# Patient Record
Sex: FEMALE | Race: OTHER | Employment: FULL TIME
[De-identification: names, ages, dates, MRNs, and addresses within clinical notes are randomized per-mention and may not be internally consistent; named-entity substitution may affect disease eponyms.]

---

## 2017-02-14 ENCOUNTER — TELEPHONE (OUTPATIENT)
Facility: CLINIC | Age: 27
End: 2017-02-14

## 2017-02-14 RX ORDER — BENZONATATE 100 MG/1
100 CAPSULE ORAL 3 TIMES DAILY PRN
Qty: 21 CAPSULE | Refills: 0 | Status: SHIPPED | OUTPATIENT
Start: 2017-02-14 | End: 2017-02-21

## 2017-02-14 RX ORDER — ONDANSETRON 4 MG/1
4 TABLET, ORALLY DISINTEGRATING ORAL EVERY 8 HOURS PRN
Qty: 30 TABLET | Refills: 0 | Status: SHIPPED | OUTPATIENT
Start: 2017-02-14 | End: 2018-06-20

## 2017-02-14 RX ORDER — LOPERAMIDE HYDROCHLORIDE 2 MG/1
2 CAPSULE ORAL 4 TIMES DAILY PRN
Qty: 40 CAPSULE | Refills: 0 | Status: SHIPPED | OUTPATIENT
Start: 2017-02-14 | End: 2017-02-24

## 2018-04-15 ENCOUNTER — HOSPITAL ENCOUNTER (EMERGENCY)
Age: 28
Discharge: HOME | End: 2018-04-15
Attending: EMERGENCY MEDICINE

## 2018-04-15 ENCOUNTER — APPOINTMENT (OUTPATIENT)
Dept: CT IMAGING | Age: 28
End: 2018-04-15

## 2018-04-15 VITALS
SYSTOLIC BLOOD PRESSURE: 124 MMHG | DIASTOLIC BLOOD PRESSURE: 43 MMHG | OXYGEN SATURATION: 98 % | WEIGHT: 154 LBS | HEART RATE: 124 BPM | RESPIRATION RATE: 16 BRPM | BODY MASS INDEX: 30.23 KG/M2 | TEMPERATURE: 97.9 F | HEIGHT: 60 IN

## 2018-04-15 DIAGNOSIS — R11.2 NON-INTRACTABLE VOMITING WITH NAUSEA, UNSPECIFIED VOMITING TYPE: Primary | ICD-10-CM

## 2018-04-15 DIAGNOSIS — R19.7 DIARRHEA, UNSPECIFIED TYPE: ICD-10-CM

## 2018-04-15 DIAGNOSIS — R10.10 PAIN OF UPPER ABDOMEN: ICD-10-CM

## 2018-04-15 LAB
-: ABNORMAL
ABSOLUTE BANDS #: 0.73 K/UL (ref 0–1)
ABSOLUTE EOS #: 0 K/UL (ref 0–0.4)
ABSOLUTE IMMATURE GRANULOCYTE: ABNORMAL K/UL (ref 0–0.3)
ABSOLUTE LYMPH #: 0.49 K/UL (ref 1–4.8)
ABSOLUTE MONO #: 1.7 K/UL (ref 0.1–1.3)
ALBUMIN SERPL-MCNC: 4.8 G/DL (ref 3.5–5.2)
ALBUMIN/GLOBULIN RATIO: ABNORMAL (ref 1–2.5)
ALP BLD-CCNC: 78 U/L (ref 35–104)
ALT SERPL-CCNC: 11 U/L (ref 5–33)
AMORPHOUS: ABNORMAL
ANION GAP SERPL CALCULATED.3IONS-SCNC: 16 MMOL/L (ref 9–17)
AST SERPL-CCNC: 16 U/L
BACTERIA: ABNORMAL
BANDS: 3 % (ref 0–10)
BASOPHILS # BLD: 0 % (ref 0–2)
BASOPHILS ABSOLUTE: 0 K/UL (ref 0–0.2)
BILIRUB SERPL-MCNC: 1.2 MG/DL (ref 0.3–1.2)
BILIRUBIN URINE: NEGATIVE
BUN BLDV-MCNC: 12 MG/DL (ref 6–20)
BUN/CREAT BLD: ABNORMAL (ref 9–20)
CALCIUM SERPL-MCNC: 9.1 MG/DL (ref 8.6–10.4)
CASTS UA: ABNORMAL /LPF
CHLORIDE BLD-SCNC: 104 MMOL/L (ref 98–107)
CO2: 22 MMOL/L (ref 20–31)
COLOR: YELLOW
COMMENT UA: ABNORMAL
CREAT SERPL-MCNC: 0.62 MG/DL (ref 0.5–0.9)
CRYSTALS, UA: ABNORMAL /HPF
DIFFERENTIAL TYPE: ABNORMAL
EOSINOPHILS RELATIVE PERCENT: 0 % (ref 0–4)
EPITHELIAL CELLS UA: ABNORMAL /HPF
GFR AFRICAN AMERICAN: >60 ML/MIN
GFR NON-AFRICAN AMERICAN: >60 ML/MIN
GFR SERPL CREATININE-BSD FRML MDRD: ABNORMAL ML/MIN/{1.73_M2}
GFR SERPL CREATININE-BSD FRML MDRD: ABNORMAL ML/MIN/{1.73_M2}
GLUCOSE BLD-MCNC: 110 MG/DL (ref 70–99)
GLUCOSE URINE: NEGATIVE
HCG(URINE) PREGNANCY TEST: NEGATIVE
HCT VFR BLD CALC: 43.2 % (ref 36–46)
HEMOGLOBIN: 14.5 G/DL (ref 12–16)
IMMATURE GRANULOCYTES: ABNORMAL %
KETONES, URINE: ABNORMAL
LEUKOCYTE ESTERASE, URINE: NEGATIVE
LIPASE: 16 U/L (ref 13–60)
LYMPHOCYTES # BLD: 2 % (ref 24–44)
MAGNESIUM: 1.6 MG/DL (ref 1.6–2.6)
MCH RBC QN AUTO: 30.1 PG (ref 26–34)
MCHC RBC AUTO-ENTMCNC: 33.6 G/DL (ref 31–37)
MCV RBC AUTO: 89.6 FL (ref 80–100)
MONOCYTES # BLD: 7 % (ref 1–7)
MORPHOLOGY: ABNORMAL
MUCUS: ABNORMAL
NITRITE, URINE: NEGATIVE
NRBC AUTOMATED: ABNORMAL PER 100 WBC
OTHER OBSERVATIONS UA: ABNORMAL
PDW BLD-RTO: 13.5 % (ref 11.5–14.9)
PH UA: 5.5 (ref 5–8)
PLATELET # BLD: 371 K/UL (ref 150–450)
PLATELET ESTIMATE: ABNORMAL
PMV BLD AUTO: 7.7 FL (ref 6–12)
POTASSIUM SERPL-SCNC: 4.3 MMOL/L (ref 3.7–5.3)
PROTEIN UA: NEGATIVE
RBC # BLD: 4.82 M/UL (ref 4–5.2)
RBC # BLD: ABNORMAL 10*6/UL
RBC UA: ABNORMAL /HPF
RENAL EPITHELIAL, UA: ABNORMAL /HPF
SEG NEUTROPHILS: 88 % (ref 36–66)
SEGMENTED NEUTROPHILS ABSOLUTE COUNT: 21.38 K/UL (ref 1.3–9.1)
SODIUM BLD-SCNC: 142 MMOL/L (ref 135–144)
SPECIFIC GRAVITY UA: 1.03 (ref 1–1.03)
TOTAL PROTEIN: 7.9 G/DL (ref 6.4–8.3)
TRICHOMONAS: ABNORMAL
TURBIDITY: CLEAR
URINE HGB: ABNORMAL
UROBILINOGEN, URINE: NORMAL
WBC # BLD: 24.3 K/UL (ref 3.5–11)
WBC # BLD: ABNORMAL 10*3/UL
WBC UA: ABNORMAL /HPF
YEAST: ABNORMAL

## 2018-04-15 PROCEDURE — 36415 COLL VENOUS BLD VENIPUNCTURE: CPT

## 2018-04-15 PROCEDURE — 2580000003 HC RX 258: Performed by: EMERGENCY MEDICINE

## 2018-04-15 PROCEDURE — 81001 URINALYSIS AUTO W/SCOPE: CPT

## 2018-04-15 PROCEDURE — 74177 CT ABD & PELVIS W/CONTRAST: CPT

## 2018-04-15 PROCEDURE — 80053 COMPREHEN METABOLIC PANEL: CPT

## 2018-04-15 PROCEDURE — 6360000002 HC RX W HCPCS: Performed by: EMERGENCY MEDICINE

## 2018-04-15 PROCEDURE — 87086 URINE CULTURE/COLONY COUNT: CPT

## 2018-04-15 PROCEDURE — 6360000004 HC RX CONTRAST MEDICATION: Performed by: EMERGENCY MEDICINE

## 2018-04-15 PROCEDURE — 99284 EMERGENCY DEPT VISIT MOD MDM: CPT

## 2018-04-15 PROCEDURE — 2500000003 HC RX 250 WO HCPCS: Performed by: EMERGENCY MEDICINE

## 2018-04-15 PROCEDURE — 83735 ASSAY OF MAGNESIUM: CPT

## 2018-04-15 PROCEDURE — S0028 INJECTION, FAMOTIDINE, 20 MG: HCPCS | Performed by: EMERGENCY MEDICINE

## 2018-04-15 PROCEDURE — 84703 CHORIONIC GONADOTROPIN ASSAY: CPT

## 2018-04-15 PROCEDURE — 96375 TX/PRO/DX INJ NEW DRUG ADDON: CPT

## 2018-04-15 PROCEDURE — 83690 ASSAY OF LIPASE: CPT

## 2018-04-15 PROCEDURE — 96374 THER/PROPH/DIAG INJ IV PUSH: CPT

## 2018-04-15 PROCEDURE — 85025 COMPLETE CBC W/AUTO DIFF WBC: CPT

## 2018-04-15 RX ORDER — 0.9 % SODIUM CHLORIDE 0.9 %
100 INTRAVENOUS SOLUTION INTRAVENOUS ONCE
Status: COMPLETED | OUTPATIENT
Start: 2018-04-15 | End: 2018-04-15

## 2018-04-15 RX ORDER — DICYCLOMINE HYDROCHLORIDE 10 MG/1
10 CAPSULE ORAL
Qty: 20 CAPSULE | Refills: 0 | Status: SHIPPED | OUTPATIENT
Start: 2018-04-15 | End: 2018-06-20

## 2018-04-15 RX ORDER — DICYCLOMINE HYDROCHLORIDE 10 MG/ML
20 INJECTION INTRAMUSCULAR ONCE
Status: COMPLETED | OUTPATIENT
Start: 2018-04-15 | End: 2018-04-15

## 2018-04-15 RX ORDER — FAMOTIDINE 20 MG/1
20 TABLET, FILM COATED ORAL 2 TIMES DAILY
Qty: 20 TABLET | Refills: 0 | Status: SHIPPED | OUTPATIENT
Start: 2018-04-15 | End: 2018-06-20

## 2018-04-15 RX ORDER — SODIUM CHLORIDE 0.9 % (FLUSH) 0.9 %
10 SYRINGE (ML) INJECTION PRN
Status: DISCONTINUED | OUTPATIENT
Start: 2018-04-15 | End: 2018-04-15 | Stop reason: HOSPADM

## 2018-04-15 RX ORDER — ONDANSETRON 2 MG/ML
8 INJECTION INTRAMUSCULAR; INTRAVENOUS ONCE
Status: COMPLETED | OUTPATIENT
Start: 2018-04-15 | End: 2018-04-15

## 2018-04-15 RX ORDER — 0.9 % SODIUM CHLORIDE 0.9 %
1000 INTRAVENOUS SOLUTION INTRAVENOUS ONCE
Status: COMPLETED | OUTPATIENT
Start: 2018-04-15 | End: 2018-04-15

## 2018-04-15 RX ORDER — ONDANSETRON 4 MG/1
4 TABLET, FILM COATED ORAL EVERY 8 HOURS PRN
Qty: 20 TABLET | Refills: 0 | Status: SHIPPED | OUTPATIENT
Start: 2018-04-15 | End: 2018-06-20

## 2018-04-15 RX ADMIN — SODIUM CHLORIDE 1000 ML: 9 INJECTION, SOLUTION INTRAVENOUS at 15:40

## 2018-04-15 RX ADMIN — ONDANSETRON 8 MG: 2 INJECTION INTRAMUSCULAR; INTRAVENOUS at 15:43

## 2018-04-15 RX ADMIN — IOPAMIDOL 75 ML: 755 INJECTION, SOLUTION INTRAVENOUS at 16:19

## 2018-04-15 RX ADMIN — DICYCLOMINE HYDROCHLORIDE 20 MG: 20 INJECTION, SOLUTION INTRAMUSCULAR at 15:34

## 2018-04-15 RX ADMIN — SODIUM CHLORIDE 100 ML: 9 INJECTION, SOLUTION INTRAVENOUS at 16:19

## 2018-04-15 RX ADMIN — FAMOTIDINE 20 MG: 10 INJECTION INTRAVENOUS at 15:46

## 2018-04-15 RX ADMIN — PROCHLORPERAZINE EDISYLATE 10 MG: 5 INJECTION INTRAMUSCULAR; INTRAVENOUS at 16:53

## 2018-04-15 RX ADMIN — Medication 10 ML: at 16:20

## 2018-04-15 ASSESSMENT — ENCOUNTER SYMPTOMS
ABDOMINAL PAIN: 1
NAUSEA: 1
VOMITING: 1
DIARRHEA: 1

## 2018-04-15 ASSESSMENT — PAIN SCALES - GENERAL: PAINLEVEL_OUTOF10: 10

## 2018-04-16 LAB
CULTURE: NORMAL
CULTURE: NORMAL
Lab: NORMAL
SPECIMEN DESCRIPTION: NORMAL
SPECIMEN DESCRIPTION: NORMAL
STATUS: NORMAL

## 2018-06-20 ENCOUNTER — OFFICE VISIT (OUTPATIENT)
Dept: OBGYN CLINIC | Age: 28
End: 2018-06-20
Payer: COMMERCIAL

## 2018-06-20 ENCOUNTER — HOSPITAL ENCOUNTER (OUTPATIENT)
Age: 28
Setting detail: SPECIMEN
Discharge: HOME | End: 2018-06-20
Payer: COMMERCIAL

## 2018-06-20 VITALS
WEIGHT: 129 LBS | BODY MASS INDEX: 22.86 KG/M2 | SYSTOLIC BLOOD PRESSURE: 110 MMHG | DIASTOLIC BLOOD PRESSURE: 72 MMHG | HEART RATE: 80 BPM | HEIGHT: 63 IN

## 2018-06-20 DIAGNOSIS — Z11.3 SCREENING EXAMINATION FOR STD (SEXUALLY TRANSMITTED DISEASE): ICD-10-CM

## 2018-06-20 DIAGNOSIS — Z97.5 NEXPLANON IN PLACE: ICD-10-CM

## 2018-06-20 DIAGNOSIS — Z01.419 WELL WOMAN EXAM: ICD-10-CM

## 2018-06-20 DIAGNOSIS — Z01.419 WELL WOMAN EXAM: Primary | ICD-10-CM

## 2018-06-20 LAB
DIRECT EXAM: ABNORMAL
Lab: ABNORMAL
SPECIMEN DESCRIPTION: ABNORMAL
STATUS: ABNORMAL

## 2018-06-20 PROCEDURE — 87480 CANDIDA DNA DIR PROBE: CPT

## 2018-06-20 PROCEDURE — 87510 GARDNER VAG DNA DIR PROBE: CPT

## 2018-06-20 PROCEDURE — 87591 N.GONORRHOEAE DNA AMP PROB: CPT

## 2018-06-20 PROCEDURE — 87660 TRICHOMONAS VAGIN DIR PROBE: CPT

## 2018-06-20 PROCEDURE — 87491 CHLMYD TRACH DNA AMP PROBE: CPT

## 2018-06-20 PROCEDURE — 99214 OFFICE O/P EST MOD 30 MIN: CPT | Performed by: NURSE PRACTITIONER

## 2018-06-20 PROCEDURE — G0145 SCR C/V CYTO,THINLAYER,RESCR: HCPCS

## 2018-06-20 ASSESSMENT — PATIENT HEALTH QUESTIONNAIRE - PHQ9
SUM OF ALL RESPONSES TO PHQ9 QUESTIONS 1 & 2: 0
SUM OF ALL RESPONSES TO PHQ QUESTIONS 1-9: 0
1. LITTLE INTEREST OR PLEASURE IN DOING THINGS: 0
2. FEELING DOWN, DEPRESSED OR HOPELESS: 0

## 2018-06-21 ENCOUNTER — TELEPHONE (OUTPATIENT)
Dept: OBGYN CLINIC | Age: 28
End: 2018-06-21

## 2018-06-21 LAB
C TRACH DNA GENITAL QL NAA+PROBE: NEGATIVE
N. GONORRHOEAE DNA: NEGATIVE

## 2018-06-21 RX ORDER — METRONIDAZOLE 500 MG/1
500 TABLET ORAL 2 TIMES DAILY
Qty: 14 TABLET | Refills: 0 | Status: SHIPPED | OUTPATIENT
Start: 2018-06-21 | End: 2018-06-28

## 2018-07-09 LAB — CYTOLOGY REPORT: NORMAL

## 2018-07-26 ENCOUNTER — OFFICE VISIT (OUTPATIENT)
Dept: OBGYN CLINIC | Age: 28
End: 2018-07-26
Payer: COMMERCIAL

## 2018-07-26 ENCOUNTER — HOSPITAL ENCOUNTER (OUTPATIENT)
Age: 28
Setting detail: SPECIMEN
Discharge: HOME | End: 2018-07-26
Payer: COMMERCIAL

## 2018-07-26 VITALS
DIASTOLIC BLOOD PRESSURE: 76 MMHG | HEIGHT: 62 IN | HEART RATE: 76 BPM | WEIGHT: 132 LBS | BODY MASS INDEX: 24.29 KG/M2 | SYSTOLIC BLOOD PRESSURE: 104 MMHG

## 2018-07-26 DIAGNOSIS — Z97.5 NEXPLANON IN PLACE: Primary | ICD-10-CM

## 2018-07-26 DIAGNOSIS — Z30.09 FAMILY PLANNING: ICD-10-CM

## 2018-07-26 PROCEDURE — 11976 REMOVE CONTRACEPTIVE CAPSULE: CPT | Performed by: OBSTETRICS & GYNECOLOGY

## 2018-07-26 PROCEDURE — 88300 SURGICAL PATH GROSS: CPT

## 2018-07-26 RX ORDER — PNV NO.95/FERROUS FUM/FOLIC AC 28MG-0.8MG
1 TABLET ORAL DAILY
Qty: 30 TABLET | Refills: 12 | Status: SHIPPED | OUTPATIENT
Start: 2018-07-26 | End: 2018-10-10

## 2018-07-30 LAB — SURGICAL PATHOLOGY REPORT: NORMAL

## 2018-10-10 ENCOUNTER — HOSPITAL ENCOUNTER (OUTPATIENT)
Age: 28
Discharge: HOME | End: 2018-10-10
Payer: COMMERCIAL

## 2018-10-10 ENCOUNTER — OFFICE VISIT (OUTPATIENT)
Dept: OBGYN CLINIC | Age: 28
End: 2018-10-10
Payer: COMMERCIAL

## 2018-10-10 VITALS
RESPIRATION RATE: 18 BRPM | SYSTOLIC BLOOD PRESSURE: 114 MMHG | DIASTOLIC BLOOD PRESSURE: 70 MMHG | BODY MASS INDEX: 25.91 KG/M2 | HEIGHT: 60 IN | HEART RATE: 74 BPM | WEIGHT: 132 LBS

## 2018-10-10 DIAGNOSIS — N63.21 BREAST LUMP ON LEFT SIDE AT 2 O'CLOCK POSITION: ICD-10-CM

## 2018-10-10 DIAGNOSIS — N64.4 BREAST PAIN: ICD-10-CM

## 2018-10-10 DIAGNOSIS — N63.11 BREAST LUMP ON RIGHT SIDE AT 11 O'CLOCK POSITION: Primary | ICD-10-CM

## 2018-10-10 DIAGNOSIS — N63.25 BREAST LUMP ON LEFT SIDE AT 12 O'CLOCK POSITION: ICD-10-CM

## 2018-10-10 LAB — HCG QUANTITATIVE: <1 IU/L

## 2018-10-10 PROCEDURE — 36415 COLL VENOUS BLD VENIPUNCTURE: CPT

## 2018-10-10 PROCEDURE — G8419 CALC BMI OUT NRM PARAM NOF/U: HCPCS | Performed by: NURSE PRACTITIONER

## 2018-10-10 PROCEDURE — G8427 DOCREV CUR MEDS BY ELIG CLIN: HCPCS | Performed by: NURSE PRACTITIONER

## 2018-10-10 PROCEDURE — G8484 FLU IMMUNIZE NO ADMIN: HCPCS | Performed by: NURSE PRACTITIONER

## 2018-10-10 PROCEDURE — 84702 CHORIONIC GONADOTROPIN TEST: CPT

## 2018-10-10 PROCEDURE — 1036F TOBACCO NON-USER: CPT | Performed by: NURSE PRACTITIONER

## 2018-10-10 PROCEDURE — 99213 OFFICE O/P EST LOW 20 MIN: CPT | Performed by: NURSE PRACTITIONER

## 2018-10-10 NOTE — PROGRESS NOTES
Aurelio Lou  10/10/2018    YOB: 1990          The patient was seen today. She is here regarding bilateral breast pain and breast lumps. Denies family hx of breast cancer. Patient reports pain, tenderness and lumps have been happening for past 2 weeks. Currently trying to get pregnant. LMP 2018. Had nexplanon removed 2018. Did not have menses while nexplanon in place. Negative home urine pregnancy test.  Her bowels are regular and she is voiding without difficulty. HPI:  Aurelio Lou is a 24 y.o. female      Breast pain and breast lumps      Obstetric History       T1      L1     SAB0   TAB0   Ectopic0   Molar0   Multiple0   Live Births0       # Outcome Date GA Lbr Kevin/2nd Weight Sex Delivery Anes PTL Lv   1 Term  38w1d  6 lb 14.8 oz (3.14 kg)  Vag-Spont         Apgar1:  8                Apgar5: 9          Past Medical History:   Diagnosis Date    Depression        History reviewed. No pertinent surgical history. Family History   Problem Relation Age of Onset    Hypertension Other         G.Parents    Depression Mother     Hypertension Mother     Alcohol Abuse Mother     Diabetes Other         G.Parents       Social History     Social History    Marital status: Single     Spouse name: N/A    Number of children: N/A    Years of education: N/A     Occupational History    Not on file. Social History Main Topics    Smoking status: Never Smoker    Smokeless tobacco: Never Used    Alcohol use 0.0 oz/week      Comment: occ    Drug use: Yes     Types: Marijuana      Comment: occ    Sexual activity: Yes     Partners: Male     Other Topics Concern    Not on file     Social History Narrative    No narrative on file         MEDICATIONS:  No current outpatient prescriptions on file. No current facility-administered medications for this visit.               ALLERGIES:  Allergies as of 10/10/2018    (No Known Allergies)         REVIEW OF

## 2018-12-11 DIAGNOSIS — N92.6 MISSED MENSES: Primary | ICD-10-CM

## 2018-12-12 ENCOUNTER — HOSPITAL ENCOUNTER (OUTPATIENT)
Age: 28
Discharge: HOME | End: 2018-12-12
Payer: COMMERCIAL

## 2018-12-12 DIAGNOSIS — N92.6 MISSED MENSES: ICD-10-CM

## 2018-12-12 LAB — HCG QUANTITATIVE: 5082 IU/L

## 2018-12-12 PROCEDURE — 36415 COLL VENOUS BLD VENIPUNCTURE: CPT

## 2018-12-12 PROCEDURE — 84702 CHORIONIC GONADOTROPIN TEST: CPT

## 2018-12-13 ENCOUNTER — TELEPHONE (OUTPATIENT)
Dept: OBGYN CLINIC | Age: 28
End: 2018-12-13

## 2018-12-13 RX ORDER — PNV NO.95/FERROUS FUM/FOLIC AC 28MG-0.8MG
1 TABLET ORAL DAILY
Qty: 30 TABLET | Refills: 11 | Status: SHIPPED | OUTPATIENT
Start: 2018-12-13 | End: 2019-11-18

## 2018-12-20 ENCOUNTER — TELEPHONE (OUTPATIENT)
Dept: OBGYN CLINIC | Age: 28
End: 2018-12-20

## 2018-12-20 RX ORDER — PYRIDOXINE HCL (VITAMIN B6) 50 MG
50 TABLET ORAL 2 TIMES DAILY
Qty: 60 TABLET | Refills: 0 | Status: SHIPPED | OUTPATIENT
Start: 2018-12-20 | End: 2019-11-18

## 2018-12-20 NOTE — TELEPHONE ENCOUNTER
Patient called ( is about 6 weeks pregnant/ has initial OB intake 1/15/19) She states she is very nauseous and having difficulty being able to keep food and drink down without vomiting. *She is requesting rx for something to be sent to [address] [place] [address] on  [employer].

## 2018-12-20 NOTE — TELEPHONE ENCOUNTER
Per Benigno Elias rx sent to pharmacy.   Left message for patient to call if any further questions or concerns

## 2018-12-27 ENCOUNTER — TELEPHONE (OUTPATIENT)
Dept: OBGYN CLINIC | Age: 28
End: 2018-12-27

## 2018-12-27 NOTE — TELEPHONE ENCOUNTER
Pt called stating that she is about 6 weeks pregnant and she has been vomiting for 2 days and has not been able to keep any liquid or food down. Pt stated that she has been vomiting blood in a decent amount. Per Anastasia Members, pt to go to the ER to get hydrated and evaluated.

## 2019-01-15 ENCOUNTER — HOSPITAL ENCOUNTER (OUTPATIENT)
Age: 29
Setting detail: SPECIMEN
Discharge: HOME | End: 2019-01-15
Payer: COMMERCIAL

## 2019-01-15 ENCOUNTER — INITIAL PRENATAL (OUTPATIENT)
Dept: OBGYN CLINIC | Age: 29
End: 2019-01-15
Payer: COMMERCIAL

## 2019-01-15 VITALS
WEIGHT: 128 LBS | BODY MASS INDEX: 25 KG/M2 | DIASTOLIC BLOOD PRESSURE: 70 MMHG | HEART RATE: 76 BPM | SYSTOLIC BLOOD PRESSURE: 116 MMHG

## 2019-01-15 DIAGNOSIS — Z3A.09 9 WEEKS GESTATION OF PREGNANCY: ICD-10-CM

## 2019-01-15 DIAGNOSIS — Z34.90 SECOND PREGNANCY: Primary | ICD-10-CM

## 2019-01-15 DIAGNOSIS — Z34.90 SECOND PREGNANCY: ICD-10-CM

## 2019-01-15 PROCEDURE — 99213 OFFICE O/P EST LOW 20 MIN: CPT | Performed by: ADVANCED PRACTICE MIDWIFE

## 2019-01-15 PROCEDURE — 87070 CULTURE OTHR SPECIMN AEROBIC: CPT

## 2019-01-15 PROCEDURE — H1000 PRENATAL CARE ATRISK ASSESSM: HCPCS | Performed by: ADVANCED PRACTICE MIDWIFE

## 2019-01-15 PROCEDURE — 87491 CHLMYD TRACH DNA AMP PROBE: CPT

## 2019-01-15 PROCEDURE — 87591 N.GONORRHOEAE DNA AMP PROB: CPT

## 2019-01-16 ENCOUNTER — HOSPITAL ENCOUNTER (OUTPATIENT)
Age: 29
Discharge: HOME | End: 2019-01-16
Payer: COMMERCIAL

## 2019-01-16 DIAGNOSIS — Z3A.09 9 WEEKS GESTATION OF PREGNANCY: ICD-10-CM

## 2019-01-16 DIAGNOSIS — Z34.90 SECOND PREGNANCY: ICD-10-CM

## 2019-01-16 PROBLEM — E03.8 TSH DEFICIENCY: Status: ACTIVE | Noted: 2019-01-16

## 2019-01-16 LAB
-: ABNORMAL
ABO/RH: NORMAL
ABSOLUTE EOS #: 0 K/UL (ref 0–0.4)
ABSOLUTE IMMATURE GRANULOCYTE: ABNORMAL K/UL (ref 0–0.3)
ABSOLUTE LYMPH #: 1.8 K/UL (ref 1–4.8)
ABSOLUTE MONO #: 0.8 K/UL (ref 0.1–1.3)
AMORPHOUS: ABNORMAL
ANTIBODY SCREEN: NEGATIVE
BACTERIA: ABNORMAL
BASOPHILS # BLD: 0 % (ref 0–2)
BASOPHILS ABSOLUTE: 0 K/UL (ref 0–0.2)
BILIRUBIN URINE: NEGATIVE
C TRACH DNA GENITAL QL NAA+PROBE: NEGATIVE
CASTS UA: ABNORMAL /LPF
COLOR: YELLOW
COMMENT UA: ABNORMAL
CRYSTALS, UA: ABNORMAL /HPF
DIFFERENTIAL TYPE: ABNORMAL
EOSINOPHILS RELATIVE PERCENT: 0 % (ref 0–4)
EPITHELIAL CELLS UA: ABNORMAL /HPF
GLUCOSE BLD-MCNC: 75 MG/DL (ref 70–99)
GLUCOSE URINE: ABNORMAL
HCG QUANTITATIVE: ABNORMAL IU/L
HCT VFR BLD CALC: 39.8 % (ref 36–46)
HEMOGLOBIN: 13.5 G/DL (ref 12–16)
HEPATITIS B SURFACE ANTIGEN: NONREACTIVE
HIV AG/AB: NONREACTIVE
IMMATURE GRANULOCYTES: ABNORMAL %
KETONES, URINE: ABNORMAL
LEUKOCYTE ESTERASE, URINE: NEGATIVE
LYMPHOCYTES # BLD: 12 % (ref 24–44)
MCH RBC QN AUTO: 30.8 PG (ref 26–34)
MCHC RBC AUTO-ENTMCNC: 33.9 G/DL (ref 31–37)
MCV RBC AUTO: 90.9 FL (ref 80–100)
MONOCYTES # BLD: 6 % (ref 1–7)
MUCUS: ABNORMAL
N. GONORRHOEAE DNA: NEGATIVE
NITRITE, URINE: NEGATIVE
NRBC AUTOMATED: ABNORMAL PER 100 WBC
OTHER OBSERVATIONS UA: ABNORMAL
PDW BLD-RTO: 13.5 % (ref 11.5–14.9)
PH UA: 5 (ref 5–8)
PLATELET # BLD: 418 K/UL (ref 150–450)
PLATELET ESTIMATE: ABNORMAL
PMV BLD AUTO: 7.4 FL (ref 6–12)
PROTEIN UA: ABNORMAL
RBC # BLD: 4.37 M/UL (ref 4–5.2)
RBC # BLD: ABNORMAL 10*6/UL
RBC UA: ABNORMAL /HPF
RENAL EPITHELIAL, UA: ABNORMAL /HPF
RUBV IGG SER QL: 60.2 IU/ML
SEG NEUTROPHILS: 82 % (ref 36–66)
SEGMENTED NEUTROPHILS ABSOLUTE COUNT: 11.7 K/UL (ref 1.3–9.1)
SPECIFIC GRAVITY UA: 1.03 (ref 1–1.03)
T. PALLIDUM, IGG: NONREACTIVE
THYROXINE, FREE: 1.29 NG/DL (ref 0.93–1.7)
TRICHOMONAS: ABNORMAL
TSH SERPL DL<=0.05 MIU/L-ACNC: 0.21 MIU/L (ref 0.3–5)
TURBIDITY: ABNORMAL
URINE HGB: ABNORMAL
UROBILINOGEN, URINE: NORMAL
WBC # BLD: 14.3 K/UL (ref 3.5–11)
WBC # BLD: ABNORMAL 10*3/UL
WBC UA: ABNORMAL /HPF
YEAST: ABNORMAL

## 2019-01-16 PROCEDURE — 87389 HIV-1 AG W/HIV-1&-2 AB AG IA: CPT

## 2019-01-16 PROCEDURE — 86850 RBC ANTIBODY SCREEN: CPT

## 2019-01-16 PROCEDURE — 36415 COLL VENOUS BLD VENIPUNCTURE: CPT

## 2019-01-16 PROCEDURE — 81001 URINALYSIS AUTO W/SCOPE: CPT

## 2019-01-16 PROCEDURE — 86901 BLOOD TYPING SEROLOGIC RH(D): CPT

## 2019-01-16 PROCEDURE — 85025 COMPLETE CBC W/AUTO DIFF WBC: CPT

## 2019-01-16 PROCEDURE — 84702 CHORIONIC GONADOTROPIN TEST: CPT

## 2019-01-16 PROCEDURE — 86762 RUBELLA ANTIBODY: CPT

## 2019-01-16 PROCEDURE — 87340 HEPATITIS B SURFACE AG IA: CPT

## 2019-01-16 PROCEDURE — 86780 TREPONEMA PALLIDUM: CPT

## 2019-01-16 PROCEDURE — 84443 ASSAY THYROID STIM HORMONE: CPT

## 2019-01-16 PROCEDURE — 84439 ASSAY OF FREE THYROXINE: CPT

## 2019-01-16 PROCEDURE — 82947 ASSAY GLUCOSE BLOOD QUANT: CPT

## 2019-01-16 PROCEDURE — 86900 BLOOD TYPING SEROLOGIC ABO: CPT

## 2019-01-18 LAB
CULTURE: ABNORMAL
Lab: ABNORMAL
SPECIMEN DESCRIPTION: ABNORMAL
STATUS: ABNORMAL

## 2019-02-05 ENCOUNTER — TELEPHONE (OUTPATIENT)
Dept: OBGYN CLINIC | Age: 29
End: 2019-02-05

## 2019-02-05 RX ORDER — FLUCONAZOLE 150 MG/1
150 TABLET ORAL ONCE
Qty: 1 TABLET | Refills: 1 | Status: CANCELLED | OUTPATIENT
Start: 2019-02-05 | End: 2019-02-05

## 2019-02-13 ENCOUNTER — HOSPITAL ENCOUNTER (OUTPATIENT)
Age: 29
Setting detail: SPECIMEN
Discharge: HOME | End: 2019-02-13
Payer: COMMERCIAL

## 2019-02-13 ENCOUNTER — HOSPITAL ENCOUNTER (OUTPATIENT)
Age: 29
Setting detail: SPECIMEN
End: 2019-02-13
Payer: COMMERCIAL

## 2019-02-13 ENCOUNTER — ROUTINE PRENATAL (OUTPATIENT)
Dept: OBGYN CLINIC | Age: 29
End: 2019-02-13
Payer: COMMERCIAL

## 2019-02-13 VITALS
SYSTOLIC BLOOD PRESSURE: 94 MMHG | DIASTOLIC BLOOD PRESSURE: 62 MMHG | WEIGHT: 128 LBS | HEART RATE: 78 BPM | BODY MASS INDEX: 25 KG/M2

## 2019-02-13 DIAGNOSIS — R82.71 BACTERIA IN URINE: ICD-10-CM

## 2019-02-13 DIAGNOSIS — O09.91 SUPERVISION OF HIGH RISK PREGNANCY IN FIRST TRIMESTER: Primary | ICD-10-CM

## 2019-02-13 DIAGNOSIS — Z34.90 SECOND PREGNANCY: ICD-10-CM

## 2019-02-13 DIAGNOSIS — E03.8 TSH DEFICIENCY: ICD-10-CM

## 2019-02-13 DIAGNOSIS — R82.71 BACTERIA IN URINE: Primary | ICD-10-CM

## 2019-02-13 LAB
-: ABNORMAL
AMORPHOUS: ABNORMAL
BACTERIA: ABNORMAL
BILIRUBIN URINE: NEGATIVE
CASTS UA: ABNORMAL /LPF
COLOR: YELLOW
COMMENT UA: ABNORMAL
CRYSTALS, UA: ABNORMAL /HPF
CRYSTALS, UA: ABNORMAL /HPF
EPITHELIAL CELLS UA: ABNORMAL /HPF
GLUCOSE URINE: NEGATIVE
KETONES, URINE: NEGATIVE
LEUKOCYTE ESTERASE, URINE: NEGATIVE
MUCUS: ABNORMAL
NITRITE, URINE: NEGATIVE
OTHER OBSERVATIONS UA: ABNORMAL
PH UA: 5.5 (ref 5–8)
PROTEIN UA: NEGATIVE
RBC UA: ABNORMAL /HPF
RENAL EPITHELIAL, UA: ABNORMAL /HPF
SPECIFIC GRAVITY UA: 1.03 (ref 1–1.03)
TRICHOMONAS: ABNORMAL
TURBIDITY: ABNORMAL
URINE HGB: NEGATIVE
UROBILINOGEN, URINE: NORMAL
WBC UA: ABNORMAL /HPF
YEAST: ABNORMAL

## 2019-02-13 PROCEDURE — 87086 URINE CULTURE/COLONY COUNT: CPT

## 2019-02-13 PROCEDURE — 1036F TOBACCO NON-USER: CPT | Performed by: NURSE PRACTITIONER

## 2019-02-13 PROCEDURE — G8427 DOCREV CUR MEDS BY ELIG CLIN: HCPCS | Performed by: NURSE PRACTITIONER

## 2019-02-13 PROCEDURE — G8484 FLU IMMUNIZE NO ADMIN: HCPCS | Performed by: NURSE PRACTITIONER

## 2019-02-13 PROCEDURE — 99213 OFFICE O/P EST LOW 20 MIN: CPT | Performed by: NURSE PRACTITIONER

## 2019-02-13 PROCEDURE — 81001 URINALYSIS AUTO W/SCOPE: CPT

## 2019-02-13 PROCEDURE — G8419 CALC BMI OUT NRM PARAM NOF/U: HCPCS | Performed by: NURSE PRACTITIONER

## 2019-02-13 RX ORDER — LANOLIN ALCOHOL/MO/W.PET/CERES
50 CREAM (GRAM) TOPICAL 2 TIMES DAILY
Qty: 60 TABLET | Refills: 3 | Status: SHIPPED | OUTPATIENT
Start: 2019-02-13 | End: 2019-03-12

## 2019-02-14 DIAGNOSIS — O09.92 SUPERVISION OF HIGH-RISK PREGNANCY, SECOND TRIMESTER: Primary | ICD-10-CM

## 2019-02-14 LAB
CULTURE: NO GROWTH
Lab: NORMAL
SPECIMEN DESCRIPTION: NORMAL

## 2019-02-18 ENCOUNTER — OFFICE VISIT (OUTPATIENT)
Dept: OBGYN CLINIC | Age: 29
End: 2019-02-18

## 2019-02-18 DIAGNOSIS — O09.92 SUPERVISION OF HIGH-RISK PREGNANCY, SECOND TRIMESTER: Primary | ICD-10-CM

## 2019-02-18 DIAGNOSIS — O09.92 SUPERVISION OF HIGH-RISK PREGNANCY, SECOND TRIMESTER: ICD-10-CM

## 2019-02-18 LAB
ABDOMINAL CIRCUMFERENCE: NORMAL CM
BIPARIETAL DIAMETER: NORMAL CM
ESTIMATED FETAL WEIGHT: NORMAL GRAMS
FEMORAL DIAMETER: NORMAL CM
HC/AC: NORMAL
HEAD CIRCUMFERENCE: NORMAL CM

## 2019-02-18 PROCEDURE — 76816 OB US FOLLOW-UP PER FETUS: CPT | Performed by: OBSTETRICS & GYNECOLOGY

## 2019-02-21 ENCOUNTER — TELEPHONE (OUTPATIENT)
Dept: OBGYN CLINIC | Age: 29
End: 2019-02-21

## 2019-03-12 ENCOUNTER — ROUTINE PRENATAL (OUTPATIENT)
Dept: OBGYN CLINIC | Age: 29
End: 2019-03-12
Payer: COMMERCIAL

## 2019-03-12 VITALS
WEIGHT: 137 LBS | HEART RATE: 72 BPM | DIASTOLIC BLOOD PRESSURE: 68 MMHG | SYSTOLIC BLOOD PRESSURE: 114 MMHG | BODY MASS INDEX: 26.76 KG/M2

## 2019-03-12 DIAGNOSIS — E03.8 TSH DEFICIENCY: ICD-10-CM

## 2019-03-12 DIAGNOSIS — Z34.90 SECOND PREGNANCY: ICD-10-CM

## 2019-03-12 DIAGNOSIS — Z3A.18 18 WEEKS GESTATION OF PREGNANCY: Primary | ICD-10-CM

## 2019-03-12 PROCEDURE — G8427 DOCREV CUR MEDS BY ELIG CLIN: HCPCS | Performed by: ADVANCED PRACTICE MIDWIFE

## 2019-03-12 PROCEDURE — 99213 OFFICE O/P EST LOW 20 MIN: CPT | Performed by: ADVANCED PRACTICE MIDWIFE

## 2019-03-12 PROCEDURE — G8419 CALC BMI OUT NRM PARAM NOF/U: HCPCS | Performed by: ADVANCED PRACTICE MIDWIFE

## 2019-03-12 PROCEDURE — 1036F TOBACCO NON-USER: CPT | Performed by: ADVANCED PRACTICE MIDWIFE

## 2019-03-12 PROCEDURE — G8484 FLU IMMUNIZE NO ADMIN: HCPCS | Performed by: ADVANCED PRACTICE MIDWIFE

## 2019-04-04 ENCOUNTER — ROUTINE PRENATAL (OUTPATIENT)
Dept: PERINATAL CARE | Age: 29
End: 2019-04-04
Payer: COMMERCIAL

## 2019-04-04 VITALS
RESPIRATION RATE: 16 BRPM | WEIGHT: 147 LBS | SYSTOLIC BLOOD PRESSURE: 101 MMHG | BODY MASS INDEX: 28.86 KG/M2 | DIASTOLIC BLOOD PRESSURE: 61 MMHG | HEART RATE: 75 BPM | TEMPERATURE: 97.8 F | HEIGHT: 60 IN

## 2019-04-04 DIAGNOSIS — Z36.86 ENCOUNTER FOR SCREENING FOR RISK OF PRE-TERM LABOR: ICD-10-CM

## 2019-04-04 DIAGNOSIS — Z13.89 ENCOUNTER FOR ROUTINE SCREENING FOR MALFORMATION USING ULTRASONICS: Primary | ICD-10-CM

## 2019-04-04 DIAGNOSIS — Z3A.21 21 WEEKS GESTATION OF PREGNANCY: ICD-10-CM

## 2019-04-04 PROCEDURE — 76805 OB US >/= 14 WKS SNGL FETUS: CPT | Performed by: OBSTETRICS & GYNECOLOGY

## 2019-04-04 PROCEDURE — 76817 TRANSVAGINAL US OBSTETRIC: CPT | Performed by: OBSTETRICS & GYNECOLOGY

## 2019-04-08 ENCOUNTER — HOSPITAL ENCOUNTER (OUTPATIENT)
Age: 29
Setting detail: SPECIMEN
Discharge: HOME | End: 2019-04-08
Payer: COMMERCIAL

## 2019-04-08 DIAGNOSIS — Z34.90 SECOND PREGNANCY: ICD-10-CM

## 2019-04-08 DIAGNOSIS — Z3A.18 18 WEEKS GESTATION OF PREGNANCY: ICD-10-CM

## 2019-04-08 PROCEDURE — 36415 COLL VENOUS BLD VENIPUNCTURE: CPT

## 2019-04-08 PROCEDURE — 81511 FTL CGEN ABNOR FOUR ANAL: CPT

## 2019-04-11 ENCOUNTER — ROUTINE PRENATAL (OUTPATIENT)
Dept: OBGYN CLINIC | Age: 29
End: 2019-04-11
Payer: COMMERCIAL

## 2019-04-11 VITALS
DIASTOLIC BLOOD PRESSURE: 68 MMHG | SYSTOLIC BLOOD PRESSURE: 108 MMHG | BODY MASS INDEX: 28.9 KG/M2 | WEIGHT: 148 LBS | HEART RATE: 74 BPM

## 2019-04-11 DIAGNOSIS — E03.8 TSH DEFICIENCY: ICD-10-CM

## 2019-04-11 DIAGNOSIS — Z3A.22 22 WEEKS GESTATION OF PREGNANCY: Primary | ICD-10-CM

## 2019-04-11 DIAGNOSIS — Z34.90 SECOND PREGNANCY: ICD-10-CM

## 2019-04-11 LAB
AFP INTERPRETATION: NORMAL
AFP MOM: 0.86
AFP QUAD INTERPRETATION: NORMAL
AFP SPECIMEN: NORMAL
AFP: 73 NG/ML
DATE OF BIRTH: NORMAL
DATING METHOD: NORMAL
DETERMINED BY: NORMAL
DIABETIC: NO
DIMERIC INHIBIN A: 387 PG/ML
DUE DATE: NORMAL
ESTIMATED DUE DATE: NORMAL
FAMILY HISTORY NTD: NO
GESTATIONAL AGE: NORMAL
HISTORY OF ANEUPLOIDY?: NO
IN VITRO FERTILIZATION: NO
INHIBIN A MOM: 1.64
INSULIN REQ DIABETES: NO
LAST MENSTRUAL PERIOD: NORMAL
MATERNAL AGE AT EDD: 26.7 YR
MATERNAL WEIGHT: 147
MOM FOR HCG, 2ND TRIMESTER: 0.92
MONOCHORIONIC TWINS: NO
NUMBER OF FETUSES: NORMAL
PATIENT WEIGHT UNITS: NORMAL
PATIENT WEIGHT: NORMAL
PATIENT'S HCG, TRI 2: NORMAL IU/L
PREV TRISOMY PREG: NO
RACE (MATERNAL): NORMAL
RACE: NORMAL
REPEAT SPECIMEN?: NO
SMOKING: NO
SMOKING: NO
UE3 MOM: 0.73
UE3 VALUE: 2.22 NG/ML
VALPROIC/CARBAMAZEP: NO
ZZ NTE CLEAN UP: HISTORY: NO

## 2019-04-11 PROCEDURE — G8427 DOCREV CUR MEDS BY ELIG CLIN: HCPCS | Performed by: NURSE PRACTITIONER

## 2019-04-11 PROCEDURE — G8419 CALC BMI OUT NRM PARAM NOF/U: HCPCS | Performed by: NURSE PRACTITIONER

## 2019-04-11 PROCEDURE — 1036F TOBACCO NON-USER: CPT | Performed by: NURSE PRACTITIONER

## 2019-04-11 PROCEDURE — 99213 OFFICE O/P EST LOW 20 MIN: CPT | Performed by: NURSE PRACTITIONER

## 2019-04-11 NOTE — PROGRESS NOTES
Lisa Devries is a 34 y.o. female 23w7d        OB History    Para Term  AB Living   2 1 1 0 0 1   SAB TAB Ectopic Molar Multiple Live Births           0 1      # Outcome Date GA Lbr Kevin/2nd Weight Sex Delivery Anes PTL Lv   2 Current            1 Term 2014 38w1d  6 lb 14 oz (3.118 kg) F Vag-Spont   JOAN       Vitals  BP: 108/68  Weight: 148 lb (67.1 kg)  Pulse: 74  Patient Position: Sitting  Albumin: Negative  Glucose: Negative    The patient was seen and evaluated. There was positive fetal movements. No contractions or leakage of fluid. Signs and symptoms of  labor as well as labor were reviewed. The Nuchal Translucency testing was reviewed and found to be not completed. A maternal QUAD screen was reviewed and found to be -results pending. The patients anatomy ultrasound has been completed and reviewed with patient. TOP ST OH Reviewed. A 28 week lab panel was ordered. This includes a (HH, 1 hr GTT, U/A C&S). The patient is to complete this in the next two to four weeks. The S/S of Pre-Eclampsia were reviewed with the patient in detail. She is to report any of these if they occur. She currently denies any of these. The patient is RH positive Rhogam Ordered no    The patient was instructed on fetal kick counts and was given a kick sheet to complete every 8 hours. This is to begin at 28 weeks gestation. She was instructed that the baby should move at a minimum of ten times within one hour after a meal. The patient was instructed to lay down on her left side twenty minutes after eating and count movements for up to one hour with a target value of ten movements. She was instructed to notify the office if she did not make that target after two attempts or if after any attempt there was less than four movements. 2-19 pt DECLINED flu vaccine      Assessment:  1. Lisa Devries is a 34 y.o. female  2.    3. 22w6d    Patient Active Problem List    Diagnosis Date Noted    TSH deficiency 2019     Priority: High     2019 repeat with second trimester labwork      Second pregnancy 01/15/2019     Priority: High    Mood disorder ([address] 2016     Priority: High     14 F Apg 8/ Wt 6#15 2014     Priority: High    Spontaneous onset of labor 08/10/2014     Priority: High    Nexplanon in place 2018     Priority: Low    Cannabis abuse 2016    Anemia due to blood loss, acute 2014     Pp Hgb 8.7  Hemodynamically stable, asymptomatic  Started on Niferex PPD #1      HRP (high risk pregnancy) 08/10/2014     Rh+/RI/GBSneg      Chlamydia infection, current pregnancy 08/10/2014     Treated 14  Needs AYDIN          Diagnosis Orders   1. 22 weeks gestation of pregnancy     2. TSH deficiency  TSH with Reflex   3. Second pregnancy           Plan:  The patient will return to the office for her next visit in 4 weeks. See antepartum flow sheet. Lab slip given to repeat TSH level. Maternal Quad screen pending.

## 2019-05-08 ENCOUNTER — HOSPITAL ENCOUNTER (OUTPATIENT)
Age: 29
Discharge: HOME | End: 2019-05-08
Attending: OBSTETRICS & GYNECOLOGY | Admitting: OBSTETRICS & GYNECOLOGY
Payer: COMMERCIAL

## 2019-05-08 VITALS — TEMPERATURE: 97.7 F | SYSTOLIC BLOOD PRESSURE: 113 MMHG | HEART RATE: 77 BPM | DIASTOLIC BLOOD PRESSURE: 60 MMHG

## 2019-05-08 PROBLEM — O09.92 HRP (HIGH RISK PREGNANCY), SECOND TRIMESTER: Status: ACTIVE | Noted: 2019-05-08

## 2019-05-08 LAB
-: ABNORMAL
AMORPHOUS: ABNORMAL
BACTERIA: ABNORMAL
BILIRUBIN URINE: NEGATIVE
CASTS UA: ABNORMAL /LPF
COLOR: YELLOW
COMMENT UA: ABNORMAL
CRYSTALS, UA: ABNORMAL /HPF
DIRECT EXAM: ABNORMAL
EPITHELIAL CELLS UA: ABNORMAL /HPF
GLUCOSE URINE: NEGATIVE
KETONES, URINE: NEGATIVE
LEUKOCYTE ESTERASE, URINE: NEGATIVE
Lab: ABNORMAL
MUCUS: ABNORMAL
NITRITE, URINE: NEGATIVE
OTHER OBSERVATIONS UA: ABNORMAL
PH UA: 7 (ref 5–8)
PROTEIN UA: NEGATIVE
RBC UA: ABNORMAL /HPF
RENAL EPITHELIAL, UA: ABNORMAL /HPF
SPECIFIC GRAVITY UA: 1.01 (ref 1–1.03)
SPECIMEN DESCRIPTION: ABNORMAL
TRICHOMONAS: ABNORMAL
TURBIDITY: ABNORMAL
URINE HGB: ABNORMAL
UROBILINOGEN, URINE: NORMAL
WBC UA: ABNORMAL /HPF
YEAST: ABNORMAL

## 2019-05-08 PROCEDURE — 87510 GARDNER VAG DNA DIR PROBE: CPT

## 2019-05-08 PROCEDURE — 6370000000 HC RX 637 (ALT 250 FOR IP): Performed by: STUDENT IN AN ORGANIZED HEALTH CARE EDUCATION/TRAINING PROGRAM

## 2019-05-08 PROCEDURE — 87480 CANDIDA DNA DIR PROBE: CPT

## 2019-05-08 PROCEDURE — 81001 URINALYSIS AUTO W/SCOPE: CPT

## 2019-05-08 PROCEDURE — 87660 TRICHOMONAS VAGIN DIR PROBE: CPT

## 2019-05-08 PROCEDURE — 87591 N.GONORRHOEAE DNA AMP PROB: CPT

## 2019-05-08 PROCEDURE — 76815 OB US LIMITED FETUS(S): CPT

## 2019-05-08 PROCEDURE — 87491 CHLMYD TRACH DNA AMP PROBE: CPT

## 2019-05-08 RX ORDER — CEPHALEXIN 500 MG/1
500 CAPSULE ORAL 4 TIMES DAILY
Qty: 28 CAPSULE | Refills: 0 | Status: SHIPPED | OUTPATIENT
Start: 2019-05-08 | End: 2019-05-15

## 2019-05-08 RX ORDER — METRONIDAZOLE 500 MG/1
500 TABLET ORAL 2 TIMES DAILY
Qty: 14 TABLET | Refills: 0 | Status: SHIPPED | OUTPATIENT
Start: 2019-05-08 | End: 2019-05-15

## 2019-05-08 RX ORDER — ACETAMINOPHEN 500 MG
1000 TABLET ORAL EVERY 6 HOURS PRN
Status: DISCONTINUED | OUTPATIENT
Start: 2019-05-08 | End: 2019-05-08 | Stop reason: HOSPADM

## 2019-05-08 RX ADMIN — ACETAMINOPHEN 1000 MG: 500 TABLET, FILM COATED ORAL at 03:50

## 2019-05-08 ASSESSMENT — PAIN SCALES - GENERAL
PAINLEVEL_OUTOF10: 5
PAINLEVEL_OUTOF10: 3

## 2019-05-08 NOTE — FLOWSHEET NOTE
Patient admitted to room 168 from ER per wheelchair. Here with c/o vaginal bleeding. Denies ROM. Denies N/V/D. Denies fever/chills. Relates of + fetal movement. Assisted into bed, Siderails up x2. Call light in reach. Bed in low position. Oriented to room, surroundings, call system and plan of care. Patient verbalizes understanding. EFM applied and monitor test completed/passed.

## 2019-05-08 NOTE — H&P
PHYSICAL EXAM:  Fetal Heart Monitor:  Baseline Heart Rate 150, moderate variability, present accelerations, absent decelerations  [place]: contractions, none    General appearance:  no apparent distress, alert and cooperative  Neurologic:  alert, oriented, normal speech, no focal findings or movement disorder noted  Lungs:  No increased work of breathing, good air exchange, clear to auscultation bilaterally, no crackles or wheezing  Heart:  regular rate and rhythm and no murmur    Abdomen:  soft, gravid, non-tender, no right upper quadrant tenderness, no CVA tenderness, uterus non-tender, no signs of abruption and no signs of chorioamnionitis  Extremities:  no calf tenderness, non edematous, DTR's: normal    Pelvic Exam:   Speculum: Normal appearing external genitalia, vulva and vagina without edema, erythema or masses, no gross blood, cervix visually closed with thickness, normal appearing discharge       LIMITED BEDSIDE US:  Position: Cephalic  Placental Location: fundal  Fetal Heart Tones: Present  Fetal Movement: Present  Amniotic Fluid Index/Volume: >2 x2 cm pocket   No retroplacental clots or hematomas     PRENATAL LAB RESULTS:   Blood Type/Rh: O pos  Antibody Screen: negative  Hemoglobin, Hematocrit, Platelets: 44.0  / 36.7 / 418  Rubella: immune  T. Pallidum, IgG: non-reactive   Hepatitis B Surface Antigen: non-reactive   HIV: negative   Sickle Cell Screen: not available  Gonorrhea: negative  Chlamydia: negative  Urine culture: negative, date: 2019    1 hour Glucose Tolerance Test: not done yet     Group B Strep: negative  Cystic Fibrosis Screen: not available  First Trimester Screen: not available  MSAFP/Multiple Markers: normal  Non-Invasive Prenatal Testing: not available  Anatomy US: fundal, 3vc, normal anatomy     ASSESSMENT & PLAN:  Teetee Kathleen is a 34 y.o. female  at 26w5d IUP   - GBS negative / Rh positive / R immune   - No indication for GBS prophylaxis    Bleeding   - VSS.

## 2019-05-09 LAB
C TRACH DNA GENITAL QL NAA+PROBE: NEGATIVE
N. GONORRHOEAE DNA: NEGATIVE
SPECIMEN DESCRIPTION: NORMAL

## 2019-05-10 ENCOUNTER — ROUTINE PRENATAL (OUTPATIENT)
Dept: OBGYN CLINIC | Age: 29
End: 2019-05-10
Payer: COMMERCIAL

## 2019-05-10 VITALS
HEART RATE: 72 BPM | WEIGHT: 156 LBS | SYSTOLIC BLOOD PRESSURE: 96 MMHG | DIASTOLIC BLOOD PRESSURE: 60 MMHG | BODY MASS INDEX: 30.47 KG/M2

## 2019-05-10 DIAGNOSIS — O09.92 HIGH-RISK PREGNANCY IN SECOND TRIMESTER: Primary | ICD-10-CM

## 2019-05-10 DIAGNOSIS — Z3A.27 27 WEEKS GESTATION OF PREGNANCY: ICD-10-CM

## 2019-05-10 DIAGNOSIS — Z34.90 SECOND PREGNANCY: ICD-10-CM

## 2019-05-10 DIAGNOSIS — E03.8 TSH DEFICIENCY: ICD-10-CM

## 2019-05-10 PROCEDURE — 99213 OFFICE O/P EST LOW 20 MIN: CPT | Performed by: NURSE PRACTITIONER

## 2019-05-10 PROCEDURE — G8417 CALC BMI ABV UP PARAM F/U: HCPCS | Performed by: NURSE PRACTITIONER

## 2019-05-10 PROCEDURE — 1036F TOBACCO NON-USER: CPT | Performed by: NURSE PRACTITIONER

## 2019-05-10 PROCEDURE — G8427 DOCREV CUR MEDS BY ELIG CLIN: HCPCS | Performed by: NURSE PRACTITIONER

## 2019-05-13 ENCOUNTER — TELEPHONE (OUTPATIENT)
Dept: OBGYN CLINIC | Age: 29
End: 2019-05-13

## 2019-05-13 ENCOUNTER — HOSPITAL ENCOUNTER (OUTPATIENT)
Age: 29
Discharge: HOME | End: 2019-05-13
Payer: COMMERCIAL

## 2019-05-13 DIAGNOSIS — O99.810 ABNORMAL GLUCOSE TOLERANCE TEST (GTT) DURING PREGNANCY, ANTEPARTUM: Primary | ICD-10-CM

## 2019-05-13 DIAGNOSIS — Z3A.27 27 WEEKS GESTATION OF PREGNANCY: ICD-10-CM

## 2019-05-13 DIAGNOSIS — E03.8 TSH DEFICIENCY: ICD-10-CM

## 2019-05-13 LAB
-: ABNORMAL
ABSOLUTE EOS #: 0.1 K/UL (ref 0–0.4)
ABSOLUTE IMMATURE GRANULOCYTE: ABNORMAL K/UL (ref 0–0.3)
ABSOLUTE LYMPH #: 2.3 K/UL (ref 1–4.8)
ABSOLUTE MONO #: 0.8 K/UL (ref 0.1–1.3)
AMORPHOUS: ABNORMAL
BACTERIA: ABNORMAL
BASOPHILS # BLD: 0 % (ref 0–2)
BASOPHILS ABSOLUTE: 0.1 K/UL (ref 0–0.2)
BILIRUBIN URINE: NEGATIVE
CASTS UA: ABNORMAL /LPF
COLOR: YELLOW
COMMENT UA: ABNORMAL
CRYSTALS, UA: ABNORMAL /HPF
DIFFERENTIAL TYPE: ABNORMAL
EOSINOPHILS RELATIVE PERCENT: 1 % (ref 0–4)
EPITHELIAL CELLS UA: ABNORMAL /HPF
GLUCOSE ADMINISTRATION: ABNORMAL
GLUCOSE TOLERANCE SCREEN 50G: 148 MG/DL (ref 70–135)
GLUCOSE URINE: NEGATIVE
HCT VFR BLD CALC: 35.5 % (ref 36–46)
HEMOGLOBIN: 11.9 G/DL (ref 12–16)
IMMATURE GRANULOCYTES: ABNORMAL %
KETONES, URINE: NEGATIVE
LEUKOCYTE ESTERASE, URINE: ABNORMAL
LYMPHOCYTES # BLD: 16 % (ref 24–44)
MCH RBC QN AUTO: 31.2 PG (ref 26–34)
MCHC RBC AUTO-ENTMCNC: 33.4 G/DL (ref 31–37)
MCV RBC AUTO: 93.4 FL (ref 80–100)
MONOCYTES # BLD: 6 % (ref 1–7)
MUCUS: ABNORMAL
NITRITE, URINE: NEGATIVE
NRBC AUTOMATED: ABNORMAL PER 100 WBC
OTHER OBSERVATIONS UA: ABNORMAL
PDW BLD-RTO: 13.2 % (ref 11.5–14.9)
PH UA: 8.5 (ref 5–8)
PLATELET # BLD: 431 K/UL (ref 150–450)
PLATELET ESTIMATE: ABNORMAL
PMV BLD AUTO: 6.5 FL (ref 6–12)
PROTEIN UA: NEGATIVE
RBC # BLD: 3.8 M/UL (ref 4–5.2)
RBC # BLD: ABNORMAL 10*6/UL
RBC UA: ABNORMAL /HPF
RENAL EPITHELIAL, UA: ABNORMAL /HPF
SEG NEUTROPHILS: 77 % (ref 36–66)
SEGMENTED NEUTROPHILS ABSOLUTE COUNT: 11.3 K/UL (ref 1.3–9.1)
SPECIFIC GRAVITY UA: 1.02 (ref 1–1.03)
TRICHOMONAS: ABNORMAL
TSH SERPL DL<=0.05 MIU/L-ACNC: 1.73 MIU/L (ref 0.3–5)
TURBIDITY: ABNORMAL
URINE HGB: NEGATIVE
UROBILINOGEN, URINE: NORMAL
WBC # BLD: 14.6 K/UL (ref 3.5–11)
WBC # BLD: ABNORMAL 10*3/UL
WBC UA: ABNORMAL /HPF
YEAST: ABNORMAL

## 2019-05-13 PROCEDURE — 81001 URINALYSIS AUTO W/SCOPE: CPT

## 2019-05-13 PROCEDURE — 82950 GLUCOSE TEST: CPT

## 2019-05-13 PROCEDURE — 85025 COMPLETE CBC W/AUTO DIFF WBC: CPT

## 2019-05-13 PROCEDURE — 87086 URINE CULTURE/COLONY COUNT: CPT

## 2019-05-13 PROCEDURE — 84443 ASSAY THYROID STIM HORMONE: CPT

## 2019-05-13 PROCEDURE — 36415 COLL VENOUS BLD VENIPUNCTURE: CPT

## 2019-05-13 NOTE — TELEPHONE ENCOUNTER
Attempted to call patient related to 1 hour glucose levels and the need for follow up testing per Sepideh Shea CNP. Left message for patient to return call.

## 2019-05-13 NOTE — TELEPHONE ENCOUNTER
----- Message from HALEY Ramsay CNP sent at 5/13/2019  1:10 PM EDT -----  Abnormal 1 hour GTT  Please order 3 hour GTT and Hgb A1c. Hold UA for C&S results.

## 2019-05-14 LAB
CULTURE: NORMAL
Lab: NORMAL
SPECIMEN DESCRIPTION: NORMAL

## 2019-05-15 ENCOUNTER — HOSPITAL ENCOUNTER (OUTPATIENT)
Age: 29
Discharge: HOME | End: 2019-05-15
Payer: COMMERCIAL

## 2019-05-15 DIAGNOSIS — O99.810 ABNORMAL GLUCOSE TOLERANCE TEST (GTT) DURING PREGNANCY, ANTEPARTUM: ICD-10-CM

## 2019-05-15 LAB
3 HR GLUCOSE: 78 MG/DL (ref 65–139)
AMOUNT GLUCOSE GIVEN: 100 G
ESTIMATED AVERAGE GLUCOSE: 97 MG/DL
GLUCOSE FASTING: 87 MG/DL (ref 65–94)
GLUCOSE TOLERANCE TEST 1 HOUR: 147 MG/DL (ref 65–179)
GLUCOSE TOLERANCE TEST 2 HOUR: 109 MG/DL (ref 65–154)
HBA1C MFR BLD: 5 % (ref 4–6)

## 2019-05-15 PROCEDURE — 82951 GLUCOSE TOLERANCE TEST (GTT): CPT

## 2019-05-15 PROCEDURE — 83036 HEMOGLOBIN GLYCOSYLATED A1C: CPT

## 2019-05-15 PROCEDURE — 82952 GTT-ADDED SAMPLES: CPT

## 2019-05-15 PROCEDURE — 36415 COLL VENOUS BLD VENIPUNCTURE: CPT

## 2019-05-22 ENCOUNTER — ROUTINE PRENATAL (OUTPATIENT)
Dept: OBGYN CLINIC | Age: 29
End: 2019-05-22
Payer: COMMERCIAL

## 2019-05-22 VITALS
SYSTOLIC BLOOD PRESSURE: 112 MMHG | DIASTOLIC BLOOD PRESSURE: 68 MMHG | WEIGHT: 155 LBS | HEART RATE: 67 BPM | BODY MASS INDEX: 30.27 KG/M2

## 2019-05-22 DIAGNOSIS — O99.810 ABNORMAL GLUCOSE TOLERANCE TEST (GTT) DURING PREGNANCY, ANTEPARTUM: ICD-10-CM

## 2019-05-22 DIAGNOSIS — Z34.90 SECOND PREGNANCY: ICD-10-CM

## 2019-05-22 PROCEDURE — 1036F TOBACCO NON-USER: CPT | Performed by: OBSTETRICS & GYNECOLOGY

## 2019-05-22 PROCEDURE — G8427 DOCREV CUR MEDS BY ELIG CLIN: HCPCS | Performed by: OBSTETRICS & GYNECOLOGY

## 2019-05-22 PROCEDURE — G8417 CALC BMI ABV UP PARAM F/U: HCPCS | Performed by: OBSTETRICS & GYNECOLOGY

## 2019-05-22 PROCEDURE — 99213 OFFICE O/P EST LOW 20 MIN: CPT | Performed by: OBSTETRICS & GYNECOLOGY

## 2019-05-22 RX ORDER — FAMOTIDINE 20 MG/1
20 TABLET, FILM COATED ORAL 2 TIMES DAILY
Qty: 60 TABLET | Refills: 3 | Status: SHIPPED | OUTPATIENT
Start: 2019-05-22 | End: 2019-11-18

## 2019-05-22 NOTE — PROGRESS NOTES
Raymond Dyson is a 34 y.o. female 30w6d        OB History    Para Term  AB Living   2 1 1 0 0 1   SAB TAB Ectopic Molar Multiple Live Births           0 1      # Outcome Date GA Lbr Kevin/2nd Weight Sex Delivery Anes PTL Lv   2 Current            1 Term 2014 38w1d  6 lb 14 oz (3.118 kg) F Vag-Spont   JOAN       Vitals  BP: 112/68  Weight: 155 lb (70.3 kg)  Pulse: 67  Patient Position: Sitting  Albumin: Negative  Glucose: Negative      The patient was seen and evaluated. There was positive fetal movements. No contractions or leakage of fluid. Signs and symptoms of  labor as well as labor were reviewed. The S/S of Pre-Eclampsia were reviewed with the patient in detail. She is to report any of these if they occur. She currently denies any of these. The patient had her 28 week labs completed. 5/10/19 pt declined tdap  - pt DECLINED flu vaccine      T-Dap Vaccine (27-36 weeks): declined    The patient was instructed on fetal kick counts and was given a kick sheet to complete every 8 hours. She was instructed that the baby should move at a minimum of ten times within one hour after a meal. The patient was instructed to lay down on her left side twenty minutes after eating and count movements for up to one hour with a target value of ten movements. She was instructed to notify the office if she did not make that target after two attempts or if after any attempt there was less than four movements. The patient reports that the targets have been made Yes. Assessment:  1. Raymond Dyson is a 34 y.o. female  2.    3. 28w5d    Patient Active Problem List    Diagnosis Date Noted    TSH deficiency 2019     Priority: High     2019 repeat with second trimester labwork      Second pregnancy 01/15/2019     Priority: High    Mood disorder ([facility] [address] 2016     Priority: High    Nexplanon in place 2018     Priority: Low    Abnormal glucose tolerance test (GTT) during pregnancy, antepartum 2019 3 hour GTT and hgb a1c ordered      HRP (high risk pregnancy), second trimester 2019    Cannabis abuse 2016    Hx Chlamydia infection, current pregnancy 08/10/2014     Treated 14  Needs AYDIN          Diagnosis Orders   1. Second pregnancy     2. Abnormal glucose tolerance test (GTT) during pregnancy, antepartum               Plan:  The patient will return to the office for her next visit in 2 weeks. See antepartum flow sheet.       Testing Indicated: No  Scheduled with Nursing-Pt notified: No

## 2019-06-03 ENCOUNTER — TELEPHONE (OUTPATIENT)
Dept: OBGYN CLINIC | Age: 29
End: 2019-06-03

## 2019-06-03 DIAGNOSIS — R31.9 HEMATURIA, UNSPECIFIED TYPE: Primary | ICD-10-CM

## 2019-06-03 NOTE — TELEPHONE ENCOUNTER
Patient called the office this morning stating that when she wiped after urinating yesterday morning, the toilet paper had blood tinged urine. Patient did not have any bleeding, discharge, or fluid leaking. Patient denied cramping or pelvic pain, stating that the baby has been actively moving and the blood tinged wipe was just one occurrence. Nurse practitioner Tamie Steiner notified. Per provider, urinalysis ordered. Patient instructed to monitor situation and if bleeding, pelvic pain, or cramping arise then she is to go to the ER or call office. Patient verbalized an understanding of information provided. Patient has appt on 6/10/19.

## 2019-06-10 ENCOUNTER — ROUTINE PRENATAL (OUTPATIENT)
Dept: OBGYN CLINIC | Age: 29
End: 2019-06-10
Payer: COMMERCIAL

## 2019-06-10 VITALS
BODY MASS INDEX: 29.88 KG/M2 | DIASTOLIC BLOOD PRESSURE: 60 MMHG | SYSTOLIC BLOOD PRESSURE: 100 MMHG | WEIGHT: 153 LBS | HEART RATE: 68 BPM

## 2019-06-10 DIAGNOSIS — Z3A.31 31 WEEKS GESTATION OF PREGNANCY: Primary | ICD-10-CM

## 2019-06-10 DIAGNOSIS — O99.810 ABNORMAL GLUCOSE TOLERANCE TEST (GTT) DURING PREGNANCY, ANTEPARTUM: ICD-10-CM

## 2019-06-10 DIAGNOSIS — Z34.90 SECOND PREGNANCY: ICD-10-CM

## 2019-06-10 PROCEDURE — 99213 OFFICE O/P EST LOW 20 MIN: CPT | Performed by: NURSE PRACTITIONER

## 2019-06-10 PROCEDURE — 1036F TOBACCO NON-USER: CPT | Performed by: NURSE PRACTITIONER

## 2019-06-10 PROCEDURE — G8427 DOCREV CUR MEDS BY ELIG CLIN: HCPCS | Performed by: NURSE PRACTITIONER

## 2019-06-10 PROCEDURE — G8417 CALC BMI ABV UP PARAM F/U: HCPCS | Performed by: NURSE PRACTITIONER

## 2019-06-10 NOTE — PROGRESS NOTES
Gayathri Woody is a 34 y.o. female 31w3d        OB History    Para Term  AB Living   2 1 1 0 0 1   SAB TAB Ectopic Molar Multiple Live Births           0 1      # Outcome Date GA Lbr Kevin/2nd Weight Sex Delivery Anes PTL Lv   2 Current            1 Term 2014 38w1d  6 lb 14 oz (3.118 kg) F Vag-Spont   JONA       Vitals  BP: 100/60  Weight: 153 lb (69.4 kg)  Pulse: 68  Patient Position: Sitting  Albumin: Negative  Glucose: Negative  Movement: Present      The patient was seen and evaluated. There was positive fetal movements. No contractions or leakage of fluid. Signs and symptoms of  labor as well as labor were reviewed. The S/S of Pre-Eclampsia were reviewed with the patient in detail. She is to report any of these if they occur. She currently denies any of these. The patient had her 28 week labs completed. 5/10/19 pt declined tdap  - pt DECLINED flu vaccine      T-Dap Vaccine (27-36 weeks): declines    The patient was instructed on fetal kick counts and was given a kick sheet to complete every 8 hours. She was instructed that the baby should move at a minimum of ten times within one hour after a meal. The patient was instructed to lay down on her left side twenty minutes after eating and count movements for up to one hour with a target value of ten movements. She was instructed to notify the office if she did not make that target after two attempts or if after any attempt there was less than four movements. The patient reports that the targets have been made Yes.  Testing:  Not indicated    Assessment:  1. Gayathri Woody is a 34 y.o. female  2.    3. 31w3d    Patient Active Problem List    Diagnosis Date Noted    Abnormal glucose tolerance test (GTT) during pregnancy, antepartum 2019     Priority: High     2019 3 hour GTT and hgb a1c ordered  3 hour GTT WNL      TSH deficiency 2019     Priority: High     2019 repeat

## 2019-06-10 NOTE — LETTER
[facility]  [address] [place]  [address] [facility] [address]  Phone: [phone]  Fax: [phone] Located within [facility], APRN - CNP        Ragini 10, 2019     Patient: Leida Driscoll   YOB: 1990   Date of Visit: 6/10/2019       To Whom it May Concern:    Nora Villa was seen in my clinic on 6/10/2019. She needs to start her maternity leave as of 06/10/2019  Due to late pregnancy. If you have any questions or concerns, please don't hesitate to call.     Sincerely,         HALEY Seaman - CNP

## 2019-06-12 ENCOUNTER — OFFICE VISIT (OUTPATIENT)
Dept: OBGYN CLINIC | Age: 29
End: 2019-06-12
Payer: COMMERCIAL

## 2019-06-12 DIAGNOSIS — Z3A.31 31 WEEKS GESTATION OF PREGNANCY: ICD-10-CM

## 2019-06-12 DIAGNOSIS — Z34.90 SECOND PREGNANCY: ICD-10-CM

## 2019-06-12 LAB
ABDOMINAL CIRCUMFERENCE: NORMAL CM
BIPARIETAL DIAMETER: NORMAL CM
ESTIMATED FETAL WEIGHT: NORMAL GRAMS
FEMORAL DIAMETER: NORMAL CM
FEMORAL LENGTH: NORMAL CM
HC/AC: NORMAL
HEAD CIRCUMFERENCE: NORMAL CM

## 2019-06-12 PROCEDURE — 76815 OB US LIMITED FETUS(S): CPT | Performed by: OBSTETRICS & GYNECOLOGY

## 2019-06-27 ENCOUNTER — ROUTINE PRENATAL (OUTPATIENT)
Dept: OBGYN CLINIC | Age: 29
End: 2019-06-27
Payer: COMMERCIAL

## 2019-06-27 VITALS
HEART RATE: 76 BPM | SYSTOLIC BLOOD PRESSURE: 102 MMHG | DIASTOLIC BLOOD PRESSURE: 56 MMHG | BODY MASS INDEX: 30.47 KG/M2 | WEIGHT: 156 LBS

## 2019-06-27 DIAGNOSIS — Z3A.33 33 WEEKS GESTATION OF PREGNANCY: ICD-10-CM

## 2019-06-27 DIAGNOSIS — Z34.90 SECOND PREGNANCY: ICD-10-CM

## 2019-06-27 DIAGNOSIS — E03.8 TSH DEFICIENCY: ICD-10-CM

## 2019-06-27 DIAGNOSIS — O09.93 HIGH-RISK PREGNANCY IN THIRD TRIMESTER: Primary | ICD-10-CM

## 2019-06-27 DIAGNOSIS — O99.810 ABNORMAL GLUCOSE TOLERANCE TEST (GTT) DURING PREGNANCY, ANTEPARTUM: ICD-10-CM

## 2019-06-27 PROCEDURE — G8427 DOCREV CUR MEDS BY ELIG CLIN: HCPCS | Performed by: OBSTETRICS & GYNECOLOGY

## 2019-06-27 PROCEDURE — 1036F TOBACCO NON-USER: CPT | Performed by: OBSTETRICS & GYNECOLOGY

## 2019-06-27 PROCEDURE — G8417 CALC BMI ABV UP PARAM F/U: HCPCS | Performed by: OBSTETRICS & GYNECOLOGY

## 2019-06-27 PROCEDURE — 99213 OFFICE O/P EST LOW 20 MIN: CPT | Performed by: OBSTETRICS & GYNECOLOGY

## 2019-06-27 NOTE — PROGRESS NOTES
tolerance test (GTT) during pregnancy, antepartum 2019 3 hour GTT and hgb a1c ordered  3 hour GTT WNL      HRP (high risk pregnancy), second trimester 2019    Cannabis abuse 2016    Hx Chlamydia infection, current pregnancy 08/10/2014     Treated 14  Needs AYDIN          Diagnosis Orders   1. High-risk pregnancy in third trimester     2. TSH deficiency     3. Second pregnancy     4. Abnormal glucose tolerance test (GTT) during pregnancy, antepartum     5. 33 weeks gestation of pregnancy               Plan:  The patient will return to the office for her next visit in 2 weeks. See antepartum flow sheet.       Testing Indicated: No  Scheduled with Nursing-Pt notified: No

## 2019-07-10 ENCOUNTER — ROUTINE PRENATAL (OUTPATIENT)
Dept: OBGYN CLINIC | Age: 29
End: 2019-07-10
Payer: COMMERCIAL

## 2019-07-10 VITALS — WEIGHT: 159 LBS | SYSTOLIC BLOOD PRESSURE: 104 MMHG | DIASTOLIC BLOOD PRESSURE: 62 MMHG | BODY MASS INDEX: 31.05 KG/M2

## 2019-07-10 DIAGNOSIS — Z34.90 SECOND PREGNANCY: ICD-10-CM

## 2019-07-10 DIAGNOSIS — Z3A.35 35 WEEKS GESTATION OF PREGNANCY: Primary | ICD-10-CM

## 2019-07-10 DIAGNOSIS — E03.8 TSH DEFICIENCY: ICD-10-CM

## 2019-07-10 DIAGNOSIS — O99.810 ABNORMAL GLUCOSE TOLERANCE TEST (GTT) DURING PREGNANCY, ANTEPARTUM: ICD-10-CM

## 2019-07-10 PROCEDURE — 99213 OFFICE O/P EST LOW 20 MIN: CPT | Performed by: NURSE PRACTITIONER

## 2019-07-10 PROCEDURE — 1036F TOBACCO NON-USER: CPT | Performed by: NURSE PRACTITIONER

## 2019-07-10 PROCEDURE — G8427 DOCREV CUR MEDS BY ELIG CLIN: HCPCS | Performed by: NURSE PRACTITIONER

## 2019-07-10 PROCEDURE — G8417 CALC BMI ABV UP PARAM F/U: HCPCS | Performed by: NURSE PRACTITIONER

## 2019-07-16 ENCOUNTER — ROUTINE PRENATAL (OUTPATIENT)
Dept: OBGYN CLINIC | Age: 29
End: 2019-07-16
Payer: COMMERCIAL

## 2019-07-16 ENCOUNTER — HOSPITAL ENCOUNTER (OUTPATIENT)
Age: 29
Setting detail: SPECIMEN
Discharge: HOME | End: 2019-07-16
Payer: COMMERCIAL

## 2019-07-16 VITALS
WEIGHT: 160 LBS | SYSTOLIC BLOOD PRESSURE: 108 MMHG | BODY MASS INDEX: 31.25 KG/M2 | HEART RATE: 72 BPM | DIASTOLIC BLOOD PRESSURE: 72 MMHG

## 2019-07-16 DIAGNOSIS — Z3A.36 36 WEEKS GESTATION OF PREGNANCY: ICD-10-CM

## 2019-07-16 DIAGNOSIS — O09.93 HIGH-RISK PREGNANCY IN THIRD TRIMESTER: Primary | ICD-10-CM

## 2019-07-16 DIAGNOSIS — Z34.90 SECOND PREGNANCY: ICD-10-CM

## 2019-07-16 DIAGNOSIS — O99.810 ABNORMAL GLUCOSE TOLERANCE TEST (GTT) DURING PREGNANCY, ANTEPARTUM: ICD-10-CM

## 2019-07-16 DIAGNOSIS — E03.8 TSH DEFICIENCY: ICD-10-CM

## 2019-07-16 PROCEDURE — 87081 CULTURE SCREEN ONLY: CPT

## 2019-07-16 PROCEDURE — 99213 OFFICE O/P EST LOW 20 MIN: CPT | Performed by: OBSTETRICS & GYNECOLOGY

## 2019-07-16 PROCEDURE — G8417 CALC BMI ABV UP PARAM F/U: HCPCS | Performed by: OBSTETRICS & GYNECOLOGY

## 2019-07-16 PROCEDURE — G8427 DOCREV CUR MEDS BY ELIG CLIN: HCPCS | Performed by: OBSTETRICS & GYNECOLOGY

## 2019-07-16 PROCEDURE — 1036F TOBACCO NON-USER: CPT | Performed by: OBSTETRICS & GYNECOLOGY

## 2019-07-16 NOTE — PROGRESS NOTES
Nathanael Mcguire is a 34 y.o. female 36w4d        OB History    Para Term  AB Living   2 1 1 0 0 1   SAB TAB Ectopic Molar Multiple Live Births           0 1      # Outcome Date GA Lbr Kevin/2nd Weight Sex Delivery Anes PTL Lv   2 Current            1 Term 2014 38w1d  6 lb 14 oz (3.118 kg) F Vag-Spont   JOAN         Vitals  BP: 108/72  Weight: 160 lb (72.6 kg)  Pulse: 72  Patient Position: Sitting  Albumin: Negative  Glucose: Negative      The patient was seen and evaluated. There was positive fetal movements. No contractions or leakage of fluid. Signs and symptoms of labor were reviewed. The S/S of Pre-Eclampsia were reviewed with the patient in detail. She is to report any of these if they occur. She currently denies any of these. The patient was instructed on fetal kick counts and was given a kick sheet to complete every 8 hours. She was instructed that the baby should move at a minimum of ten times within one hour after a meal. The patient was instructed to lay down on her left side twenty minutes after eating and count movements for up to one hour with a target value of ten movements. She was instructed to notify the office if she did not make that target after two attempts or if after any attempt there was less than four movements. The patient reports that the targets have been made Yes.    5/10/19 pt declined tdap  19 pt DECLINED flu vaccine      T-Dap Vaccine (27-36 weeks) Completed: No    Allergies: Allergies as of 2019    (No Known Allergies)       Group Beta Strep collection was completed. Yes   GBS Results:   No visits with results within 1 Week(s) from this visit.    Latest known visit with results is:   Hospital Outpatient Visit on 05/15/2019   Component Date Value Ref Range Status    Hemoglobin A1C 05/15/2019 5.0  4.0 - 6.0 % Final    Estimated Avg Glucose 05/15/2019 97  mg/dL Final    Comment: The ADA and AACC recommend providing the estimated average

## 2019-07-19 LAB
CULTURE: NORMAL
Lab: NORMAL
SPECIMEN DESCRIPTION: NORMAL

## 2019-07-22 ENCOUNTER — TELEPHONE (OUTPATIENT)
Dept: OBGYN CLINIC | Age: 29
End: 2019-07-22

## 2019-07-22 ENCOUNTER — HOSPITAL ENCOUNTER (OUTPATIENT)
Age: 29
Discharge: HOME | End: 2019-07-22
Attending: OBSTETRICS & GYNECOLOGY | Admitting: OBSTETRICS & GYNECOLOGY
Payer: COMMERCIAL

## 2019-07-22 VITALS
RESPIRATION RATE: 16 BRPM | BODY MASS INDEX: 31.41 KG/M2 | DIASTOLIC BLOOD PRESSURE: 67 MMHG | HEART RATE: 71 BPM | TEMPERATURE: 97.9 F | WEIGHT: 160 LBS | HEIGHT: 60 IN | SYSTOLIC BLOOD PRESSURE: 118 MMHG

## 2019-07-22 PROBLEM — O09.90 HIGH RISK PREGNANCY, ANTEPARTUM: Status: ACTIVE | Noted: 2019-07-22

## 2019-07-22 PROBLEM — E03.8 TSH DEFICIENCY: Status: RESOLVED | Noted: 2019-01-16 | Resolved: 2019-07-22

## 2019-07-22 LAB
-: ABNORMAL
ABSOLUTE EOS #: 0.1 K/UL (ref 0–0.4)
ABSOLUTE IMMATURE GRANULOCYTE: ABNORMAL K/UL (ref 0–0.3)
ABSOLUTE LYMPH #: 2.2 K/UL (ref 1–4.8)
ABSOLUTE MONO #: 1 K/UL (ref 0.1–1.3)
AMORPHOUS: ABNORMAL
BACTERIA: ABNORMAL
BASOPHILS # BLD: 0 % (ref 0–2)
BASOPHILS ABSOLUTE: 0 K/UL (ref 0–0.2)
BILIRUBIN URINE: NEGATIVE
CASTS UA: ABNORMAL /LPF
COLOR: YELLOW
COMMENT UA: ABNORMAL
CRYSTALS, UA: ABNORMAL /HPF
DIFFERENTIAL TYPE: ABNORMAL
EOSINOPHILS RELATIVE PERCENT: 1 % (ref 0–4)
EPITHELIAL CELLS UA: ABNORMAL /HPF
GLUCOSE URINE: NEGATIVE
HCT VFR BLD CALC: 30.9 % (ref 36–46)
HEMOGLOBIN: 10.5 G/DL (ref 12–16)
IMMATURE GRANULOCYTES: ABNORMAL %
KETONES, URINE: NEGATIVE
LEUKOCYTE ESTERASE, URINE: ABNORMAL
LYMPHOCYTES # BLD: 20 % (ref 24–44)
MCH RBC QN AUTO: 29.5 PG (ref 26–34)
MCHC RBC AUTO-ENTMCNC: 33.9 G/DL (ref 31–37)
MCV RBC AUTO: 87 FL (ref 80–100)
MONOCYTES # BLD: 9 % (ref 1–7)
MUCUS: ABNORMAL
NITRITE, URINE: NEGATIVE
NRBC AUTOMATED: ABNORMAL PER 100 WBC
OTHER OBSERVATIONS UA: ABNORMAL
PDW BLD-RTO: 13.3 % (ref 11.5–14.9)
PH UA: 6 (ref 5–8)
PLATELET # BLD: 335 K/UL (ref 150–450)
PLATELET ESTIMATE: ABNORMAL
PMV BLD AUTO: 7.2 FL (ref 6–12)
PROTEIN UA: NEGATIVE
RBC # BLD: 3.55 M/UL (ref 4–5.2)
RBC # BLD: ABNORMAL 10*6/UL
RBC UA: ABNORMAL /HPF
RENAL EPITHELIAL, UA: ABNORMAL /HPF
SEG NEUTROPHILS: 70 % (ref 36–66)
SEGMENTED NEUTROPHILS ABSOLUTE COUNT: 7.6 K/UL (ref 1.3–9.1)
SPECIFIC GRAVITY UA: 1.02 (ref 1–1.03)
TRICHOMONAS: ABNORMAL
TSH SERPL DL<=0.05 MIU/L-ACNC: 4.07 MIU/L (ref 0.3–5)
TURBIDITY: ABNORMAL
URINE HGB: NEGATIVE
UROBILINOGEN, URINE: ABNORMAL
WBC # BLD: 10.9 K/UL (ref 3.5–11)
WBC # BLD: ABNORMAL 10*3/UL
WBC UA: ABNORMAL /HPF
YEAST: ABNORMAL

## 2019-07-22 PROCEDURE — 36415 COLL VENOUS BLD VENIPUNCTURE: CPT

## 2019-07-22 PROCEDURE — 81001 URINALYSIS AUTO W/SCOPE: CPT

## 2019-07-22 PROCEDURE — 96361 HYDRATE IV INFUSION ADD-ON: CPT

## 2019-07-22 PROCEDURE — 87086 URINE CULTURE/COLONY COUNT: CPT

## 2019-07-22 PROCEDURE — 2580000003 HC RX 258: Performed by: STUDENT IN AN ORGANIZED HEALTH CARE EDUCATION/TRAINING PROGRAM

## 2019-07-22 PROCEDURE — 76818 FETAL BIOPHYS PROFILE W/NST: CPT

## 2019-07-22 PROCEDURE — 96360 HYDRATION IV INFUSION INIT: CPT

## 2019-07-22 PROCEDURE — 84443 ASSAY THYROID STIM HORMONE: CPT

## 2019-07-22 PROCEDURE — 99213 OFFICE O/P EST LOW 20 MIN: CPT

## 2019-07-22 PROCEDURE — 6370000000 HC RX 637 (ALT 250 FOR IP): Performed by: STUDENT IN AN ORGANIZED HEALTH CARE EDUCATION/TRAINING PROGRAM

## 2019-07-22 PROCEDURE — 85025 COMPLETE CBC W/AUTO DIFF WBC: CPT

## 2019-07-22 PROCEDURE — 76815 OB US LIMITED FETUS(S): CPT

## 2019-07-22 RX ORDER — ACETAMINOPHEN 500 MG
1000 TABLET ORAL EVERY 6 HOURS PRN
Status: DISCONTINUED | OUTPATIENT
Start: 2019-07-22 | End: 2019-07-23 | Stop reason: HOSPADM

## 2019-07-22 RX ORDER — METRONIDAZOLE 500 MG/1
500 TABLET ORAL 2 TIMES DAILY WITH MEALS
Qty: 14 TABLET | Refills: 0 | Status: SHIPPED | OUTPATIENT
Start: 2019-07-22 | End: 2019-07-29

## 2019-07-22 RX ORDER — SODIUM CHLORIDE, SODIUM LACTATE, POTASSIUM CHLORIDE, AND CALCIUM CHLORIDE .6; .31; .03; .02 G/100ML; G/100ML; G/100ML; G/100ML
1000 INJECTION, SOLUTION INTRAVENOUS ONCE
Status: COMPLETED | OUTPATIENT
Start: 2019-07-22 | End: 2019-07-22

## 2019-07-22 RX ORDER — ONDANSETRON 2 MG/ML
4 INJECTION INTRAMUSCULAR; INTRAVENOUS EVERY 6 HOURS PRN
Status: DISCONTINUED | OUTPATIENT
Start: 2019-07-22 | End: 2019-07-23 | Stop reason: HOSPADM

## 2019-07-22 RX ORDER — CEPHALEXIN 500 MG/1
500 CAPSULE ORAL 4 TIMES DAILY
Qty: 40 CAPSULE | Refills: 0 | Status: ON HOLD | OUTPATIENT
Start: 2019-07-22 | End: 2019-07-31 | Stop reason: HOSPADM

## 2019-07-22 RX ADMIN — SODIUM CHLORIDE, POTASSIUM CHLORIDE, SODIUM LACTATE AND CALCIUM CHLORIDE 1000 ML: 600; 310; 30; 20 INJECTION, SOLUTION INTRAVENOUS at 20:15

## 2019-07-22 RX ADMIN — SODIUM CHLORIDE, POTASSIUM CHLORIDE, SODIUM LACTATE AND CALCIUM CHLORIDE 1000 ML: 600; 310; 30; 20 INJECTION, SOLUTION INTRAVENOUS at 21:13

## 2019-07-22 RX ADMIN — ACETAMINOPHEN 1000 MG: 500 TABLET, FILM COATED ORAL at 20:04

## 2019-07-22 ASSESSMENT — PAIN SCALES - GENERAL
PAINLEVEL_OUTOF10: 5
PAINLEVEL_OUTOF10: 5
PAINLEVEL_OUTOF10: 4

## 2019-07-22 ASSESSMENT — PAIN DESCRIPTION - LOCATION
LOCATION: BUTTOCKS
LOCATION: BUTTOCKS

## 2019-07-22 ASSESSMENT — PAIN DESCRIPTION - FREQUENCY
FREQUENCY: CONTINUOUS
FREQUENCY: CONTINUOUS

## 2019-07-22 ASSESSMENT — PAIN DESCRIPTION - PAIN TYPE
TYPE: ACUTE PAIN
TYPE: ACUTE PAIN

## 2019-07-22 ASSESSMENT — PAIN DESCRIPTION - DESCRIPTORS
DESCRIPTORS: PRESSURE
DESCRIPTORS: PRESSURE

## 2019-07-22 ASSESSMENT — PAIN DESCRIPTION - PROGRESSION: CLINICAL_PROGRESSION: GRADUALLY IMPROVING

## 2019-07-23 LAB
CULTURE: NORMAL
Lab: NORMAL
SPECIMEN DESCRIPTION: NORMAL

## 2019-07-24 ENCOUNTER — ROUTINE PRENATAL (OUTPATIENT)
Dept: OBGYN CLINIC | Age: 29
End: 2019-07-24
Payer: COMMERCIAL

## 2019-07-24 VITALS
SYSTOLIC BLOOD PRESSURE: 104 MMHG | BODY MASS INDEX: 32.42 KG/M2 | HEART RATE: 97 BPM | DIASTOLIC BLOOD PRESSURE: 66 MMHG | WEIGHT: 166 LBS

## 2019-07-24 DIAGNOSIS — O99.810 ABNORMAL GLUCOSE TOLERANCE TEST (GTT) DURING PREGNANCY, ANTEPARTUM: ICD-10-CM

## 2019-07-24 DIAGNOSIS — E03.8 TSH DEFICIENCY: ICD-10-CM

## 2019-07-24 DIAGNOSIS — Z3A.36 36 WEEKS GESTATION OF PREGNANCY: ICD-10-CM

## 2019-07-24 DIAGNOSIS — O09.93 HIGH-RISK PREGNANCY IN THIRD TRIMESTER: Primary | ICD-10-CM

## 2019-07-24 DIAGNOSIS — Z34.90 SECOND PREGNANCY: ICD-10-CM

## 2019-07-24 PROCEDURE — 99213 OFFICE O/P EST LOW 20 MIN: CPT | Performed by: OBSTETRICS & GYNECOLOGY

## 2019-07-24 PROCEDURE — G8417 CALC BMI ABV UP PARAM F/U: HCPCS | Performed by: OBSTETRICS & GYNECOLOGY

## 2019-07-24 PROCEDURE — G8427 DOCREV CUR MEDS BY ELIG CLIN: HCPCS | Performed by: OBSTETRICS & GYNECOLOGY

## 2019-07-24 PROCEDURE — 1036F TOBACCO NON-USER: CPT | Performed by: OBSTETRICS & GYNECOLOGY

## 2019-07-24 NOTE — PROGRESS NOTES
Homar De Paz is a 34 y.o. female 37w5d        OB History    Para Term  AB Living   2 1 1 0 0 1   SAB TAB Ectopic Molar Multiple Live Births           0 1      # Outcome Date GA Lbr Kevin/2nd Weight Sex Delivery Anes PTL Lv   2 Current            1 Term 2014 38w1d  6 lb 14 oz (3.118 kg) F Vag-Spont   JOAN       Vitals  BP: 104/66  Weight: 166 lb (75.3 kg)  Pulse: 97  Patient Position: Sitting  Albumin: Negative  Glucose: Negative      The patient was seen and evaluated. There was positive fetal movements. No contractions or leakage of fluid. Signs and symptoms of labor were reviewed. The S/S of Pre-Eclampsia were reviewed with the patient in detail. She is to report any of these if they occur. She currently denies any of these. The patient was instructed on fetal kick counts and was given a kick sheet to complete every 8 hours. She was instructed that the baby should move at a minimum of ten times within one hour after a meal. The patient was instructed to lay down on her left side twenty minutes after eating and count movements for up to one hour with a target value of ten movements. She was instructed to notify the office if she did not make that target after two attempts or if after any attempt there was less than four movements. The patient reports that the targets have been made Yes.    5/10/19 pt declined tdap  19 pt DECLINED flu vaccine      T-Dap Vaccine Completed (27-36 weeks): No    Allergies: Allergies as of 2019    (No Known Allergies)         Group Beta Strep collection was completed.  Yes  GBS Results:   Admission on 2019, Discharged on 2019   Component Date Value Ref Range Status    Color, UA 2019 YELLOW  YELLOW Final    Turbidity UA 2019 TURBID* CLEAR Final    Glucose, Ur 2019 NEGATIVE  NEGATIVE Final    Bilirubin Urine 2019 NEGATIVE  NEGATIVE Final    Ketones, Urine 2019 NEGATIVE  NEGATIVE Final    Specific Gravity, UA 07/22/2019 1.020  1.000 - 1.030 Final    Urine Hgb 07/22/2019 NEGATIVE  NEGATIVE Final    pH, UA 07/22/2019 6.0  5.0 - 8.0 Final    Protein, UA 07/22/2019 NEGATIVE  NEGATIVE Final    Urobilinogen, Urine 07/22/2019 ELEVATED* Normal Final    Nitrite, Urine 07/22/2019 NEGATIVE  NEGATIVE Final    Leukocyte Esterase, Urine 07/22/2019 LARGE* NEGATIVE Final    Urinalysis Comments 07/22/2019 NOT REPORTED   Final    WBC 07/22/2019 10.9  3.5 - 11.0 k/uL Final    RBC 07/22/2019 3.55* 4.0 - 5.2 m/uL Final    Hemoglobin 07/22/2019 10.5* 12.0 - 16.0 g/dL Final    Hematocrit 07/22/2019 30.9* 36 - 46 % Final    MCV 07/22/2019 87.0  80 - 100 fL Final    MCH 07/22/2019 29.5  26 - 34 pg Final    MCHC 07/22/2019 33.9  31 - 37 g/dL Final    RDW 07/22/2019 13.3  11.5 - 14.9 % Final    Platelets 84/72/9641 335  150 - 450 k/uL Final    MPV 07/22/2019 7.2  6.0 - 12.0 fL Final    NRBC Automated 07/22/2019 NOT REPORTED  per 100 WBC Final    Differential Type 07/22/2019 NOT REPORTED   Final    Seg Neutrophils 07/22/2019 70* 36 - 66 % Final    Lymphocytes 07/22/2019 20* 24 - 44 % Final    Monocytes 07/22/2019 9* 1 - 7 % Final    Eosinophils % 07/22/2019 1  0 - 4 % Final    Basophils 07/22/2019 0  0 - 2 % Final    Immature Granulocytes 07/22/2019 NOT REPORTED  0 % Final    Segs Absolute 07/22/2019 7.60  1.3 - 9.1 k/uL Final    Absolute Lymph # 07/22/2019 2.20  1.0 - 4.8 k/uL Final    Absolute Mono # 07/22/2019 1.00  0.1 - 1.3 k/uL Final    Absolute Eos # 07/22/2019 0.10  0.0 - 0.4 k/uL Final    Basophils # 07/22/2019 0.00  0.0 - 0.2 k/uL Final    Absolute Immature Granulocyte 07/22/2019 NOT REPORTED  0.00 - 0.30 k/uL Final    WBC Morphology 07/22/2019 NOT REPORTED   Final    RBC Morphology 07/22/2019 NOT REPORTED   Final    Platelet Estimate 05/53/0853 NOT REPORTED   Final    TSH 07/22/2019 4.07  0.30 - 5.00 mIU/L Final    - 07/22/2019        Final    WBC, UA 07/22/2019 10 TO 20  /HPF

## 2019-07-30 ENCOUNTER — ROUTINE PRENATAL (OUTPATIENT)
Dept: OBGYN CLINIC | Age: 29
End: 2019-07-30
Payer: COMMERCIAL

## 2019-07-30 ENCOUNTER — HOSPITAL ENCOUNTER (OUTPATIENT)
Age: 29
Discharge: HOME | DRG: 560 | End: 2019-07-31
Attending: OBSTETRICS & GYNECOLOGY | Admitting: OBSTETRICS & GYNECOLOGY
Payer: COMMERCIAL

## 2019-07-30 VITALS
HEART RATE: 76 BPM | SYSTOLIC BLOOD PRESSURE: 112 MMHG | BODY MASS INDEX: 31.83 KG/M2 | WEIGHT: 163 LBS | DIASTOLIC BLOOD PRESSURE: 72 MMHG

## 2019-07-30 DIAGNOSIS — Z3A.38 38 WEEKS GESTATION OF PREGNANCY: ICD-10-CM

## 2019-07-30 DIAGNOSIS — Z34.90 SECOND PREGNANCY: ICD-10-CM

## 2019-07-30 DIAGNOSIS — E03.8 TSH DEFICIENCY: ICD-10-CM

## 2019-07-30 DIAGNOSIS — O09.93 HIGH-RISK PREGNANCY IN THIRD TRIMESTER: Primary | ICD-10-CM

## 2019-07-30 DIAGNOSIS — O99.810 ABNORMAL GLUCOSE TOLERANCE TEST (GTT) DURING PREGNANCY, ANTEPARTUM: ICD-10-CM

## 2019-07-30 LAB
-: ABNORMAL
AMORPHOUS: ABNORMAL
BACTERIA: ABNORMAL
BILIRUBIN URINE: ABNORMAL
CASTS UA: ABNORMAL /LPF
COLOR: YELLOW
COMMENT UA: ABNORMAL
CRYSTALS, UA: ABNORMAL /HPF
EPITHELIAL CELLS UA: ABNORMAL /HPF
GLUCOSE URINE: NEGATIVE
KETONES, URINE: ABNORMAL
LEUKOCYTE ESTERASE, URINE: ABNORMAL
MUCUS: ABNORMAL
NITRITE, URINE: NEGATIVE
OTHER OBSERVATIONS UA: ABNORMAL
PH UA: 5.5 (ref 5–8)
PROTEIN UA: NEGATIVE
RBC UA: ABNORMAL /HPF
RENAL EPITHELIAL, UA: ABNORMAL /HPF
SPECIFIC GRAVITY UA: 1.03 (ref 1–1.03)
TRICHOMONAS: ABNORMAL
TURBIDITY: ABNORMAL
URINE HGB: NEGATIVE
UROBILINOGEN, URINE: NORMAL
WBC UA: ABNORMAL /HPF
YEAST: ABNORMAL

## 2019-07-30 PROCEDURE — 1036F TOBACCO NON-USER: CPT | Performed by: OBSTETRICS & GYNECOLOGY

## 2019-07-30 PROCEDURE — 6370000000 HC RX 637 (ALT 250 FOR IP): Performed by: STUDENT IN AN ORGANIZED HEALTH CARE EDUCATION/TRAINING PROGRAM

## 2019-07-30 PROCEDURE — 99213 OFFICE O/P EST LOW 20 MIN: CPT | Performed by: OBSTETRICS & GYNECOLOGY

## 2019-07-30 PROCEDURE — 87186 SC STD MICRODIL/AGAR DIL: CPT

## 2019-07-30 PROCEDURE — G8417 CALC BMI ABV UP PARAM F/U: HCPCS | Performed by: OBSTETRICS & GYNECOLOGY

## 2019-07-30 PROCEDURE — 2580000003 HC RX 258: Performed by: STUDENT IN AN ORGANIZED HEALTH CARE EDUCATION/TRAINING PROGRAM

## 2019-07-30 PROCEDURE — 87086 URINE CULTURE/COLONY COUNT: CPT

## 2019-07-30 PROCEDURE — 81001 URINALYSIS AUTO W/SCOPE: CPT

## 2019-07-30 PROCEDURE — 87088 URINE BACTERIA CULTURE: CPT

## 2019-07-30 PROCEDURE — G8427 DOCREV CUR MEDS BY ELIG CLIN: HCPCS | Performed by: OBSTETRICS & GYNECOLOGY

## 2019-07-30 PROCEDURE — 87077 CULTURE AEROBIC IDENTIFY: CPT

## 2019-07-30 RX ORDER — PROMETHAZINE HYDROCHLORIDE 25 MG/ML
25 INJECTION, SOLUTION INTRAMUSCULAR; INTRAVENOUS ONCE
Status: DISCONTINUED | OUTPATIENT
Start: 2019-07-30 | End: 2019-07-31 | Stop reason: HOSPADM

## 2019-07-30 RX ORDER — SODIUM CHLORIDE, SODIUM LACTATE, POTASSIUM CHLORIDE, CALCIUM CHLORIDE 600; 310; 30; 20 MG/100ML; MG/100ML; MG/100ML; MG/100ML
125 INJECTION, SOLUTION INTRAVENOUS CONTINUOUS
Status: DISCONTINUED | OUTPATIENT
Start: 2019-07-30 | End: 2019-07-31 | Stop reason: HOSPADM

## 2019-07-30 RX ORDER — MORPHINE SULFATE 10 MG/ML
10 INJECTION, SOLUTION INTRAMUSCULAR; INTRAVENOUS ONCE
Status: DISCONTINUED | OUTPATIENT
Start: 2019-07-30 | End: 2019-07-31 | Stop reason: HOSPADM

## 2019-07-30 RX ORDER — ACETAMINOPHEN 500 MG
1000 TABLET ORAL EVERY 6 HOURS PRN
Status: DISCONTINUED | OUTPATIENT
Start: 2019-07-30 | End: 2019-07-31 | Stop reason: HOSPADM

## 2019-07-30 RX ORDER — ACETAMINOPHEN 500 MG
1000 TABLET ORAL EVERY 6 HOURS PRN
Status: DISCONTINUED | OUTPATIENT
Start: 2019-07-30 | End: 2019-07-30

## 2019-07-30 RX ORDER — SODIUM CHLORIDE, SODIUM LACTATE, POTASSIUM CHLORIDE, AND CALCIUM CHLORIDE .6; .31; .03; .02 G/100ML; G/100ML; G/100ML; G/100ML
1000 INJECTION, SOLUTION INTRAVENOUS ONCE
Status: COMPLETED | OUTPATIENT
Start: 2019-07-30 | End: 2019-07-30

## 2019-07-30 RX ADMIN — ACETAMINOPHEN 1000 MG: 500 TABLET, FILM COATED ORAL at 21:39

## 2019-07-30 RX ADMIN — SODIUM CHLORIDE, POTASSIUM CHLORIDE, SODIUM LACTATE AND CALCIUM CHLORIDE 125 ML/HR: 600; 310; 30; 20 INJECTION, SOLUTION INTRAVENOUS at 23:50

## 2019-07-30 RX ADMIN — SODIUM CHLORIDE, POTASSIUM CHLORIDE, SODIUM LACTATE AND CALCIUM CHLORIDE 1000 ML: 600; 310; 30; 20 INJECTION, SOLUTION INTRAVENOUS at 22:09

## 2019-07-30 ASSESSMENT — PAIN DESCRIPTION - DESCRIPTORS: DESCRIPTORS: CRAMPING

## 2019-07-30 ASSESSMENT — PAIN SCALES - GENERAL: PAINLEVEL_OUTOF10: 7

## 2019-07-30 NOTE — PROGRESS NOTES
doing this already, your doctor may aid this process by giving you medicine, which may be a:   Gel that is applied to the cervix   Suppository put in the vagina   Pill taken by mouth   The cervical ripening process can last for up to a few days. There are also procedures that your doctor may try to aid cervical ripening, such as:   Strip the membranes (separate your cervix from the tissues around the baby's head)   Expand a small balloon-tipped catheter in the uterus   Place small cylinders that contain a type of sponge-like seaweed into the uterus     Changes in the Cervix During Pregnancy       © 2011 [address] [place] [address].   Contractions   If contractions have not started once your cervix is ripe, your doctor will give you a drug that causes contractions. The drug is a man-made version of a hormone called oxytocin. This hormone is produced by your body during active labor. The drug will be adjusted during labor to strengthen or weaken the contractions. Once contractions begin, the labor and birth process will be the same as a spontaneous delivery. Anesthesia   The same pain medicines are available for an induced labor as for a spontaneous delivery, including:   Pain medicine given into your vein   Epidural block   Spinal block   Local anesthesia   Immediately After Procedure   If everything goes well, after the induction you will vaginally deliver a healthy baby. How Long Will It Take? It can be hours to several days (very rarely) from the time you are induced until the delivery. If your cervix is not ripe when you are scheduled for the induction, labor and delivery could take 2-3 days. It could take longer for first-time mothers and for pre-term babies. How Much Will It Hurt? Labor causes severe pain. Talk to your doctor about ways to manage the pain. [address] Stay   The usual length of stay is 1-3 days. Your doctor may choose to keep you longer if you have any problems.

## 2019-07-31 VITALS
RESPIRATION RATE: 14 BRPM | DIASTOLIC BLOOD PRESSURE: 53 MMHG | BODY MASS INDEX: 32 KG/M2 | WEIGHT: 163 LBS | TEMPERATURE: 98.2 F | HEIGHT: 60 IN | HEART RATE: 67 BPM | SYSTOLIC BLOOD PRESSURE: 95 MMHG

## 2019-07-31 PROCEDURE — 99213 OFFICE O/P EST LOW 20 MIN: CPT

## 2019-07-31 PROCEDURE — 96360 HYDRATION IV INFUSION INIT: CPT

## 2019-07-31 PROCEDURE — 96361 HYDRATE IV INFUSION ADD-ON: CPT

## 2019-07-31 NOTE — H&P
tenderness    Cervix Check: 1 cm dilated, 60 % effaced, -1 out of 3 station, mid position, medium consistency, Cephalic    Rectal Exam: not indicated       OMM Structural Exam:  Chief Complaint:  Pregnancy    Anterior/ Posterior Spinal Curves: Lumbar Lordosis -  Increased  Scoliosis (Lateral Spinal Curves): None  Assessment Tool:  T= Tenderness, A= Asymmetry, R= Restricted Motion (A=Active, P=Passive), T=Tissue Texture Changes  Region Evaluated : Severity / Specific of Major Somatic Dysfunction  M99.03 Lumbar -  Minor TART - more than BG levels -   Major Correlations with:  Genitourinary  Structural Diagnosis: Increased lumbar lordosis  Treatment Plan: Outpatient       PRENATAL LAB RESULTS:   Blood Type/Rh: O pos  Antibody Screen: negative  Hemoglobin, Hematocrit, Platelets: 29.2 / 43.2 / 418  Rubella: immune  T.  Pallidum, IgG: non-reactive   Hepatitis B Surface Antigen: non-reactive   HIV: non-reactive   Sickle Cell Screen: not done  Gonorrhea: negative  Chlamydia: negative  Urine culture: negative, date: 19     1 hour Glucose Tolerance Test: 148  3 hour Glucose Tolerance Test:  Fastin; 1 hour: 147; 2 hour  109; 3 hour: 78     Group B Strep: negative  Cystic Fibrosis Screen: not available  First Trimester Screen: not available  MSAFP/Multiple Markers: normal  Non-Invasive Prenatal Testing: not available  Anatomy US: fundal placenta, female, 3VC, normal insertion     ASSESSMENT & PLAN:  Marie Chi is a 34 y.o. female  at 38w4d IUP   - GBS negative / Rh positive / R immune   - No indication for GBS prophylaxis    Contractions    - Afebrile, VSS    - cEFM/toco, contractions every 4-6 min   - UA collected and sent    - SVE unchanged from the office /-1    - PO hydration and Tylenol provided    - Repeat SVE in 2 hours     Elevated one hour GTT              - Normal 3 hour GTT     Remote hx of Chlamydia              -               - GC/C negative in this pregnancy    Patient Active Problem List    Diagnosis Date Noted    TSH deficiency 01/16/2019     Priority: High     1/16/2019 repeat with second trimester labwork      Second pregnancy 01/15/2019     Priority: High    Mood disorder ([address] 04/22/2016     Priority: High    High risk pregnancy, antepartum 07/22/2019    Elevated 1 hour GTT, normal 3 hour 05/13/2019 5/13/2019 3 hour GTT and hgb a1c ordered  3 hour GTT WNL      HRP (high risk pregnancy), second trimester 05/08/2019    Cannabis abuse 04/22/2016       Plan discussed with Dr. Marlena Bonilla, who is agreeable. Steroids given this admission: No    Risks, benefits, alternatives and possible complications have been discussed in detail with the patient. Admission, and post admission procedures and expectations were discussed in detail. All questions were answered.     Attending's Name: Dr. Asa Tucker DO  Ob/Gyn Resident  7/30/2019, 9:32 PM

## 2019-08-01 ENCOUNTER — HOSPITAL ENCOUNTER (INPATIENT)
Age: 29
LOS: 2 days | Discharge: HOME | DRG: 560 | End: 2019-08-03
Attending: OBSTETRICS & GYNECOLOGY | Admitting: OBSTETRICS & GYNECOLOGY
Payer: COMMERCIAL

## 2019-08-01 ENCOUNTER — APPOINTMENT (OUTPATIENT)
Dept: LABOR AND DELIVERY | Age: 29
DRG: 560 | End: 2019-08-01
Payer: COMMERCIAL

## 2019-08-01 DIAGNOSIS — O99.810 ABNORMAL GLUCOSE TOLERANCE TEST (GTT) DURING PREGNANCY, ANTEPARTUM: ICD-10-CM

## 2019-08-01 DIAGNOSIS — Z34.90 SECOND PREGNANCY: ICD-10-CM

## 2019-08-01 DIAGNOSIS — E03.8 TSH DEFICIENCY: ICD-10-CM

## 2019-08-01 PROBLEM — O09.90 HRP (HIGH RISK PREGNANCY), UNSPECIFIED TRIMESTER: Status: RESOLVED | Noted: 2019-08-01 | Resolved: 2019-08-01

## 2019-08-01 PROBLEM — O09.90 HIGH RISK PREGNANCY, ANTEPARTUM: Status: RESOLVED | Noted: 2019-07-22 | Resolved: 2019-08-01

## 2019-08-01 PROBLEM — O09.90 HRP (HIGH RISK PREGNANCY), UNSPECIFIED TRIMESTER: Status: ACTIVE | Noted: 2019-08-01

## 2019-08-01 LAB
-: ABNORMAL
ABO/RH: NORMAL
ABSOLUTE EOS #: 0 K/UL (ref 0–0.4)
ABSOLUTE IMMATURE GRANULOCYTE: ABNORMAL K/UL (ref 0–0.3)
ABSOLUTE LYMPH #: 1.8 K/UL (ref 1–4.8)
ABSOLUTE MONO #: 0.9 K/UL (ref 0.1–1.3)
AMORPHOUS: ABNORMAL
AMPHETAMINE SCREEN URINE: NEGATIVE
ANTIBODY SCREEN: NEGATIVE
ARM BAND NUMBER: NORMAL
BACTERIA: ABNORMAL
BARBITURATE SCREEN URINE: NEGATIVE
BASOPHILS # BLD: 0 % (ref 0–2)
BASOPHILS ABSOLUTE: 0 K/UL (ref 0–0.2)
BENZODIAZEPINE SCREEN, URINE: NEGATIVE
BILIRUBIN URINE: NEGATIVE
BUPRENORPHINE URINE: NORMAL
CANNABINOID SCREEN URINE: NEGATIVE
CASTS UA: ABNORMAL /LPF
COCAINE METABOLITE, URINE: NEGATIVE
COLOR: YELLOW
COMMENT UA: ABNORMAL
CRYSTALS, UA: ABNORMAL /HPF
CULTURE: ABNORMAL
CULTURE: ABNORMAL
DIFFERENTIAL TYPE: ABNORMAL
EOSINOPHILS RELATIVE PERCENT: 0 % (ref 0–4)
EPITHELIAL CELLS UA: ABNORMAL /HPF
EXPIRATION DATE: NORMAL
GLUCOSE URINE: NEGATIVE
HCT VFR BLD CALC: 34.8 % (ref 36–46)
HEMOGLOBIN: 11.7 G/DL (ref 12–16)
IMMATURE GRANULOCYTES: ABNORMAL %
KETONES, URINE: NEGATIVE
LEUKOCYTE ESTERASE, URINE: ABNORMAL
LYMPHOCYTES # BLD: 16 % (ref 24–44)
Lab: ABNORMAL
MCH RBC QN AUTO: 29.4 PG (ref 26–34)
MCHC RBC AUTO-ENTMCNC: 33.8 G/DL (ref 31–37)
MCV RBC AUTO: 87 FL (ref 80–100)
MDMA URINE: NORMAL
METHADONE SCREEN, URINE: NEGATIVE
METHAMPHETAMINE, URINE: NORMAL
MONOCYTES # BLD: 8 % (ref 1–7)
MUCUS: ABNORMAL
NITRITE, URINE: NEGATIVE
NRBC AUTOMATED: ABNORMAL PER 100 WBC
OPIATES, URINE: NEGATIVE
OTHER OBSERVATIONS UA: ABNORMAL
OXYCODONE SCREEN URINE: NEGATIVE
PDW BLD-RTO: 13.6 % (ref 11.5–14.9)
PH UA: 6.5 (ref 5–8)
PHENCYCLIDINE, URINE: NEGATIVE
PLATELET # BLD: 351 K/UL (ref 150–450)
PLATELET ESTIMATE: ABNORMAL
PMV BLD AUTO: 7.3 FL (ref 6–12)
PROPOXYPHENE, URINE: NORMAL
PROTEIN UA: NEGATIVE
RBC # BLD: 4 M/UL (ref 4–5.2)
RBC # BLD: ABNORMAL 10*6/UL
RBC UA: ABNORMAL /HPF
RENAL EPITHELIAL, UA: ABNORMAL /HPF
SEG NEUTROPHILS: 76 % (ref 36–66)
SEGMENTED NEUTROPHILS ABSOLUTE COUNT: 8.4 K/UL (ref 1.3–9.1)
SPECIFIC GRAVITY UA: 1.02 (ref 1–1.03)
SPECIMEN DESCRIPTION: ABNORMAL
T. PALLIDUM, IGG: NONREACTIVE
TEST INFORMATION: NORMAL
TRICHOMONAS: ABNORMAL
TRICYCLIC ANTIDEPRESSANTS, UR: NORMAL
TURBIDITY: ABNORMAL
URINE HGB: ABNORMAL
UROBILINOGEN, URINE: NORMAL
WBC # BLD: 11.1 K/UL (ref 3.5–11)
WBC # BLD: ABNORMAL 10*3/UL
WBC UA: ABNORMAL /HPF
YEAST: ABNORMAL

## 2019-08-01 PROCEDURE — 86850 RBC ANTIBODY SCREEN: CPT

## 2019-08-01 PROCEDURE — 81001 URINALYSIS AUTO W/SCOPE: CPT

## 2019-08-01 PROCEDURE — 1220000000 HC SEMI PRIVATE OB R&B

## 2019-08-01 PROCEDURE — 6360000002 HC RX W HCPCS: Performed by: STUDENT IN AN ORGANIZED HEALTH CARE EDUCATION/TRAINING PROGRAM

## 2019-08-01 PROCEDURE — 88307 TISSUE EXAM BY PATHOLOGIST: CPT

## 2019-08-01 PROCEDURE — 87086 URINE CULTURE/COLONY COUNT: CPT

## 2019-08-01 PROCEDURE — 6360000002 HC RX W HCPCS: Performed by: OBSTETRICS & GYNECOLOGY

## 2019-08-01 PROCEDURE — 85025 COMPLETE CBC W/AUTO DIFF WBC: CPT

## 2019-08-01 PROCEDURE — 2580000003 HC RX 258: Performed by: STUDENT IN AN ORGANIZED HEALTH CARE EDUCATION/TRAINING PROGRAM

## 2019-08-01 PROCEDURE — 6370000000 HC RX 637 (ALT 250 FOR IP): Performed by: STUDENT IN AN ORGANIZED HEALTH CARE EDUCATION/TRAINING PROGRAM

## 2019-08-01 PROCEDURE — 36415 COLL VENOUS BLD VENIPUNCTURE: CPT

## 2019-08-01 PROCEDURE — 86780 TREPONEMA PALLIDUM: CPT

## 2019-08-01 PROCEDURE — 3E033VJ INTRODUCTION OF OTHER HORMONE INTO PERIPHERAL VEIN, PERCUTANEOUS APPROACH: ICD-10-PCS | Performed by: OBSTETRICS & GYNECOLOGY

## 2019-08-01 PROCEDURE — 10907ZC DRAINAGE OF AMNIOTIC FLUID, THERAPEUTIC FROM PRODUCTS OF CONCEPTION, VIA NATURAL OR ARTIFICIAL OPENING: ICD-10-PCS | Performed by: OBSTETRICS & GYNECOLOGY

## 2019-08-01 PROCEDURE — 86900 BLOOD TYPING SEROLOGIC ABO: CPT

## 2019-08-01 PROCEDURE — 76815 OB US LIMITED FETUS(S): CPT

## 2019-08-01 PROCEDURE — 7200000001 HC VAGINAL DELIVERY

## 2019-08-01 PROCEDURE — 86901 BLOOD TYPING SEROLOGIC RH(D): CPT

## 2019-08-01 PROCEDURE — 59409 OBSTETRICAL CARE: CPT | Performed by: OBSTETRICS & GYNECOLOGY

## 2019-08-01 PROCEDURE — 80307 DRUG TEST PRSMV CHEM ANLYZR: CPT

## 2019-08-01 RX ORDER — IBUPROFEN 800 MG/1
800 TABLET ORAL EVERY 8 HOURS PRN
Qty: 30 TABLET | Refills: 0 | Status: SHIPPED | OUTPATIENT
Start: 2019-08-01 | End: 2019-08-01 | Stop reason: HOSPADM

## 2019-08-01 RX ORDER — LANOLIN 100 %
OINTMENT (GRAM) TOPICAL PRN
Status: DISCONTINUED | OUTPATIENT
Start: 2019-08-01 | End: 2019-08-03 | Stop reason: HOSPADM

## 2019-08-01 RX ORDER — SODIUM CHLORIDE, SODIUM LACTATE, POTASSIUM CHLORIDE, CALCIUM CHLORIDE 600; 310; 30; 20 MG/100ML; MG/100ML; MG/100ML; MG/100ML
INJECTION, SOLUTION INTRAVENOUS CONTINUOUS
Status: DISCONTINUED | OUTPATIENT
Start: 2019-08-01 | End: 2019-08-01

## 2019-08-01 RX ORDER — SODIUM CHLORIDE 0.9 % (FLUSH) 0.9 %
10 SYRINGE (ML) INJECTION PRN
Status: DISCONTINUED | OUTPATIENT
Start: 2019-08-01 | End: 2019-08-01

## 2019-08-01 RX ORDER — SIMETHICONE 80 MG
80 TABLET,CHEWABLE ORAL EVERY 6 HOURS PRN
Status: DISCONTINUED | OUTPATIENT
Start: 2019-08-01 | End: 2019-08-03 | Stop reason: HOSPADM

## 2019-08-01 RX ORDER — NALBUPHINE HCL 10 MG/ML
10 AMPUL (ML) INJECTION ONCE
Status: COMPLETED | OUTPATIENT
Start: 2019-08-01 | End: 2019-08-01

## 2019-08-01 RX ORDER — DOCUSATE SODIUM 100 MG/1
100 CAPSULE, LIQUID FILLED ORAL 2 TIMES DAILY
Qty: 60 CAPSULE | Refills: 0 | Status: SHIPPED | OUTPATIENT
Start: 2019-08-01 | End: 2019-08-31

## 2019-08-01 RX ORDER — NALBUPHINE HCL 10 MG/ML
10 AMPUL (ML) INJECTION
Status: COMPLETED | OUTPATIENT
Start: 2019-08-01 | End: 2019-08-01

## 2019-08-01 RX ORDER — ACETAMINOPHEN 500 MG
1000 TABLET ORAL EVERY 6 HOURS PRN
Status: DISCONTINUED | OUTPATIENT
Start: 2019-08-01 | End: 2019-08-03 | Stop reason: HOSPADM

## 2019-08-01 RX ORDER — ACETAMINOPHEN 500 MG
1000 TABLET ORAL EVERY 6 HOURS PRN
Status: DISCONTINUED | OUTPATIENT
Start: 2019-08-01 | End: 2019-08-01

## 2019-08-01 RX ORDER — ONDANSETRON 4 MG/1
4 TABLET, FILM COATED ORAL EVERY 4 HOURS PRN
Status: DISCONTINUED | OUTPATIENT
Start: 2019-08-01 | End: 2019-08-03 | Stop reason: HOSPADM

## 2019-08-01 RX ORDER — SODIUM CHLORIDE 0.9 % (FLUSH) 0.9 %
10 SYRINGE (ML) INJECTION EVERY 12 HOURS SCHEDULED
Status: DISCONTINUED | OUTPATIENT
Start: 2019-08-01 | End: 2019-08-01

## 2019-08-01 RX ORDER — ONDANSETRON 2 MG/ML
4 INJECTION INTRAMUSCULAR; INTRAVENOUS EVERY 6 HOURS PRN
Status: DISCONTINUED | OUTPATIENT
Start: 2019-08-01 | End: 2019-08-01

## 2019-08-01 RX ORDER — IBUPROFEN 800 MG/1
800 TABLET ORAL EVERY 8 HOURS PRN
Status: DISCONTINUED | OUTPATIENT
Start: 2019-08-01 | End: 2019-08-03 | Stop reason: HOSPADM

## 2019-08-01 RX ORDER — DOCUSATE SODIUM 100 MG/1
100 CAPSULE, LIQUID FILLED ORAL 2 TIMES DAILY
Status: DISCONTINUED | OUTPATIENT
Start: 2019-08-01 | End: 2019-08-03 | Stop reason: HOSPADM

## 2019-08-01 RX ORDER — NAPROXEN 500 MG/1
500 TABLET ORAL 2 TIMES DAILY WITH MEALS
Qty: 30 TABLET | Refills: 0 | Status: SHIPPED | OUTPATIENT
Start: 2019-08-01 | End: 2019-11-18

## 2019-08-01 RX ADMIN — SODIUM CHLORIDE, POTASSIUM CHLORIDE, SODIUM LACTATE AND CALCIUM CHLORIDE: 600; 310; 30; 20 INJECTION, SOLUTION INTRAVENOUS at 11:17

## 2019-08-01 RX ADMIN — SODIUM CHLORIDE, POTASSIUM CHLORIDE, SODIUM LACTATE AND CALCIUM CHLORIDE: 600; 310; 30; 20 INJECTION, SOLUTION INTRAVENOUS at 06:00

## 2019-08-01 RX ADMIN — Medication 1 MILLI-UNITS/MIN: at 08:02

## 2019-08-01 RX ADMIN — ACETAMINOPHEN 1000 MG: 500 TABLET, FILM COATED ORAL at 13:08

## 2019-08-01 RX ADMIN — NALBUPHINE HYDROCHLORIDE 10 MG: 10 INJECTION, SOLUTION INTRAMUSCULAR; INTRAVENOUS; SUBCUTANEOUS at 07:26

## 2019-08-01 RX ADMIN — DOCUSATE SODIUM 100 MG: 100 CAPSULE, LIQUID FILLED ORAL at 20:25

## 2019-08-01 RX ADMIN — Medication 50 MILLI-UNITS/MIN: at 11:44

## 2019-08-01 RX ADMIN — IBUPROFEN 800 MG: 800 TABLET ORAL at 12:08

## 2019-08-01 RX ADMIN — NALBUPHINE HYDROCHLORIDE 10 MG: 10 INJECTION, SOLUTION INTRAMUSCULAR; INTRAVENOUS; SUBCUTANEOUS at 09:50

## 2019-08-01 RX ADMIN — ACETAMINOPHEN 1000 MG: 500 TABLET, FILM COATED ORAL at 19:33

## 2019-08-01 RX ADMIN — IBUPROFEN 800 MG: 800 TABLET ORAL at 20:25

## 2019-08-01 ASSESSMENT — PAIN DESCRIPTION - LOCATION
LOCATION: ABDOMEN
LOCATION: PERINEUM

## 2019-08-01 ASSESSMENT — PAIN DESCRIPTION - DESCRIPTORS
DESCRIPTORS: ACHING
DESCRIPTORS: CRAMPING

## 2019-08-01 ASSESSMENT — PAIN DESCRIPTION - PAIN TYPE: TYPE: ACUTE PAIN

## 2019-08-01 ASSESSMENT — PAIN SCALES - GENERAL
PAINLEVEL_OUTOF10: 10
PAINLEVEL_OUTOF10: 5
PAINLEVEL_OUTOF10: 6
PAINLEVEL_OUTOF10: 5
PAINLEVEL_OUTOF10: 3
PAINLEVEL_OUTOF10: 6

## 2019-08-01 ASSESSMENT — PAIN DESCRIPTION - PROGRESSION: CLINICAL_PROGRESSION: GRADUALLY WORSENING

## 2019-08-01 ASSESSMENT — PAIN DESCRIPTION - FREQUENCY: FREQUENCY: INTERMITTENT

## 2019-08-01 NOTE — L&D DELIVERY NOTE
Mother's Information    Labor Events     labor?:  No  Rupture type:  Artificial=AROM  Fluid color:  Meconium     Mother Delivery Information    Episiotomy:  None  Lacerations:  None  Repair Suture:  None  Vaginal Delivery Est. Blood Loss (mL):  200  Surgical or Additional Est. Blood Loss (mL):  0 (View Only):  Edit in Flowsheets   Combined Est. Blood Loss (mL):  200        Raymon Diehlin Pending [place] [[id number]]    Labor Events     labor?:  No   steroids?:  None  Cervical ripening date/time:     Antibiotics received during labor?:  No  Rupture date/time: 19 09:26:00   Rupture type:  Artificial=AROM  Fluid color:  Meconium  Meconium consistency:   Thin  Fluid odor:  None  Augmentation:  Oxytocin  Indications for augmentation:  Ineffective Contraction Pattern  Labor complications:  Meconium at birth          Anesthesia    Method:  None     Assisted Delivery Details    Forceps attempted?:  No  Vacuum extractor attempted?:  No     Document Additional Attempt       Document Additional Attempt             Shoulder Dystocia    Shoulder dystocia present?:  No  Add Second Maneuver  Add Third Maneuver  Add Fourth Maneuver  Add Fifth Maneuver  Add Sixth Maneuver  Add Seventh Maneuver  Add Eighth Maneuver  Add Ninth Maneuver      Presentation    Presentation:  Vertex  Position:  Right  _:  Occiput  _:  Anterior      Information    Head delivery date/time:  2019 11:39:00   Changing the 's delivery date/time could affect patient care.:     Delivery date/time:   19 1139   Delivery type:  Vaginal, Spontaneous    Details:         Delivery Providers    Delivering clinician:  Lucie Xiao DO   Provider Role    Lucie Xiao DO Obstetrician    Durga Chowdhury Resident    Camille Okeefe, ION Delivery Nurse    Dotty Portillo, RN Registered Nurse    Claudia Cagle Surgical Tech    Jose Del Real RCP Respiratory Therapist (Day)      Cord    Vessels:

## 2019-08-01 NOTE — H&P
VITAMINS) 28-0.8 MG TABS Take 1 tablet by mouth daily 12/13/18 4/11/19  Cheko Avendaño, APRN - CNM       FAMILY HISTORY:  family history includes Alcohol Abuse in her mother; Depression in her mother; Diabetes in an other family member; Hypertension in her mother and another family member. SOCIAL HISTORY:   reports that she has never smoked. She has never used smokeless tobacco. She reports that she has current or past drug history. Drug: Marijuana. She reports that she does not drink alcohol.     VITALS:  Vitals:    08/01/19 0515 08/01/19 0520 08/01/19 0525 08/01/19 0530   BP:       Pulse:       Resp:       Temp:       TempSrc:       SpO2: 95% 99% 97% 98%   Weight:       Height:           PHYSICAL EXAM:  Fetal Heart Monitor:  Baseline Heart Rate 130, moderate variability, present accelerations, absent decelerations  [place]: contractions, regular, every 3-6 minutes    General appearance:  moderate distress with contractons, alert and cooperative  HEENT: head atraumatic, normocephalic, moist mucous membranes, trachea midline  Neurologic:  alert, oriented, normal speech, no focal findings or movement disorder noted  Lungs:  No increased work of breathing, good air exchange, clear to auscultation bilaterally, no crackles or wheezing  Heart:  regular rate and rhythm and no murmur, rubs, gallops  Abdomen:  soft, gravid, non-tender, no right upper quadrant tenderness, no CVA tenderness, uterus non-tender, no signs of abruption and no signs of chorioamnionitis  Extremities:  no calf tenderness, non edematous, no varicosities, full range of motion in all four extremities, DTRs: normal  Musculoskeletal: Gross strength equal and intact throughout, no gross abnormalities, range of motion normal in hips, knees, shoulders and spine  Psychiatric: Mood appropriate, normal affect     Pelvic Exam:  Vulva: normal appearing vulva, no masses, tenderness or lesions, normal clitoris  Vagina: normal appearing vagina with normal color

## 2019-08-01 NOTE — PROGRESS NOTES
Lymphocytes 08/01/2019 16* 24 - 44 % Final    Monocytes 08/01/2019 8* 1 - 7 % Final    Eosinophils % 08/01/2019 0  0 - 4 % Final    Basophils 08/01/2019 0  0 - 2 % Final    Immature Granulocytes 08/01/2019 NOT REPORTED  0 % Final    Segs Absolute 08/01/2019 8.40  1.3 - 9.1 k/uL Final    Absolute Lymph # 08/01/2019 1.80  1.0 - 4.8 k/uL Final    Absolute Mono # 08/01/2019 0.90  0.1 - 1.3 k/uL Final    Absolute Eos # 08/01/2019 0.00  0.0 - 0.4 k/uL Final    Basophils # 08/01/2019 0.00  0.0 - 0.2 k/uL Final    Absolute Immature Granulocyte 08/01/2019 NOT REPORTED  0.00 - 0.30 k/uL Final    WBC Morphology 08/01/2019 NOT REPORTED   Final    RBC Morphology 08/01/2019 NOT REPORTED   Final    Platelet Estimate 03/44/6465 NOT REPORTED   Final    Expiration Date 08/01/2019 08/04/2019   Final    Arm Band Number 08/01/2019 [id number]   Final    ABO/Rh 08/01/2019 O POSITIVE   Final    Antibody Screen 08/01/2019 NEGATIVE   Final    Color, UA 08/01/2019 YELLOW  YELLOW Final    Turbidity UA 08/01/2019 CLOUDY* CLEAR Final    Glucose, Ur 08/01/2019 NEGATIVE  NEGATIVE Final    Bilirubin Urine 08/01/2019 NEGATIVE  NEGATIVE Final    Ketones, Urine 08/01/2019 NEGATIVE  NEGATIVE Final    Specific [place], UA 08/01/2019 1.021  1.000 - 1.030 Final    Urine Hgb 08/01/2019 SMALL* NEGATIVE Final    pH, UA 08/01/2019 6.5  5.0 - 8.0 Final    Protein, UA 08/01/2019 NEGATIVE  NEGATIVE Final    Urobilinogen, Urine 08/01/2019 Normal  Normal Final    Nitrite, Urine 08/01/2019 NEGATIVE  NEGATIVE Final    Leukocyte Esterase, Urine 08/01/2019 MOD* NEGATIVE Final    Urinalysis Comments 08/01/2019 NOT REPORTED   Final    Amphetamine Screen, Ur 08/01/2019 NEGATIVE  NEGATIVE Final    Comment:       (Positive cutoff 1000 ng/mL)                  Barbiturate Screen, Ur 08/01/2019 NEGATIVE  NEGATIVE Final    Comment:       (Positive cutoff 200 ng/mL)                  Benzodiazepine Screen, Urine 08/01/2019 NEGATIVE  NEGATIVE Final Comment:       (Positive cutoff 200 ng/mL)                  Cocaine Metabolite, Urine 08/01/2019 NEGATIVE  NEGATIVE Final    Comment:       (Positive cutoff 300 ng/mL)                  Methadone Screen, Urine 08/01/2019 NEGATIVE  NEGATIVE Final    Comment:       (Positive cutoff 300 ng/mL)                  Opiates, Urine 08/01/2019 NEGATIVE  NEGATIVE Final    Comment:       (Positive cutoff 300 ng/mL)                  Phencyclidine, Urine 08/01/2019 NEGATIVE  NEGATIVE Final    Comment:       (Positive cutoff 25 ng/mL)                  Propoxyphene, Urine 08/01/2019 NOT REPORTED  NEGATIVE Final    Cannabinoid Scrn, Ur 08/01/2019 NEGATIVE  NEGATIVE Final    Comment:       (Positive cutoff 50 ng/mL)                  Oxycodone Screen, Ur 08/01/2019 NEGATIVE  NEGATIVE Final    Comment:       (Positive cutoff 100 ng/mL)                  Methamphetamine, Urine 08/01/2019 NOT REPORTED  NEGATIVE Final    Tricyclic Antidepressants, Urine 08/01/2019 NOT REPORTED  NEGATIVE Final    MDMA, Urine 08/01/2019 NOT REPORTED  NEGATIVE Final    Buprenorphine Urine 08/01/2019 NOT REPORTED  NEGATIVE Final    Test Information 08/01/2019 Assay provides medical screening only. The absence of expected drug(s) and/or metabolite(s) may indicate diluted or adulterated urine, limitations of testing or timing of collection. Final    Comment: Testing for legal purposes should be confirmed by another method. To request confirmation   of test result, please call the lab within 7 days of sample submission.       - 08/01/2019        Final    WBC, UA 08/01/2019 50   /HPF Final    RBC, UA 08/01/2019 5 TO 10  /HPF Final    Casts UA 08/01/2019 NOT REPORTED  /LPF Final    Crystals UA 08/01/2019 NOT REPORTED  None /HPF Final    Epithelial Cells UA 08/01/2019 50   /HPF Final    Renal Epithelial, Urine 08/01/2019 NOT REPORTED  0 /HPF Final    Bacteria, UA 08/01/2019 MODERATE* None Final    Mucus, UA 08/01/2019 NOT CFU/ML*  Preliminary    Culture 07/30/2019 GROUP D ENTEROCOCCUS 10 to 50,000 CFU/ML*  Preliminary   Admission on 07/22/2019, Discharged on 07/22/2019   Component Date Value Ref Range Status    Color, UA 07/22/2019 YELLOW  YELLOW Final    Turbidity UA 07/22/2019 TURBID* CLEAR Final    Glucose, Ur 07/22/2019 NEGATIVE  NEGATIVE Final    Bilirubin Urine 07/22/2019 NEGATIVE  NEGATIVE Final    Ketones, Urine 07/22/2019 NEGATIVE  NEGATIVE Final    Specific Gravity, UA 07/22/2019 1.020  1.000 - 1.030 Final    Urine Hgb 07/22/2019 NEGATIVE  NEGATIVE Final    pH, UA 07/22/2019 6.0  5.0 - 8.0 Final    Protein, UA 07/22/2019 NEGATIVE  NEGATIVE Final    Urobilinogen, Urine 07/22/2019 ELEVATED* Normal Final    Nitrite, Urine 07/22/2019 NEGATIVE  NEGATIVE Final    Leukocyte Esterase, Urine 07/22/2019 LARGE* NEGATIVE Final    Urinalysis Comments 07/22/2019 NOT REPORTED   Final    WBC 07/22/2019 10.9  3.5 - 11.0 k/uL Final    RBC 07/22/2019 3.55* 4.0 - 5.2 m/uL Final    Hemoglobin 07/22/2019 10.5* 12.0 - 16.0 g/dL Final    Hematocrit 07/22/2019 30.9* 36 - 46 % Final    MCV 07/22/2019 87.0  80 - 100 fL Final    MCH 07/22/2019 29.5  26 - 34 pg Final    MCHC 07/22/2019 33.9  31 - 37 g/dL Final    RDW 07/22/2019 13.3  11.5 - 14.9 % Final    Platelets 69/36/4812 335  150 - 450 k/uL Final    MPV 07/22/2019 7.2  6.0 - 12.0 fL Final    NRBC Automated 07/22/2019 NOT REPORTED  per 100 WBC Final    Differential Type 07/22/2019 NOT REPORTED   Final    Seg Neutrophils 07/22/2019 70* 36 - 66 % Final    Lymphocytes 07/22/2019 20* 24 - 44 % Final    Monocytes 07/22/2019 9* 1 - 7 % Final    Eosinophils % 07/22/2019 1  0 - 4 % Final    Basophils 07/22/2019 0  0 - 2 % Final    Immature Granulocytes 07/22/2019 NOT REPORTED  0 % Final    Segs Absolute 07/22/2019 7.60  1.3 - 9.1 k/uL Final    Absolute Lymph # 07/22/2019 2.20  1.0 - 4.8 k/uL Final    Absolute Mono # 07/22/2019 1.00  0.1 - 1.3 k/uL Final    Absolute Eos

## 2019-08-01 NOTE — DISCHARGE SUMMARY
Obstetric Discharge Summary  [facility]    Patient Name: Cheryl Aguayo  Patient : 1990  Primary Care Physician: Juliano Medina MD  Admit Date: 2019    Principal Diagnosis: IUP at 38w6d, admitted for Spontaneous Labor     Her pregnancy has been complicated by:   Patient Active Problem List   Diagnosis    Normal labor and delivery    Mood disorder ([address]    Cannabis abuse    TSH deficiency    Rh+/RI/GBS neg    Elevated 1 hour GTT, normal 3 hour    Depression     19 F Apg 8/9 Wt 7#10       Infection Present?: No  Hospital Acquired: No    Surgical Operations & Procedures:  [] Pitocin Induction of Labor  [x] Pitocin Augmentation of Labor  [] Prostaglandin Induction of Labor  [] Mechanical Induction of Labor  [x] Artificial Rupture of Membranes  [] Intrauterine Pressure Catheter  [] Fetal Scalp Electrode  [] Amnioinfusion  Analgesia: none  Delivery Type: Spontaneous Vaginal Delivery: See Labor and Delivery Summary   Laceration(s): Absent    Consultations: NICU and Anesthesia    Pertinent Findings & Procedures:   Cheryl Aguayo is a 34 y.o. female  at 38w6d admitted on 19 for Spontaneous Labor; received nubain x 2, pitocin, AROM (thin mec), Nitrous. She delivered by spontaneous vaginal a Live Born infant on 19         Information for the patient's :  Margarito Perks Girl Queta Hernandez [[id number]]   female  Birth Weight: 7 lb 10.8 oz (3.48 kg)      Apgars: 8 at 1 minute and 9 at 5 minutes. Postpartum course: normal.   Hgb on PPD #1 was 10.2.      Course of patient: uncomplicated    Discharge to: Home    Readmission planned: no     Recommendations on Discharge:     Medications:      Medication List      START taking these medications    ferrous sulfate 325 (65 Fe) MG tablet  Take 1 tablet by mouth 2 times daily        CONTINUE taking these medications    docusate sodium 100 MG capsule  Commonly known as:  COLACE  Take 1 capsule by mouth 2 times daily

## 2019-08-02 LAB
CULTURE: NORMAL
HCT VFR BLD CALC: 30.2 % (ref 36–46)
HEMOGLOBIN: 10.2 G/DL (ref 12–16)
Lab: NORMAL
SPECIMEN DESCRIPTION: NORMAL

## 2019-08-02 PROCEDURE — 1220000000 HC SEMI PRIVATE OB R&B

## 2019-08-02 PROCEDURE — 85018 HEMOGLOBIN: CPT

## 2019-08-02 PROCEDURE — 99024 POSTOP FOLLOW-UP VISIT: CPT | Performed by: OBSTETRICS & GYNECOLOGY

## 2019-08-02 PROCEDURE — 36415 COLL VENOUS BLD VENIPUNCTURE: CPT

## 2019-08-02 PROCEDURE — 6370000000 HC RX 637 (ALT 250 FOR IP): Performed by: STUDENT IN AN ORGANIZED HEALTH CARE EDUCATION/TRAINING PROGRAM

## 2019-08-02 PROCEDURE — 85014 HEMATOCRIT: CPT

## 2019-08-02 RX ORDER — FERROUS SULFATE 325(65) MG
325 TABLET ORAL 2 TIMES DAILY
Qty: 60 TABLET | Refills: 0 | Status: SHIPPED | OUTPATIENT
Start: 2019-08-02 | End: 2019-11-18

## 2019-08-02 RX ADMIN — IBUPROFEN 800 MG: 800 TABLET ORAL at 20:24

## 2019-08-02 RX ADMIN — DOCUSATE SODIUM 100 MG: 100 CAPSULE, LIQUID FILLED ORAL at 20:25

## 2019-08-02 RX ADMIN — DOCUSATE SODIUM 100 MG: 100 CAPSULE, LIQUID FILLED ORAL at 10:05

## 2019-08-02 RX ADMIN — IBUPROFEN 800 MG: 800 TABLET ORAL at 05:47

## 2019-08-02 ASSESSMENT — PAIN DESCRIPTION - DESCRIPTORS
DESCRIPTORS: CRAMPING
DESCRIPTORS: CRAMPING

## 2019-08-02 ASSESSMENT — PAIN SCALES - GENERAL
PAINLEVEL_OUTOF10: 6
PAINLEVEL_OUTOF10: 3
PAINLEVEL_OUTOF10: 5
PAINLEVEL_OUTOF10: 4

## 2019-08-02 ASSESSMENT — PAIN DESCRIPTION - PAIN TYPE: TYPE: ACUTE PAIN

## 2019-08-02 ASSESSMENT — PAIN DESCRIPTION - LOCATION: LOCATION: ABDOMEN

## 2019-08-02 NOTE — PLAN OF CARE
Problem: Mood - Altered:  Goal: Mood stable  Description  Mood stable  8/2/2019 0625 by Radha Carlisle RN  Outcome: Met This Shift     Problem: Pain - Acute:  Goal: Pain level will decrease  Description  Pain level will decrease  8/2/2019 0625 by Radha Carlisle RN  Outcome: Met This Shift

## 2019-08-02 NOTE — PROGRESS NOTES
Obstetrical Rounds:    PPD#: [id number] Day: 2  Procedure: normal spontaneous vaginal delivery    Date: 2019  Time: 6:26 AM        Patient Name: Amna Schmitz  Patient : 1990  Room/Bed: 6905/1431-84  Admission Date/Time: 2019  3:14 AM  MRN #: [id number]  [facility] #: [id number]        Attending Physician Statement  I have discussed the care of Amna Schmitz, including pertinent history and exam findings,  with the resident. I have reviewed their note in the electronic medical record. I have seen and examined the patient and the key elements of all parts of the encounter have been performed/reviewed by me . I agree with the assessment, plan and orders as documented by the resident. Pt without c/c. Pt plans on discharge 8/3.  Pt bottle feeding    Vitals:    19 0343   BP: 109/61   Pulse: 59   Resp: 16   Temp: 98.2 °F (36.8 °C)   SpO2:        Admission on 2019   Component Date Value Ref Range Status    WBC 2019 11.1* 3.5 - 11.0 k/uL Final    RBC 2019 4.00  4.0 - 5.2 m/uL Final    Hemoglobin 2019 11.7* 12.0 - 16.0 g/dL Final    Hematocrit 2019 34.8* 36 - 46 % Final    MCV 2019 87.0  80 - 100 fL Final    MCH 2019 29.4  26 - 34 pg Final    MCHC 2019 33.8  31 - 37 g/dL Final    RDW 2019 13.6  11.5 - 14.9 % Final    Platelets  351  150 - 450 k/uL Final    MPV 2019 7.3  6.0 - 12.0 fL Final    NRBC Automated 2019 NOT REPORTED  per 100 WBC Final    Differential Type 2019 NOT REPORTED   Final    Seg Neutrophils 2019 76* 36 - 66 % Final    Lymphocytes 2019 16* 24 - 44 % Final    Monocytes 2019 8* 1 - 7 % Final    Eosinophils % 2019 0  0 - 4 % Final    Basophils 2019 0  0 - 2 % Final    Immature Granulocytes 2019 NOT REPORTED  0 % Final    Segs Absolute 2019 8.40  1.3 - 9.1 k/uL Final    Absolute Lymph # 2019 1.80  1.0 - 4.8 k/uL Final    Absolute Mono # 08/01/2019 0.90  0.1 - 1.3 k/uL Final    Absolute Eos # 08/01/2019 0.00  0.0 - 0.4 k/uL Final    Basophils # 08/01/2019 0.00  0.0 - 0.2 k/uL Final    Absolute Immature Granulocyte 08/01/2019 NOT REPORTED  0.00 - 0.30 k/uL Final    WBC Morphology 08/01/2019 NOT REPORTED   Final    RBC Morphology 08/01/2019 NOT REPORTED   Final    Platelet Estimate 18/48/1546 NOT REPORTED   Final    Expiration Date 08/01/2019 08/04/2019   Final    Arm Band Number 08/01/2019 [id number]   Final    ABO/Rh 08/01/2019 O POSITIVE   Final    Antibody Screen 08/01/2019 NEGATIVE   Final    T. pallidum, IgG 08/01/2019 NONREACTIVE  NONREACTIVE Final    Comment:       T. pallidum antibodies are not detected. There is no serological evidence of infection with T. pallidum (early primary syphilis   cannot be excluded). Retest in 2-4 weeks if syphilis is clinically suspect.             Color, UA 08/01/2019 YELLOW  YELLOW Final    Turbidity UA 08/01/2019 CLOUDY* CLEAR Final    Glucose, Ur 08/01/2019 NEGATIVE  NEGATIVE Final    Bilirubin Urine 08/01/2019 NEGATIVE  NEGATIVE Final    Ketones, Urine 08/01/2019 NEGATIVE  NEGATIVE Final    Specific [place], UA 08/01/2019 1.021  1.000 - 1.030 Final    Urine Hgb 08/01/2019 SMALL* NEGATIVE Final    pH, UA 08/01/2019 6.5  5.0 - 8.0 Final    Protein, UA 08/01/2019 NEGATIVE  NEGATIVE Final    Urobilinogen, Urine 08/01/2019 Normal  Normal Final    Nitrite, Urine 08/01/2019 NEGATIVE  NEGATIVE Final    Leukocyte Esterase, Urine 08/01/2019 MOD* NEGATIVE Final    Urinalysis Comments 08/01/2019 NOT REPORTED   Final    Amphetamine Screen, Ur 08/01/2019 NEGATIVE  NEGATIVE Final    Comment:       (Positive cutoff 1000 ng/mL)                  Barbiturate Screen, Ur 08/01/2019 NEGATIVE  NEGATIVE Final    Comment:       (Positive cutoff 200 ng/mL)                  Benzodiazepine Screen, Urine 08/01/2019 NEGATIVE  NEGATIVE Final    Comment:       (Positive cutoff 200 ng/mL)                  Cocaine  Amorphous,  08/01/2019 NOT REPORTED  None Final    Other Observations  08/01/2019 NOT REPORTED  NOT REQ. Final    Yeast,  08/01/2019 NOT REPORTED  None Final           Discharge Instructions for Labor and Delivery, Vaginal Birth     A vaginal birth refers to the baby being delivered through the vagina. The amount of time that labor can take varies greatly. Labor for the average first-born baby is about 12 hours. Usually your hospital stay after a routine delivery is no more than two nights. Some new mothers go home the same day. Recovery from childbirth varies depending upon whether you had an episiotomy (an incision in the perineum, the area between your vaginal opening and your anus), the duration of labor and delivery, and the amount of rest you get. In general, it takes about 6-8 weeks for a woman's body to recover from childbirth. What You Will Need   Along with your medications, you will need the following:   · Sanitary pads    · Nursing pads    · Witch hazel pads    · Sitz bath    Steps to [address] will want to arrange for transportation home for you and your baby. The baby will need a car seat. You will receive instructions in the hospital for breastfeeding and taking care of the perineum area. You may use ointment for cracked nipples or warm water rinses to your perineum. · You will need to wear sanitary pads for about six weeks after delivery. · If you had an episiotomy or vaginal tear, you will be sent home with a plastic squirt bottle. Fill it with warm water and squirt over the vaginal and anal area every time you urinate and defecate. · Warm baths can be soothing to healing tissues. · Apply warm or cold cloths to sore breasts. · Apply ointment to cracked nipples. · Use nursing pads for leaky breast.    · Apply witch hazel pads to sore perineum (area between vagina and anus). · Ask your doctor about when it is safe for you to shower or bathe.     · Sit

## 2019-08-02 NOTE — CARE COORDINATION
Social work: spoke with pt due to history of Marijuana use and history of depression. .  Provided both alcohol and drug treatment information as well as treatment information on mental health. Pt is aware of most resources including WIC. Father of baby is involved and does work to help support family. Pt also works at [employer] and states they have been good about time off for baby so far. This is 2nd child for pt and she has all of the car seats and clothes needed per pt. She states there is no issue with drugs for her at this time. And per RN did not test possitive at admission. Cord not sent. Discussed post partem depression with pt and the use of the resources provided. She is alert and willing to accept the information. She is smiling and seems very happy about this baby and life at this time. Will follow as needed.   Annie adams

## 2019-08-03 VITALS
DIASTOLIC BLOOD PRESSURE: 61 MMHG | BODY MASS INDEX: 32 KG/M2 | HEART RATE: 83 BPM | WEIGHT: 163 LBS | HEIGHT: 60 IN | RESPIRATION RATE: 16 BRPM | TEMPERATURE: 97.5 F | SYSTOLIC BLOOD PRESSURE: 116 MMHG | OXYGEN SATURATION: 99 %

## 2019-08-03 PROCEDURE — 6370000000 HC RX 637 (ALT 250 FOR IP): Performed by: STUDENT IN AN ORGANIZED HEALTH CARE EDUCATION/TRAINING PROGRAM

## 2019-08-03 RX ADMIN — DOCUSATE SODIUM 100 MG: 100 CAPSULE, LIQUID FILLED ORAL at 08:35

## 2019-08-03 NOTE — FLOWSHEET NOTE
Discharge instructions given per order, patient signs and states understanding. Copies given. Rx given.

## 2019-08-03 NOTE — PROGRESS NOTES
Postpartum Hgb/Hct completed: stable at 10.2   - Naproxen/tylenol for pain control   2. Rh positive/Rubella immune  3. Bottle feeding   4. Hx Depression   - Mood stable with no meds at this time   - Denies s/s of postpartum depression   - Denies SI/HI  5. Continue post partum care. Discharge today. Counseling Completed:  Secondary Smoke risks and Sudden Infant Death Syndrome were reviewed with recommendations. Infant sleeping, \"back to sleep\" and avoidance of co-sleeping recommendations were reviewed. Signs and Symptoms of Post Partum Depression were reviewed. The patient is to call if any occur. Signs and symptoms of Mastitis were reviewed. The patient is to call if any occur for follow up.   Discharge instructions including pelvic rest, no driving with pain medicine and office follow-up were reviewed with patient     Attending Physician: Dr. Beatrice Lange DO  Ob/Gyn Resident   8/3/2019, 6:38 AM

## 2019-08-05 ENCOUNTER — TELEPHONE (OUTPATIENT)
Dept: OBGYN CLINIC | Age: 29
End: 2019-08-05

## 2019-08-05 LAB — SURGICAL PATHOLOGY REPORT: NORMAL

## 2019-08-05 NOTE — TELEPHONE ENCOUNTER
----- Message from HALEY Noble - CNP sent at 8/5/2019  7:37 AM EDT -----  +UTI  Ampicillin 500mg PO QID x 10 days- please see if patient was sent home prescription from hospital after delivery for antibiotic. Otherwise please treat UTI.

## 2019-08-06 ENCOUNTER — TELEPHONE (OUTPATIENT)
Dept: OBGYN CLINIC | Age: 29
End: 2019-08-06

## 2019-08-06 RX ORDER — AMPICILLIN 500 MG/1
500 CAPSULE ORAL 4 TIMES DAILY
Qty: 40 CAPSULE | Refills: 0 | Status: SHIPPED | OUTPATIENT
Start: 2019-08-06 | End: 2019-08-16

## 2019-11-18 ENCOUNTER — OFFICE VISIT (OUTPATIENT)
Dept: OBGYN CLINIC | Age: 29
End: 2019-11-18
Payer: COMMERCIAL

## 2019-11-18 VITALS
DIASTOLIC BLOOD PRESSURE: 78 MMHG | BODY MASS INDEX: 30.23 KG/M2 | HEIGHT: 60 IN | WEIGHT: 154 LBS | HEART RATE: 76 BPM | SYSTOLIC BLOOD PRESSURE: 116 MMHG

## 2019-11-18 DIAGNOSIS — N94.6 DYSMENORRHEA: ICD-10-CM

## 2019-11-18 DIAGNOSIS — Z30.09 BIRTH CONTROL COUNSELING: Primary | ICD-10-CM

## 2019-11-18 PROCEDURE — G8417 CALC BMI ABV UP PARAM F/U: HCPCS | Performed by: CLINICAL NURSE SPECIALIST

## 2019-11-18 PROCEDURE — G8427 DOCREV CUR MEDS BY ELIG CLIN: HCPCS | Performed by: CLINICAL NURSE SPECIALIST

## 2019-11-18 PROCEDURE — 99213 OFFICE O/P EST LOW 20 MIN: CPT | Performed by: CLINICAL NURSE SPECIALIST

## 2019-11-18 PROCEDURE — 1036F TOBACCO NON-USER: CPT | Performed by: CLINICAL NURSE SPECIALIST

## 2019-11-18 PROCEDURE — G8484 FLU IMMUNIZE NO ADMIN: HCPCS | Performed by: CLINICAL NURSE SPECIALIST

## 2019-12-08 ENCOUNTER — HOSPITAL ENCOUNTER (EMERGENCY)
Age: 29
Discharge: HOME | End: 2019-12-08
Attending: EMERGENCY MEDICINE
Payer: COMMERCIAL

## 2019-12-08 VITALS
SYSTOLIC BLOOD PRESSURE: 132 MMHG | TEMPERATURE: 98.7 F | HEIGHT: 60 IN | OXYGEN SATURATION: 100 % | WEIGHT: 150 LBS | RESPIRATION RATE: 16 BRPM | HEART RATE: 95 BPM | DIASTOLIC BLOOD PRESSURE: 79 MMHG | BODY MASS INDEX: 29.45 KG/M2

## 2019-12-08 DIAGNOSIS — F10.920 ACUTE ALCOHOLIC INTOXICATION WITHOUT COMPLICATION (HCC): Primary | ICD-10-CM

## 2019-12-08 PROCEDURE — 99284 EMERGENCY DEPT VISIT MOD MDM: CPT

## 2019-12-08 PROCEDURE — 6370000000 HC RX 637 (ALT 250 FOR IP): Performed by: EMERGENCY MEDICINE

## 2019-12-08 RX ORDER — ONDANSETRON 4 MG/1
4 TABLET, ORALLY DISINTEGRATING ORAL ONCE
Status: COMPLETED | OUTPATIENT
Start: 2019-12-08 | End: 2019-12-08

## 2019-12-08 RX ORDER — ONDANSETRON 4 MG/1
4 TABLET, ORALLY DISINTEGRATING ORAL 3 TIMES DAILY PRN
Qty: 21 TABLET | Refills: 0 | Status: SHIPPED | OUTPATIENT
Start: 2019-12-08 | End: 2021-12-16 | Stop reason: ALTCHOICE

## 2019-12-08 RX ADMIN — ONDANSETRON 4 MG: 4 TABLET, ORALLY DISINTEGRATING ORAL at 20:45

## 2020-01-14 ENCOUNTER — TELEPHONE (OUTPATIENT)
Dept: OBGYN CLINIC | Age: 30
End: 2020-01-14

## 2020-06-10 DIAGNOSIS — R35.0 FREQUENT URINATION: Primary | ICD-10-CM

## 2021-08-24 ENCOUNTER — TELEPHONE (OUTPATIENT)
Dept: OBGYN CLINIC | Age: 31
End: 2021-08-24

## 2021-08-24 ENCOUNTER — HOSPITAL ENCOUNTER (OUTPATIENT)
Age: 31
Discharge: HOME | End: 2021-08-24
Payer: COMMERCIAL

## 2021-08-24 DIAGNOSIS — N92.6 MISSED MENSES: ICD-10-CM

## 2021-08-24 DIAGNOSIS — N92.6 MISSED MENSES: Primary | ICD-10-CM

## 2021-08-24 LAB — HCG QUANTITATIVE: ABNORMAL IU/L

## 2021-08-24 PROCEDURE — 84702 CHORIONIC GONADOTROPIN TEST: CPT

## 2021-08-24 PROCEDURE — 36415 COLL VENOUS BLD VENIPUNCTURE: CPT

## 2021-08-24 RX ORDER — PNV NO.95/FERROUS FUM/FOLIC AC 28MG-0.8MG
1 TABLET ORAL DAILY
Qty: 30 TABLET | Refills: 12 | Status: SHIPPED | OUTPATIENT
Start: 2021-08-24 | End: 2022-07-27

## 2021-08-24 NOTE — TELEPHONE ENCOUNTER
Patient notified of results, prenatal vitamins sent to patients pharmacy, sent to [facility] for scheduling of intake.  LMP was 7/10/21

## 2021-08-24 NOTE — TELEPHONE ENCOUNTER
----- Message from HALEY Pugh CNP sent at 8/24/2021 12:03 PM EDT -----  Please schedule for new OB intake/ ultrasound  Prenatal vitamin 1 PO qD with 12 refills.

## 2021-09-13 ENCOUNTER — PROCEDURE VISIT (OUTPATIENT)
Dept: OBGYN CLINIC | Age: 31
End: 2021-09-13
Payer: COMMERCIAL

## 2021-09-13 ENCOUNTER — INITIAL PRENATAL (OUTPATIENT)
Dept: OBGYN CLINIC | Age: 31
End: 2021-09-13

## 2021-09-13 VITALS
SYSTOLIC BLOOD PRESSURE: 112 MMHG | HEIGHT: 60 IN | WEIGHT: 163 LBS | BODY MASS INDEX: 32 KG/M2 | DIASTOLIC BLOOD PRESSURE: 68 MMHG

## 2021-09-13 DIAGNOSIS — O09.91 SUPERVISION OF HIGH RISK PREGNANCY IN FIRST TRIMESTER: Primary | ICD-10-CM

## 2021-09-13 DIAGNOSIS — O36.80X0 PREGNANCY WITH INCONCLUSIVE FETAL VIABILITY, SINGLE OR UNSPECIFIED FETUS: Primary | ICD-10-CM

## 2021-09-13 DIAGNOSIS — Z34.90 EARLY STAGE OF PREGNANCY: Primary | ICD-10-CM

## 2021-09-13 DIAGNOSIS — Z3A.09 9 WEEKS GESTATION OF PREGNANCY: ICD-10-CM

## 2021-09-13 PROBLEM — O41.8X90 SUBCHORIONIC HEMATOMA, ANTEPARTUM: Status: ACTIVE | Noted: 2021-09-13

## 2021-09-13 PROBLEM — O46.8X9 SUBCHORIONIC HEMATOMA, ANTEPARTUM: Status: ACTIVE | Noted: 2021-09-13

## 2021-09-13 LAB
CRL: NORMAL
SAC DIAMETER: NORMAL

## 2021-09-13 PROCEDURE — 76801 OB US < 14 WKS SINGLE FETUS: CPT | Performed by: OBSTETRICS & GYNECOLOGY

## 2021-09-13 PROCEDURE — 59899 UNLISTED PX MAT CARE&DLVR: CPT | Performed by: NURSE PRACTITIONER

## 2021-09-13 RX ORDER — PYRIDOXINE HCL (VITAMIN B6) 50 MG
50 TABLET ORAL 2 TIMES DAILY
Qty: 30 TABLET | Refills: 0 | Status: SHIPPED | OUTPATIENT
Start: 2021-09-13 | End: 2021-12-16 | Stop reason: ALTCHOICE

## 2021-09-13 NOTE — PROGRESS NOTES
Patient presents to office for OB intake, nurse visit only. Intake completed following ultrasound in office to confirm pregnancy dating/viability. Based on ultrasound, patient is currently 9w2d  gestation, Estimated Date of Delivery: 4/16/22. Patient presents to intake FOB. This was an planned pregnancy. Patient currently taking has a current medication list which includes the following prescription(s): pyridoxine, doxylamine succinate, prenatal vitamin, and ondansetron. . Patient's medical, surgical, obstetrical and family history reviewed. See EHR for details. Patient prenatal lab work given to patient at this visit as well as OB education material. New OB consent forms signed. Patient accepted first trimester screening. Cystic Fibrosis screening accepted . Pt's mother and brother with diabetes, early 1hr GTT ordered. Referral placed to [facility] for first trimester screen. Subchorionic bleed noted on US; pt given restrictions for pelvic rest.  Patient scheduled for follow up OB appointment with Thao Godfrey NP on 9/27/21. Patient instructed to complete lab work prior to follow up OB appointment.

## 2021-09-23 ENCOUNTER — HOSPITAL ENCOUNTER (OUTPATIENT)
Age: 31
Discharge: HOME | End: 2021-09-23
Payer: COMMERCIAL

## 2021-09-23 DIAGNOSIS — Z3A.09 9 WEEKS GESTATION OF PREGNANCY: ICD-10-CM

## 2021-09-23 DIAGNOSIS — Z34.90 EARLY STAGE OF PREGNANCY: ICD-10-CM

## 2021-09-23 LAB
-: ABNORMAL
ABO/RH: NORMAL
ABSOLUTE EOS #: 0.1 K/UL (ref 0–0.4)
ABSOLUTE IMMATURE GRANULOCYTE: ABNORMAL K/UL (ref 0–0.3)
ABSOLUTE LYMPH #: 1.8 K/UL (ref 1–4.8)
ABSOLUTE MONO #: 0.7 K/UL (ref 0.1–1.3)
AMORPHOUS: ABNORMAL
ANTIBODY SCREEN: NEGATIVE
BACTERIA: ABNORMAL
BASOPHILS # BLD: 0 % (ref 0–2)
BASOPHILS ABSOLUTE: 0 K/UL (ref 0–0.2)
BILIRUBIN URINE: NEGATIVE
CASTS UA: ABNORMAL /LPF
COLOR: YELLOW
COMMENT UA: ABNORMAL
CRYSTALS, UA: ABNORMAL /HPF
DIFFERENTIAL TYPE: ABNORMAL
EOSINOPHILS RELATIVE PERCENT: 1 % (ref 0–4)
EPITHELIAL CELLS UA: ABNORMAL /HPF
GLUCOSE ADMINISTRATION: NORMAL
GLUCOSE TOLERANCE SCREEN 50G: 80 MG/DL (ref 70–135)
GLUCOSE URINE: NEGATIVE
HCT VFR BLD CALC: 40.9 % (ref 36–46)
HEMOGLOBIN: 13.5 G/DL (ref 12–16)
HEPATITIS B SURFACE ANTIGEN: NONREACTIVE
HIV AG/AB: NONREACTIVE
IMMATURE GRANULOCYTES: ABNORMAL %
KETONES, URINE: ABNORMAL
LEUKOCYTE ESTERASE, URINE: NEGATIVE
LYMPHOCYTES # BLD: 15 % (ref 24–44)
MCH RBC QN AUTO: 29.5 PG (ref 26–34)
MCHC RBC AUTO-ENTMCNC: 33 G/DL (ref 31–37)
MCV RBC AUTO: 89.2 FL (ref 80–100)
MONOCYTES # BLD: 6 % (ref 1–7)
MUCUS: ABNORMAL
NITRITE, URINE: NEGATIVE
NRBC AUTOMATED: ABNORMAL PER 100 WBC
OTHER OBSERVATIONS UA: ABNORMAL
PDW BLD-RTO: 13.8 % (ref 11.5–14.9)
PH UA: 6 (ref 5–8)
PLATELET # BLD: 400 K/UL (ref 150–450)
PLATELET ESTIMATE: ABNORMAL
PMV BLD AUTO: 7.5 FL (ref 6–12)
PROTEIN UA: NEGATIVE
RBC # BLD: 4.58 M/UL (ref 4–5.2)
RBC # BLD: ABNORMAL 10*6/UL
RBC UA: ABNORMAL /HPF
RENAL EPITHELIAL, UA: ABNORMAL /HPF
RUBV IGG SER QL: 63.4 IU/ML
SEG NEUTROPHILS: 78 % (ref 36–66)
SEGMENTED NEUTROPHILS ABSOLUTE COUNT: 9.6 K/UL (ref 1.3–9.1)
SPECIFIC GRAVITY UA: 1.03 (ref 1–1.03)
T. PALLIDUM, IGG: NONREACTIVE
TRICHOMONAS: ABNORMAL
TSH SERPL DL<=0.05 MIU/L-ACNC: 0.9 MIU/L (ref 0.3–5)
TURBIDITY: ABNORMAL
URINE HGB: NEGATIVE
UROBILINOGEN, URINE: NORMAL
WBC # BLD: 12.3 K/UL (ref 3.5–11)
WBC # BLD: ABNORMAL 10*3/UL
WBC UA: ABNORMAL /HPF
YEAST: ABNORMAL

## 2021-09-23 PROCEDURE — 86780 TREPONEMA PALLIDUM: CPT

## 2021-09-23 PROCEDURE — 86901 BLOOD TYPING SEROLOGIC RH(D): CPT

## 2021-09-23 PROCEDURE — 81001 URINALYSIS AUTO W/SCOPE: CPT

## 2021-09-23 PROCEDURE — 36415 COLL VENOUS BLD VENIPUNCTURE: CPT

## 2021-09-23 PROCEDURE — 82950 GLUCOSE TEST: CPT

## 2021-09-23 PROCEDURE — 84443 ASSAY THYROID STIM HORMONE: CPT

## 2021-09-23 PROCEDURE — 81220 CFTR GENE COM VARIANTS: CPT

## 2021-09-23 PROCEDURE — 87389 HIV-1 AG W/HIV-1&-2 AB AG IA: CPT

## 2021-09-23 PROCEDURE — 86762 RUBELLA ANTIBODY: CPT

## 2021-09-23 PROCEDURE — 86850 RBC ANTIBODY SCREEN: CPT

## 2021-09-23 PROCEDURE — 87340 HEPATITIS B SURFACE AG IA: CPT

## 2021-09-23 PROCEDURE — 85025 COMPLETE CBC W/AUTO DIFF WBC: CPT

## 2021-09-23 PROCEDURE — 86900 BLOOD TYPING SEROLOGIC ABO: CPT

## 2021-09-27 ENCOUNTER — HOSPITAL ENCOUNTER (OUTPATIENT)
Age: 31
Setting detail: SPECIMEN
Discharge: HOME | End: 2021-09-27
Payer: COMMERCIAL

## 2021-09-27 ENCOUNTER — ROUTINE PRENATAL (OUTPATIENT)
Dept: OBGYN CLINIC | Age: 31
End: 2021-09-27
Payer: COMMERCIAL

## 2021-09-27 VITALS
BODY MASS INDEX: 31.64 KG/M2 | SYSTOLIC BLOOD PRESSURE: 112 MMHG | WEIGHT: 162 LBS | HEART RATE: 76 BPM | DIASTOLIC BLOOD PRESSURE: 70 MMHG

## 2021-09-27 DIAGNOSIS — Z3A.11 11 WEEKS GESTATION OF PREGNANCY: Primary | ICD-10-CM

## 2021-09-27 DIAGNOSIS — O41.8X90 SUBCHORIONIC HEMATOMA, ANTEPARTUM, SINGLE OR UNSPECIFIED FETUS: ICD-10-CM

## 2021-09-27 DIAGNOSIS — Z3A.11 11 WEEKS GESTATION OF PREGNANCY: ICD-10-CM

## 2021-09-27 DIAGNOSIS — O46.8X9 SUBCHORIONIC HEMATOMA, ANTEPARTUM, SINGLE OR UNSPECIFIED FETUS: ICD-10-CM

## 2021-09-27 PROBLEM — O99.810 ABNORMAL GLUCOSE TOLERANCE TEST (GTT) DURING PREGNANCY, ANTEPARTUM: Status: RESOLVED | Noted: 2019-05-13 | Resolved: 2021-09-27

## 2021-09-27 PROBLEM — E03.8 TSH DEFICIENCY: Status: RESOLVED | Noted: 2019-01-16 | Resolved: 2021-09-27

## 2021-09-27 PROCEDURE — G8419 CALC BMI OUT NRM PARAM NOF/U: HCPCS | Performed by: NURSE PRACTITIONER

## 2021-09-27 PROCEDURE — G8427 DOCREV CUR MEDS BY ELIG CLIN: HCPCS | Performed by: NURSE PRACTITIONER

## 2021-09-27 PROCEDURE — 1036F TOBACCO NON-USER: CPT | Performed by: NURSE PRACTITIONER

## 2021-09-27 PROCEDURE — 99214 OFFICE O/P EST MOD 30 MIN: CPT | Performed by: NURSE PRACTITIONER

## 2021-09-27 NOTE — PROGRESS NOTES
Richard Guerrier  2021    YOB: 1990   No LMP recorded. Patient is pregnant. 30S2R  [id number]      Primary Care Physician: Pedro Alvarado MD        CC: Initial Prenatal Visit    Subjective:     Richard Guerrier is a 31 y.o. female [id number]    is being seen today for her first obstetrical visit. This is not a planned pregnancy. She is at 11w2d  Her obstetrical history is significant for subchorionic bleed. Relationship with FOB: significant other, living together. Patient does not intend to breast feed. Pregnancy history fully reviewed. Mother's ethnicity:      Objective:   Blood pressure 112/70, pulse 76, weight 162 lb (73.5 kg), not currently breastfeeding. OB History    Para Term  AB Living   3 2 2 0 0 2   SAB TAB Ectopic Molar Multiple Live Births   0 0 0 0 0 2      # Outcome Date GA Lbr Kevin/2nd Weight Sex Delivery Anes PTL Lv   3 Current            2 Term 19 38w6d 02:07 / 00:05 7 lb 10.8 oz (3.48 kg) F Vag-Spont None N JOAN      Complications: Meconium at birth      Name: Litzy Midget: 8  Apgar5: 9   1 Term 2014 38w1d  6 lb 14 oz (3.118 kg) F Vag-Spont   JOAN       Past Medical History:   Diagnosis Date    Anemia     Depression     TSH deficiency 2019     History reviewed. No pertinent surgical history.    Social History     Socioeconomic History    Marital status: Single     Spouse name: Not on file    Number of children: Not on file    Years of education: Not on file    Highest education level: Not on file   Occupational History    Not on file   Tobacco Use    Smoking status: Never Smoker    Smokeless tobacco: Never Used   Vaping Use    Vaping Use: Never used   Substance and Sexual Activity    Alcohol use: Not Currently     Alcohol/week: 0.0 standard drinks     Comment: Social    Drug use: Not Currently     Types: Marijuana     Comment: occ    Sexual activity: Yes     Partners: Male   Other Topics Concern    Not on file   Social History Narrative    Not on file     Social Determinants of Health     Financial Resource Strain:     Difficulty of Paying Living Expenses:    Food Insecurity:     Worried About Running Out of Food in the Last Year:     [address] in the Last Year:    Transportation Needs:     Lack of Transportation (Medical):  Lack of Transportation (Non-Medical):    Physical Activity:     Days of Exercise per Week:     Minutes of Exercise per Session:    Stress:     Feeling of Stress :    Social Connections:     Frequency of Communication with Friends and Family:     Frequency of Social Gatherings with Friends and Family:     Attends Congregational Services:     Active Member of Clubs or Organizations:     Attends Club or Organization Meetings:     Marital Status:    Intimate Partner Violence:     Fear of Current or Ex-Partner:     Emotionally Abused:     Physically Abused:     Sexually Abused:      Family History   Problem Relation Age of Onset    Hypertension Other         G.Parents    Depression Mother     Hypertension Mother     Alcohol Abuse Mother     Diabetes Mother     Diabetes Other         G.Parents    Diabetes Brother        MEDICATIONS:  Current Outpatient Medications   Medication Sig Dispense Refill    pyridoxine (RA VITAMIN B-6) 50 MG tablet Take 1 tablet by mouth 2 times daily 30 tablet 0    doxyLAMINE succinate (GNP SLEEP AID) 25 MG tablet Take 1 tablet by mouth nightly 20 tablet 0    Prenatal Vit-Fe Fumarate-FA (PRENATAL VITAMIN) 27-0.8 MG TABS Take 1 tablet by mouth daily 30 tablet 12    ondansetron (ZOFRAN-ODT) 4 MG disintegrating tablet Take 1 tablet by mouth 3 times daily as needed for Nausea or Vomiting (Patient not taking: Reported on 9/13/2021) 21 tablet 0     No current facility-administered medications for this visit.            ALLERGIES:  Allergies as of 09/27/2021    (No Known Allergies)           Physical Exam Completed-See Epic instructed to stop smoking if currently using tobacco. Morbidity, mortality, and cessation programs were reviewed. The risks include but are not limited to increased risks of  labor,  delivery, premature rupture of membranes, intrauterine growth restriction, intrauterine fetal demise and abruptio placenta. Secondary smoke risks were also reviewed. Increases in cancer, respiratory problems, and sudden infant death syndrome were reviewed as well. The patient was informed of a 2-4% risk of congenital anomalies in the general population. She was also informed that karyotyping is the only way to evaluate the fetus for genetic problems and genetic lethal anomalies. Chorionic villous sampling, amniocentesis and NIPT testing were also discussed with morbidity rates in detail. She declined the procedure. Route of delivery and counseling on vaginal, operative vaginal, and  sections were completed with the risks of each to both the patient as well as her baby. The possibility of a blood transfusion was discussed as well. The patient was not opposed to receiving a transfusion if needed. Nuchal translucency/Quad Evaluation and MSAFP single marker testing was reviewed in detail with attention to timing of testing and their windows. For patients beyond the gestational age for Nuchal translucency evaluation Quad testing was recommended. Timing for the Quad test was reviewed. Benefits of the above testing was reviewed. A second trimester amniocentesis was also made available to the patient. Risks, Benefits and non-invasive alternative testing was reviewed. The literature regarding a questionable link to pitocin augmentation and induction of labor, the assistance of labor contractions and the initiation of contractions to help delivery, have been reviewed with the patient regarding the increased potential of having a  with Attention Deficit Hyperactivity Disorder and or Autism.  These two

## 2021-09-29 LAB — CYSTIC FIBROSIS: NORMAL

## 2021-09-30 LAB
CULTURE: NORMAL
CYTOLOGY REPORT: NORMAL
Lab: NORMAL
SPECIMEN DESCRIPTION: NORMAL

## 2021-10-01 ENCOUNTER — TELEPHONE (OUTPATIENT)
Dept: OBGYN CLINIC | Age: 31
End: 2021-10-01

## 2021-10-01 NOTE — TELEPHONE ENCOUNTER
----- Message from HALEY Lott CNP sent at 9/30/2021 12:12 PM EDT -----  Pap smear neg  Absent zone  Repeat pap smear in 1 year.   +bV- flagyl 500mg PO BID x 7 days after 15 weeks

## 2021-10-05 ENCOUNTER — ROUTINE PRENATAL (OUTPATIENT)
Dept: PERINATAL CARE | Age: 31
End: 2021-10-05
Payer: COMMERCIAL

## 2021-10-05 VITALS
SYSTOLIC BLOOD PRESSURE: 112 MMHG | WEIGHT: 160 LBS | BODY MASS INDEX: 31.41 KG/M2 | DIASTOLIC BLOOD PRESSURE: 68 MMHG | RESPIRATION RATE: 16 BRPM | HEIGHT: 60 IN | HEART RATE: 65 BPM | TEMPERATURE: 97.1 F

## 2021-10-05 DIAGNOSIS — Z36.9 FIRST TRIMESTER SCREENING: Primary | ICD-10-CM

## 2021-10-05 DIAGNOSIS — O99.211 OBESITY AFFECTING PREGNANCY IN FIRST TRIMESTER: ICD-10-CM

## 2021-10-05 DIAGNOSIS — O36.80X0 ENCOUNTER TO DETERMINE FETAL VIABILITY OF PREGNANCY, SINGLE OR UNSPECIFIED FETUS: ICD-10-CM

## 2021-10-05 DIAGNOSIS — Z3A.12 12 WEEKS GESTATION OF PREGNANCY: ICD-10-CM

## 2021-10-05 LAB
CRL: NORMAL
SAC DIAMETER: NORMAL

## 2021-10-05 PROCEDURE — 76801 OB US < 14 WKS SINGLE FETUS: CPT | Performed by: OBSTETRICS & GYNECOLOGY

## 2021-10-05 PROCEDURE — 76813 OB US NUCHAL MEAS 1 GEST: CPT | Performed by: OBSTETRICS & GYNECOLOGY

## 2021-10-14 ENCOUNTER — TELEPHONE (OUTPATIENT)
Dept: OBGYN CLINIC | Age: 31
End: 2021-10-14

## 2021-10-14 NOTE — TELEPHONE ENCOUNTER
----- Message from HALEY Florez CNP sent at 9/30/2021 12:12 PM EDT -----  Pap smear neg  Absent zone  Repeat pap smear in 1 year.   +bV- flagyl 500mg PO BID x 7 days after 15 weeks

## 2021-10-14 NOTE — TELEPHONE ENCOUNTER
----- Message from HALEY Roberts - CNP sent at 9/30/2021 12:12 PM EDT -----  Pap smear neg  Absent zone  Repeat pap smear in 1 year.   +bV- flagyl 500mg PO BID x 7 days after 15 weeks

## 2021-10-14 NOTE — TELEPHONE ENCOUNTER
Per Rodger Luna NP, pt notified of +BV; pt without symptoms. Informed pt to let us know of symptoms, and after 15 weeks can have flagyl.

## 2021-10-25 ENCOUNTER — ROUTINE PRENATAL (OUTPATIENT)
Dept: OBGYN CLINIC | Age: 31
End: 2021-10-25
Payer: COMMERCIAL

## 2021-10-25 VITALS
WEIGHT: 164 LBS | SYSTOLIC BLOOD PRESSURE: 108 MMHG | BODY MASS INDEX: 32.03 KG/M2 | HEART RATE: 72 BPM | DIASTOLIC BLOOD PRESSURE: 64 MMHG

## 2021-10-25 DIAGNOSIS — O46.8X9 SUBCHORIONIC HEMATOMA, ANTEPARTUM, SINGLE OR UNSPECIFIED FETUS: ICD-10-CM

## 2021-10-25 DIAGNOSIS — Z34.90 THIRD PREGNANCY: ICD-10-CM

## 2021-10-25 DIAGNOSIS — Z3A.15 15 WEEKS GESTATION OF PREGNANCY: ICD-10-CM

## 2021-10-25 DIAGNOSIS — O41.8X90 SUBCHORIONIC HEMATOMA, ANTEPARTUM, SINGLE OR UNSPECIFIED FETUS: ICD-10-CM

## 2021-10-25 DIAGNOSIS — O09.92 HIGH-RISK PREGNANCY IN SECOND TRIMESTER: Primary | ICD-10-CM

## 2021-10-25 DIAGNOSIS — F12.10 CANNABIS ABUSE: ICD-10-CM

## 2021-10-25 PROCEDURE — G8484 FLU IMMUNIZE NO ADMIN: HCPCS | Performed by: NURSE PRACTITIONER

## 2021-10-25 PROCEDURE — 1036F TOBACCO NON-USER: CPT | Performed by: NURSE PRACTITIONER

## 2021-10-25 PROCEDURE — G8427 DOCREV CUR MEDS BY ELIG CLIN: HCPCS | Performed by: NURSE PRACTITIONER

## 2021-10-25 PROCEDURE — 99213 OFFICE O/P EST LOW 20 MIN: CPT | Performed by: NURSE PRACTITIONER

## 2021-10-25 PROCEDURE — G8419 CALC BMI OUT NRM PARAM NOF/U: HCPCS | Performed by: NURSE PRACTITIONER

## 2021-10-25 RX ORDER — METRONIDAZOLE 500 MG/1
500 TABLET ORAL 2 TIMES DAILY
Qty: 14 TABLET | Refills: 0 | Status: SHIPPED | OUTPATIENT
Start: 2021-10-25 | End: 2021-11-01

## 2021-10-25 NOTE — PROGRESS NOTES
Jayme Conn is a 31 y.o. female 15w2d        OB History    Para Term  AB Living   3 2 2 0 0 2   SAB TAB Ectopic Molar Multiple Live Births           0 2      # Outcome Date GA Lbr Kevin/2nd Weight Sex Delivery Anes PTL Lv   3 Current            2 Term 19 38w6d 02:07 / 00:05 7 lb 10.8 oz (3.48 kg) F Vag-Spont None N JOAN      Complications: Meconium at birth   1 Term 2014 38w1d  6 lb 14 oz (3.118 kg) F Vag-Spont   JOAN             Vitals  BP: 108/64  Weight: 164 lb (74.4 kg)  Pulse: 72  Patient Position: Sitting  Albumin: Negative  Glucose: Negative  Fundal Height (cm): 15 cm  Fetal Heart Rate: 145      The patient was seen and evaluated. There was N/A fetal movements. No contractions or leakage of fluid. Signs and symptoms of  labor as well as labor were reviewed. The Nuchal Translucency testing was reviewed and found to be normal. A single marker MSAFP was ordered for a 15-20 week gestational age window. TOP ST OH Reviewed. Dates were reviewed with the patient. An 18-22 week anatomy ultrasound has been ordered. The patient will return to the office for her next visit in 4 weeks. See antepartum flow sheet. 10/1/21 will need flagyl after 15 weeks gestation. 21  PRAF completed        Assessment:  1. Jayme Conn is a 31 y.o. female  2. [id number]  3. 15w2d    Patient Active Problem List    Diagnosis Date Noted    Third pregnancy 10/25/2021     Priority: High    Subchorionic hematoma, antepartum 2021     Priority: High     Pelvic rest/noheavy lifting       Mood disorder ([facility] [address] 2016     Priority: High     19 F Apg 8/9 Wt 7#10 2019    Depression     Rh+/RI/GBS neg 2019    Cannabis abuse 2016        Diagnosis Orders   1. High-risk pregnancy in second trimester  Alpha Fetoprotein, Maternal   2. 15 weeks gestation of pregnancy  Alpha Fetoprotein, Maternal   3.  Subchorionic hematoma, antepartum, single or unspecified fetus 4. Cannabis abuse     5. Third pregnancy           Plan:    Lab slip given  Rx flagyl sent for BV in early pregnancy      Patient was seen with total face to face time of 15 minutes. More than 50% of this visit was on counseling and education regarding her    Diagnosis Orders   1. High-risk pregnancy in second trimester  Alpha Fetoprotein, Maternal   2. 15 weeks gestation of pregnancy  Alpha Fetoprotein, Maternal   3. Subchorionic hematoma, antepartum, single or unspecified fetus     4. Cannabis abuse     5. Third pregnancy      and her options. She was also counseled on her preventative health maintenance recommendations and follow-up.

## 2021-11-02 ENCOUNTER — HOSPITAL ENCOUNTER (EMERGENCY)
Age: 31
Discharge: HOME | End: 2021-11-02
Attending: EMERGENCY MEDICINE
Payer: COMMERCIAL

## 2021-11-02 VITALS
RESPIRATION RATE: 18 BRPM | DIASTOLIC BLOOD PRESSURE: 52 MMHG | HEART RATE: 89 BPM | SYSTOLIC BLOOD PRESSURE: 107 MMHG | OXYGEN SATURATION: 100 % | TEMPERATURE: 97.9 F

## 2021-11-02 DIAGNOSIS — R82.71 ASYMPTOMATIC BACTERIURIA DURING PREGNANCY: ICD-10-CM

## 2021-11-02 DIAGNOSIS — O21.9 NAUSEA AND VOMITING IN PREGNANCY: Primary | ICD-10-CM

## 2021-11-02 DIAGNOSIS — O99.891 ASYMPTOMATIC BACTERIURIA DURING PREGNANCY: ICD-10-CM

## 2021-11-02 LAB
-: ABNORMAL
ABSOLUTE EOS #: 0 K/UL (ref 0–0.4)
ABSOLUTE IMMATURE GRANULOCYTE: ABNORMAL K/UL (ref 0–0.3)
ABSOLUTE LYMPH #: 1.4 K/UL (ref 1–4.8)
ABSOLUTE MONO #: 0.6 K/UL (ref 0.1–1.3)
ALBUMIN SERPL-MCNC: 4.1 G/DL (ref 3.5–5.2)
ALBUMIN/GLOBULIN RATIO: ABNORMAL (ref 1–2.5)
ALP BLD-CCNC: 73 U/L (ref 35–104)
ALT SERPL-CCNC: 8 U/L (ref 5–33)
AMORPHOUS: ABNORMAL
ANION GAP SERPL CALCULATED.3IONS-SCNC: 12 MMOL/L (ref 9–17)
AST SERPL-CCNC: 14 U/L
BACTERIA: ABNORMAL
BASOPHILS # BLD: 1 % (ref 0–2)
BASOPHILS ABSOLUTE: 0.1 K/UL (ref 0–0.2)
BILIRUB SERPL-MCNC: 0.34 MG/DL (ref 0.3–1.2)
BILIRUBIN URINE: NEGATIVE
BUN BLDV-MCNC: 6 MG/DL (ref 6–20)
BUN/CREAT BLD: ABNORMAL (ref 9–20)
CALCIUM SERPL-MCNC: 9 MG/DL (ref 8.6–10.4)
CASTS UA: ABNORMAL /LPF
CHLORIDE BLD-SCNC: 101 MMOL/L (ref 98–107)
CO2: 19 MMOL/L (ref 20–31)
COLOR: YELLOW
COMMENT UA: ABNORMAL
CREAT SERPL-MCNC: 0.51 MG/DL (ref 0.5–0.9)
CRYSTALS, UA: ABNORMAL /HPF
DIFFERENTIAL TYPE: ABNORMAL
EOSINOPHILS RELATIVE PERCENT: 0 % (ref 0–4)
EPITHELIAL CELLS UA: ABNORMAL /HPF
GFR AFRICAN AMERICAN: >60 ML/MIN
GFR NON-AFRICAN AMERICAN: >60 ML/MIN
GFR SERPL CREATININE-BSD FRML MDRD: ABNORMAL ML/MIN/{1.73_M2}
GFR SERPL CREATININE-BSD FRML MDRD: ABNORMAL ML/MIN/{1.73_M2}
GLUCOSE BLD-MCNC: 91 MG/DL (ref 70–99)
GLUCOSE URINE: NEGATIVE
HCT VFR BLD CALC: 40.4 % (ref 36–46)
HEMOGLOBIN: 13.5 G/DL (ref 12–16)
IMMATURE GRANULOCYTES: ABNORMAL %
KETONES, URINE: NEGATIVE
LEUKOCYTE ESTERASE, URINE: NEGATIVE
LIPASE: 27 U/L (ref 13–60)
LYMPHOCYTES # BLD: 11 % (ref 24–44)
MCH RBC QN AUTO: 29.8 PG (ref 26–34)
MCHC RBC AUTO-ENTMCNC: 33.4 G/DL (ref 31–37)
MCV RBC AUTO: 89.1 FL (ref 80–100)
MONOCYTES # BLD: 4 % (ref 1–7)
MUCUS: ABNORMAL
NITRITE, URINE: NEGATIVE
NRBC AUTOMATED: ABNORMAL PER 100 WBC
OTHER OBSERVATIONS UA: ABNORMAL
PDW BLD-RTO: 13.9 % (ref 11.5–14.9)
PH UA: 5.5 (ref 5–8)
PLATELET # BLD: 440 K/UL (ref 150–450)
PLATELET ESTIMATE: ABNORMAL
PMV BLD AUTO: 6.5 FL (ref 6–12)
POTASSIUM SERPL-SCNC: 4.3 MMOL/L (ref 3.7–5.3)
PROTEIN UA: NEGATIVE
RBC # BLD: 4.54 M/UL (ref 4–5.2)
RBC # BLD: ABNORMAL 10*6/UL
RBC UA: ABNORMAL /HPF
RENAL EPITHELIAL, UA: ABNORMAL /HPF
SEG NEUTROPHILS: 84 % (ref 36–66)
SEGMENTED NEUTROPHILS ABSOLUTE COUNT: 11 K/UL (ref 1.3–9.1)
SODIUM BLD-SCNC: 132 MMOL/L (ref 135–144)
SPECIFIC GRAVITY UA: 1.02 (ref 1–1.03)
TOTAL PROTEIN: 7.7 G/DL (ref 6.4–8.3)
TRICHOMONAS: ABNORMAL
TURBIDITY: ABNORMAL
URINE HGB: ABNORMAL
UROBILINOGEN, URINE: NORMAL
WBC # BLD: 13 K/UL (ref 3.5–11)
WBC # BLD: ABNORMAL 10*3/UL
WBC UA: ABNORMAL /HPF
YEAST: ABNORMAL

## 2021-11-02 PROCEDURE — 80053 COMPREHEN METABOLIC PANEL: CPT

## 2021-11-02 PROCEDURE — 96374 THER/PROPH/DIAG INJ IV PUSH: CPT

## 2021-11-02 PROCEDURE — 99283 EMERGENCY DEPT VISIT LOW MDM: CPT

## 2021-11-02 PROCEDURE — 2580000003 HC RX 258: Performed by: EMERGENCY MEDICINE

## 2021-11-02 PROCEDURE — 83690 ASSAY OF LIPASE: CPT

## 2021-11-02 PROCEDURE — 36415 COLL VENOUS BLD VENIPUNCTURE: CPT

## 2021-11-02 PROCEDURE — 81001 URINALYSIS AUTO W/SCOPE: CPT

## 2021-11-02 PROCEDURE — 85025 COMPLETE CBC W/AUTO DIFF WBC: CPT

## 2021-11-02 PROCEDURE — 6360000002 HC RX W HCPCS: Performed by: EMERGENCY MEDICINE

## 2021-11-02 RX ORDER — 0.9 % SODIUM CHLORIDE 0.9 %
1000 INTRAVENOUS SOLUTION INTRAVENOUS ONCE
Status: COMPLETED | OUTPATIENT
Start: 2021-11-02 | End: 2021-11-02

## 2021-11-02 RX ORDER — METOCLOPRAMIDE 10 MG/1
10 TABLET ORAL EVERY 6 HOURS PRN
Qty: 20 TABLET | Refills: 0 | Status: SHIPPED | OUTPATIENT
Start: 2021-11-02 | End: 2021-11-02 | Stop reason: SDUPTHER

## 2021-11-02 RX ORDER — METOCLOPRAMIDE HYDROCHLORIDE 5 MG/ML
10 INJECTION INTRAMUSCULAR; INTRAVENOUS ONCE
Status: COMPLETED | OUTPATIENT
Start: 2021-11-02 | End: 2021-11-02

## 2021-11-02 RX ORDER — CEPHALEXIN 500 MG/1
500 CAPSULE ORAL 2 TIMES DAILY
Qty: 14 CAPSULE | Refills: 0 | Status: SHIPPED | OUTPATIENT
Start: 2021-11-02 | End: 2021-11-09

## 2021-11-02 RX ORDER — METOCLOPRAMIDE 10 MG/1
10 TABLET ORAL EVERY 6 HOURS PRN
Qty: 20 TABLET | Refills: 0 | Status: SHIPPED | OUTPATIENT
Start: 2021-11-02 | End: 2021-12-16 | Stop reason: ALTCHOICE

## 2021-11-02 RX ADMIN — METOCLOPRAMIDE 10 MG: 5 INJECTION, SOLUTION INTRAMUSCULAR; INTRAVENOUS at 09:59

## 2021-11-02 RX ADMIN — SODIUM CHLORIDE 1000 ML: 9 INJECTION, SOLUTION INTRAVENOUS at 09:59

## 2021-11-02 ASSESSMENT — ENCOUNTER SYMPTOMS
BACK PAIN: 0
SHORTNESS OF BREATH: 0
DIARRHEA: 1
VOMITING: 1
COLOR CHANGE: 0
NAUSEA: 1
EYE PAIN: 0
ABDOMINAL PAIN: 0

## 2021-11-02 ASSESSMENT — PAIN SCALES - GENERAL: PAINLEVEL_OUTOF10: 6

## 2021-11-02 NOTE — ED TRIAGE NOTES
Mode of arrival (squad #, walk in, police, etc) : walk in        Chief complaint(s): Abd pain radiating to back, vomiting, diarrhea        Arrival Note (brief scenario, treatment PTA, etc). : 13 weeks pregnant patient reporting abd pain radiating to back, vomiting, and diarrhea since this AM, denies any vaginal bleeding or dysuria. Pt A&Ox4, NAD, respirations even and unlabored with no increased WOB or SOB, ambulatory with steady gait        C= \"Have you ever felt that you should Cut down on your drinking? \"  No  A= \"Have people Annoyed you by criticizing your drinking? \"  No  G= \"Have you ever felt bad or Guilty about your drinking? \"  No  E= \"Have you ever had a drink as an Eye-opener first thing in the morning to steady your nerves or to help a hangover? \"  No      Deferred []      Reason for deferring: N/A    *If yes to two or more: probable alcohol abuse. *

## 2021-11-02 NOTE — LETTER
Agustin Sutherland was seen and treated in our emergency department on 11/2/2021. She may return to work on 11/3/21. If you have any questions or concerns, please don't hesitate to call.

## 2021-11-02 NOTE — ED PROVIDER NOTES
EMERGENCY DEPARTMENT ENCOUNTER    Pt Name: Riddhi Edwards  MRN: [id number]  [place] 1990  Date of evaluation: 11/2/21  CHIEF COMPLAINT       Chief Complaint   Patient presents with    Abdominal Pain     ~13 weeks pregnant. Radiating to back since this AM    Emesis    Diarrhea     HISTORY OF PRESENT ILLNESS   31year-old G3, P2 female at approximately 15 weeks presents for complaint of abdominal cramping, nausea, vomiting and diarrhea. Patient reports that she woke up this morning with nausea and had an episode of vomiting and then proceeded to have one episode of diarrhea as well. Patient reports that she woke up again later today and was still having associated nausea with some vomiting and presented for evaluation. Patient also complaining of epigastric and generalized abdominal cramping. Patient currently denies any issues with the pregnancy, denies any vaginal bleeding vaginal discharge, denies any pelvic pain or cramping, states she did eat out at a restaurant yesterday. Denies any associated fevers, dysuria, hematuria. The history is provided by the patient. REVIEW OF SYSTEMS     Review of Systems   Constitutional: Negative for fever. HENT: Negative for congestion and ear pain. Eyes: Negative for pain. Respiratory: Negative for shortness of breath. Cardiovascular: Negative for chest pain, palpitations and leg swelling. Gastrointestinal: Positive for diarrhea, nausea and vomiting. Negative for abdominal pain. Genitourinary: Negative for dysuria and flank pain. Musculoskeletal: Negative for back pain. Skin: Negative for color change. Neurological: Negative for numbness and headaches. Psychiatric/Behavioral: Negative for confusion. All other systems reviewed and are negative.     PASTMEDICAL HISTORY     Past Medical History:   Diagnosis Date    Anemia     Depression     TSH deficiency 1/16/2019     Past Problem List  Patient Active Problem List   Diagnosis Code  Mood disorder (HCC) F39    Cannabis abuse F12.10    Rh+/RI/GBS neg O09.92    Depression F32. A     19 F Apg  Wt 7#10 Z39.2    Subchorionic hematoma, antepartum O41.8X90, O46.8X9    Third pregnancy Z34.90     SURGICAL HISTORY     No past surgical history on file. CURRENT MEDICATIONS       Discharge Medication List as of 2021 12:03 PM      CONTINUE these medications which have NOT CHANGED    Details   pyridoxine (RA VITAMIN B-6) 50 MG tablet Take 1 tablet by mouth 2 times daily, Disp-30 tablet, R-0Normal      doxyLAMINE succinate (GNP SLEEP AID) 25 MG tablet Take 1 tablet by mouth nightly, Disp-20 tablet, R-0Normal      Prenatal Vit-Fe Fumarate-FA (PRENATAL VITAMIN) 27-0.8 MG TABS Take 1 tablet by mouth daily, Disp-30 tablet, R-12Normal      ondansetron (ZOFRAN-ODT) 4 MG disintegrating tablet Take 1 tablet by mouth 3 times daily as needed for Nausea or Vomiting, Disp-21 tablet, R-0Print           ALLERGIES     has No Known Allergies. FAMILY HISTORY     She indicated that her mother is alive. She indicated that her father is alive. She indicated that her brother is alive. SOCIAL HISTORY       Social History     Tobacco Use    Smoking status: Never Smoker    Smokeless tobacco: Never Used   Vaping Use    Vaping Use: Never used   Substance Use Topics    Alcohol use: Not Currently     Alcohol/week: 0.0 standard drinks     Comment: Social    Drug use: Not Currently     Types: Marijuana (Weed)     Comment: occ     PHYSICAL EXAM     INITIAL VITALS: BP (!) 107/52   Pulse 89   Temp 97.9 °F (36.6 °C) (Oral)   Resp 18   SpO2 100%    Physical Exam  Vitals and nursing note reviewed. Constitutional:       General: She is not in acute distress. Appearance: Normal appearance. She is not toxic-appearing. HENT:      Head: Normocephalic and atraumatic. Nose: Nose normal.      Mouth/Throat:      Mouth: Mucous membranes are moist.      Pharynx: Oropharynx is clear.    Eyes: return to the ED for any new, worsening, changing or persistent symptoms. This dictation was prepared using [employer] voice recognition software. CRITICAL CARE:       PROCEDURES:    Procedures    DIAGNOSTIC RESULTS   EKG:All EKG's are interpreted by the Emergency Department Physician who either signs or Co-signs this chart in the absence of a cardiologist.        RADIOLOGY:All plain film, CT, MRI, and formal ultrasound images (except ED bedside ultrasound) are read by the radiologist, see reports below, unless otherwisenoted in MDM or here. No orders to display     LABS: All lab results were reviewed by myself, and all abnormals are listed below.   Labs Reviewed   CBC WITH AUTO DIFFERENTIAL - Abnormal; Notable for the following components:       Result Value    WBC 13.0 (*)     Seg Neutrophils 84 (*)     Lymphocytes 11 (*)     Segs Absolute 11.00 (*)     All other components within normal limits   COMPREHENSIVE METABOLIC PANEL W/ REFLEX TO MG FOR LOW K - Abnormal; Notable for the following components:    Sodium 132 (*)     CO2 19 (*)     All other components within normal limits   URINE RT REFLEX TO CULTURE - Abnormal; Notable for the following components:    Turbidity UA Cloudy (*)     Urine Hgb TRACE (*)     All other components within normal limits   MICROSCOPIC URINALYSIS - Abnormal; Notable for the following components:    Bacteria, UA MODERATE (*)     All other components within normal limits   LIPASE       EMERGENCY DEPARTMENTCOURSE:         Vitals:    Vitals:    11/02/21 0915   BP: (!) 107/52   Pulse: 89   Resp: 18   Temp: 97.9 °F (36.6 °C)   TempSrc: Oral   SpO2: 100%       The patient was given the following medications while in the emergency department:  Orders Placed This Encounter   Medications    0.9 % sodium chloride bolus    metoclopramide (REGLAN) injection 10 mg    DISCONTD: metoclopramide (REGLAN) 10 MG tablet     Sig: Take 1 tablet by mouth every 6 hours as needed (Nausea) Dispense:  20 tablet     Refill:  0    cephALEXin (KEFLEX) 500 MG capsule     Sig: Take 1 capsule by mouth 2 times daily for 7 days     Dispense:  14 capsule     Refill:  0    metoclopramide (REGLAN) 10 MG tablet     Sig: Take 1 tablet by mouth every 6 hours as needed (Nausea)     Dispense:  20 tablet     Refill:  0     CONSULTS:  None    FINAL IMPRESSION      1. Nausea and vomiting in pregnancy    2. Asymptomatic bacteriuria during pregnancy          DISPOSITION/PLAN   DISPOSITION Decision To Discharge 11/02/2021 11:50:09 AM      PATIENT REFERRED TO:  Clarita Santo MD  [address] [facility]  [address] [facility],[address]  [address] [facility]  [phone]    Schedule an appointment as soon as possible for a visit       [facility] ED  [facility] [address]  [phone]    As needed, If symptoms worsen    HALEY Simon - APURVA  [address] [facility].  [facility] [address] [facility] [address]  [phone]    Schedule an appointment as soon as possible for a visit       DISCHARGE MEDICATIONS:  Discharge Medication List as of 11/2/2021 12:03 PM      START taking these medications    Details   cephALEXin (KEFLEX) 500 MG capsule Take 1 capsule by mouth 2 times daily for 7 days, Disp-14 capsule, R-0Print           The care is provided during an unprecedented national emergency due to the novel coronavirus, COVID 19.   Nikita Taveras DO  Attending Emergency Physician                  Nikita Taveras DO  11/02/21 [id number]

## 2021-11-23 ENCOUNTER — ROUTINE PRENATAL (OUTPATIENT)
Dept: OBGYN CLINIC | Age: 31
End: 2021-11-23
Payer: COMMERCIAL

## 2021-11-23 VITALS
BODY MASS INDEX: 32.81 KG/M2 | WEIGHT: 168 LBS | SYSTOLIC BLOOD PRESSURE: 118 MMHG | HEART RATE: 80 BPM | DIASTOLIC BLOOD PRESSURE: 58 MMHG

## 2021-11-23 DIAGNOSIS — Z34.90 THIRD PREGNANCY: ICD-10-CM

## 2021-11-23 DIAGNOSIS — O41.8X90 SUBCHORIONIC HEMATOMA, ANTEPARTUM, SINGLE OR UNSPECIFIED FETUS: ICD-10-CM

## 2021-11-23 DIAGNOSIS — O46.8X9 SUBCHORIONIC HEMATOMA, ANTEPARTUM, SINGLE OR UNSPECIFIED FETUS: ICD-10-CM

## 2021-11-23 DIAGNOSIS — Z3A.19 19 WEEKS GESTATION OF PREGNANCY: Primary | ICD-10-CM

## 2021-11-23 PROCEDURE — G8484 FLU IMMUNIZE NO ADMIN: HCPCS | Performed by: NURSE PRACTITIONER

## 2021-11-23 PROCEDURE — G8419 CALC BMI OUT NRM PARAM NOF/U: HCPCS | Performed by: NURSE PRACTITIONER

## 2021-11-23 PROCEDURE — 99213 OFFICE O/P EST LOW 20 MIN: CPT | Performed by: NURSE PRACTITIONER

## 2021-11-23 PROCEDURE — G8427 DOCREV CUR MEDS BY ELIG CLIN: HCPCS | Performed by: NURSE PRACTITIONER

## 2021-11-23 PROCEDURE — 1036F TOBACCO NON-USER: CPT | Performed by: NURSE PRACTITIONER

## 2021-11-23 NOTE — PROGRESS NOTES
Avtar Ward is a 31 y.o. female 19w3d        OB History    Para Term  AB Living   3 2 2 0 0 2   SAB IAB Ectopic Molar Multiple Live Births           0 2      # Outcome Date GA Lbr Kevin/2nd Weight Sex Delivery Anes PTL Lv   3 Current            2 Term 19 38w6d 02:07 / 00:05 7 lb 10.8 oz (3.48 kg) F Vag-Spont None N JOAN      Complications: Meconium at birth   1 Term 2014 38w1d  6 lb 14 oz (3.118 kg) F Vag-Spont   JOAN       Vitals  BP: (!) 118/58  Weight: 168 lb (76.2 kg)  Pulse: 80  Patient Position: Sitting  Albumin: Negative  Glucose: Negative    The patient was seen and evaluated. There was negative fetal movements. No contractions or leakage of fluid. Signs and symptoms of  labor as well as labor were reviewed. The Nuchal Translucency testing was reviewed and found to be normal A single marker MSAFP was reviewed and found to be not completed yet. The patients anatomy ultrasound has been completed and reviewed with patient. TOP ST OH Reviewed. A 28 week lab panel was ordered. This includes a (HH, 1 hr GTT, U/A C&S). The patient is to complete this in the next two to four weeks. The S/S of Pre-Eclampsia were reviewed with the patient in detail. She is to report any of these if they occur. She currently denies any of these. The patient is RH positive Rhogam Ordered no    The patient was instructed on fetal kick counts and was given a kick sheet to complete every 8 hours. This is to begin at 28 weeks gestation. She was instructed that the baby should move at a minimum of ten times within one hour after a meal. The patient was instructed to lay down on her left side twenty minutes after eating and count movements for up to one hour with a target value of ten movements. She was instructed to notify the office if she did not make that target after two attempts or if after any attempt there was less than four movements.       10/1/21 will need flagyl after 15 weeks gestation. 21  [facility]      Assessment:  Ray Fung is a 31 y.o. female  2. [id number]  3. 19w3d    Patient Active Problem List    Diagnosis Date Noted    Third pregnancy 10/25/2021     Priority: High    Subchorionic hematoma, antepartum 2021     Priority: High     Pelvic rest/noheavy lifting       Mood disorder ([facility] [address] 2016     Priority: High     19 F Apg 8/9 Wt 7#10 2019    Depression     Rh+/RI/GBS neg 2019    Cannabis abuse 2016        Diagnosis Orders   1. 19 weeks gestation of pregnancy     2. Subchorionic hematoma, antepartum, single or unspecified fetus     3. Third pregnancy           Plan:  The patient will return to the office for her next visit in 4 weeks. See antepartum flow sheet. Lab slip reprinted for [facility] 2021        Patient was seen with total face to face time of 20 minutes. More than 50% of this visit was on counseling and education regarding her    Diagnosis Orders   1. 19 weeks gestation of pregnancy     2. Subchorionic hematoma, antepartum, single or unspecified fetus     3. Third pregnancy      and her options. She was also counseled on her preventative health maintenance recommendations and follow-up.

## 2021-11-30 ENCOUNTER — ROUTINE PRENATAL (OUTPATIENT)
Dept: PERINATAL CARE | Age: 31
End: 2021-11-30
Payer: COMMERCIAL

## 2021-11-30 VITALS
SYSTOLIC BLOOD PRESSURE: 120 MMHG | BODY MASS INDEX: 33.38 KG/M2 | TEMPERATURE: 97.1 F | WEIGHT: 170 LBS | HEIGHT: 60 IN | DIASTOLIC BLOOD PRESSURE: 66 MMHG | RESPIRATION RATE: 16 BRPM | HEART RATE: 70 BPM

## 2021-11-30 DIAGNOSIS — Z3A.20 20 WEEKS GESTATION OF PREGNANCY: ICD-10-CM

## 2021-11-30 DIAGNOSIS — O99.212 OBESITY AFFECTING PREGNANCY IN SECOND TRIMESTER: Primary | ICD-10-CM

## 2021-11-30 DIAGNOSIS — Z36.86 ENCOUNTER FOR SCREENING FOR RISK OF PRE-TERM LABOR: ICD-10-CM

## 2021-11-30 LAB
ABDOMINAL CIRCUMFERENCE: NORMAL
ABDOMINAL CIRCUMFERENCE: NORMAL
BIPARIETAL DIAMETER: NORMAL
BIPARIETAL DIAMETER: NORMAL
ESTIMATED FETAL WEIGHT: NORMAL
ESTIMATED FETAL WEIGHT: NORMAL
FEMORAL DIAMETER: NORMAL
FEMORAL DIAMETER: NORMAL
HC/AC: NORMAL
HC/AC: NORMAL
HEAD CIRCUMFERENCE: NORMAL
HEAD CIRCUMFERENCE: NORMAL

## 2021-11-30 PROCEDURE — 76817 TRANSVAGINAL US OBSTETRIC: CPT | Performed by: OBSTETRICS & GYNECOLOGY

## 2021-11-30 PROCEDURE — 76811 OB US DETAILED SNGL FETUS: CPT | Performed by: OBSTETRICS & GYNECOLOGY

## 2021-12-16 ENCOUNTER — ROUTINE PRENATAL (OUTPATIENT)
Dept: OBGYN CLINIC | Age: 31
End: 2021-12-16
Payer: COMMERCIAL

## 2021-12-16 VITALS
HEART RATE: 86 BPM | WEIGHT: 171 LBS | DIASTOLIC BLOOD PRESSURE: 70 MMHG | SYSTOLIC BLOOD PRESSURE: 114 MMHG | BODY MASS INDEX: 33.4 KG/M2

## 2021-12-16 DIAGNOSIS — O41.8X90 SUBCHORIONIC HEMATOMA, ANTEPARTUM, SINGLE OR UNSPECIFIED FETUS: Primary | ICD-10-CM

## 2021-12-16 DIAGNOSIS — Z3A.22 22 WEEKS GESTATION OF PREGNANCY: ICD-10-CM

## 2021-12-16 DIAGNOSIS — Z34.90 THIRD PREGNANCY: ICD-10-CM

## 2021-12-16 DIAGNOSIS — O46.8X9 SUBCHORIONIC HEMATOMA, ANTEPARTUM, SINGLE OR UNSPECIFIED FETUS: Primary | ICD-10-CM

## 2021-12-16 PROCEDURE — 99213 OFFICE O/P EST LOW 20 MIN: CPT | Performed by: NURSE PRACTITIONER

## 2021-12-16 PROCEDURE — 1036F TOBACCO NON-USER: CPT | Performed by: NURSE PRACTITIONER

## 2021-12-16 PROCEDURE — G8427 DOCREV CUR MEDS BY ELIG CLIN: HCPCS | Performed by: NURSE PRACTITIONER

## 2021-12-16 PROCEDURE — G8419 CALC BMI OUT NRM PARAM NOF/U: HCPCS | Performed by: NURSE PRACTITIONER

## 2021-12-16 PROCEDURE — G8484 FLU IMMUNIZE NO ADMIN: HCPCS | Performed by: NURSE PRACTITIONER

## 2021-12-16 NOTE — PROGRESS NOTES
J Luis Marcelo is a 36 y.o. female 20w7d        OB History    Para Term  AB Living   3 2 2 0 0 2   SAB IAB Ectopic Molar Multiple Live Births           0 2      # Outcome Date GA Lbr Kevin/2nd Weight Sex Delivery Anes PTL Lv   3 Current            2 Term 19 38w6d 02:07 / 00:05 7 lb 10.8 oz (3.48 kg) F Vag-Spont None N JOAN      Complications: Meconium at birth   1 Term 2014 38w1d  6 lb 14 oz (3.118 kg) F Vag-Spont   JAON       Vitals  BP: 114/70  Weight: 171 lb (77.6 kg)  Pulse: 86  Patient Position: Sitting  Albumin: Negative  Glucose: Negative    The patient was seen and evaluated. There was positive fetal movements. No contractions or leakage of fluid. Signs and symptoms of  labor as well as labor were reviewed. The Nuchal Translucency testing was reviewed and found to be normal A single marker MSAFP was reviewed and found to be did not complete . The patients anatomy ultrasound has been completed and reviewed with patient. TOP ST OH Reviewed. A 28 week lab panel was ordered. This includes a (HH, 1 hr GTT, U/A C&S). The patient is to complete this in the next two to four weeks. The S/S of Pre-Eclampsia were reviewed with the patient in detail. She is to report any of these if they occur. She currently denies any of these. The patient is RH positive Rhogam Ordered no    The patient was instructed on fetal kick counts and was given a kick sheet to complete every 8 hours. This is to begin at 28 weeks gestation. She was instructed that the baby should move at a minimum of ten times within one hour after a meal. The patient was instructed to lay down on her left side twenty minutes after eating and count movements for up to one hour with a target value of ten movements. She was instructed to notify the office if she did not make that target after two attempts or if after any attempt there was less than four movements.       10/1/21 will need flagyl after 15 weeks gestation. 21  PRAF completed      Assessment:  1. Phuong Perez is a 36 y.o. female  2. [id number]  3. 22w5d    Patient Active Problem List    Diagnosis Date Noted    Third pregnancy 10/25/2021     Priority: High    Subchorionic hematoma, antepartum 2021     Priority: High     Pelvic rest/noheavy lifting       Mood disorder ([facility] [address] 2016     Priority: High     19 F Apg / Wt 7#10 2019    Depression     Rh+/RI/GBS neg 2019    Cannabis abuse 2016        Diagnosis Orders   1. Subchorionic hematoma, antepartum, single or unspecified fetus     2. Third pregnancy     3. 22 weeks gestation of pregnancy           Plan:  The patient will return to the office for her next visit in 4 weeks. See antepartum flow sheet. Patient was seen with total face to face time of 15 minutes. More than 50% of this visit was on counseling and education regarding her    Diagnosis Orders   1. Subchorionic hematoma, antepartum, single or unspecified fetus     2. Third pregnancy     3. 22 weeks gestation of pregnancy      and her options. She was also counseled on her preventative health maintenance recommendations and follow-up.

## 2021-12-30 ENCOUNTER — ROUTINE PRENATAL (OUTPATIENT)
Dept: OBGYN CLINIC | Age: 31
End: 2021-12-30
Payer: COMMERCIAL

## 2021-12-30 ENCOUNTER — HOSPITAL ENCOUNTER (OUTPATIENT)
Age: 31
Setting detail: SPECIMEN
Discharge: HOME | End: 2021-12-30

## 2021-12-30 VITALS
DIASTOLIC BLOOD PRESSURE: 78 MMHG | WEIGHT: 177 LBS | BODY MASS INDEX: 34.57 KG/M2 | HEART RATE: 80 BPM | SYSTOLIC BLOOD PRESSURE: 118 MMHG

## 2021-12-30 DIAGNOSIS — F12.10 CANNABIS ABUSE: ICD-10-CM

## 2021-12-30 DIAGNOSIS — N76.0 ACUTE VAGINITIS: Primary | ICD-10-CM

## 2021-12-30 DIAGNOSIS — Z34.90 THIRD PREGNANCY: ICD-10-CM

## 2021-12-30 DIAGNOSIS — O41.8X90 SUBCHORIONIC HEMATOMA, ANTEPARTUM, SINGLE OR UNSPECIFIED FETUS: ICD-10-CM

## 2021-12-30 DIAGNOSIS — N76.0 ACUTE VAGINITIS: ICD-10-CM

## 2021-12-30 DIAGNOSIS — Z3A.24 24 WEEKS GESTATION OF PREGNANCY: ICD-10-CM

## 2021-12-30 DIAGNOSIS — O46.8X9 SUBCHORIONIC HEMATOMA, ANTEPARTUM, SINGLE OR UNSPECIFIED FETUS: ICD-10-CM

## 2021-12-30 LAB
DIRECT EXAM: ABNORMAL
Lab: ABNORMAL
SPECIMEN DESCRIPTION: ABNORMAL

## 2021-12-30 PROCEDURE — G8484 FLU IMMUNIZE NO ADMIN: HCPCS | Performed by: NURSE PRACTITIONER

## 2021-12-30 PROCEDURE — 99213 OFFICE O/P EST LOW 20 MIN: CPT | Performed by: NURSE PRACTITIONER

## 2021-12-30 PROCEDURE — G8419 CALC BMI OUT NRM PARAM NOF/U: HCPCS | Performed by: NURSE PRACTITIONER

## 2021-12-30 PROCEDURE — 1036F TOBACCO NON-USER: CPT | Performed by: NURSE PRACTITIONER

## 2021-12-30 PROCEDURE — G8427 DOCREV CUR MEDS BY ELIG CLIN: HCPCS | Performed by: NURSE PRACTITIONER

## 2021-12-30 NOTE — PROGRESS NOTES
C/p vaginal discharge, odor and burning x 2-3 days. Yasmeen Mullen is a 36 y.o. female 19w9d        OB History    Para Term  AB Living   3 2 2 0 0 2   SAB IAB Ectopic Molar Multiple Live Births           0 2      # Outcome Date GA Lbr Kevin/2nd Weight Sex Delivery Anes PTL Lv   3 Current            2 Term 19 38w6d 02:07 / 00:05 7 lb 10.8 oz (3.48 kg) F Vag-Spont None N JOAN      Complications: Meconium at birth   1 Term 2014 38w1d  6 lb 14 oz (3.118 kg) F Vag-Spont   JOAN       Vitals  BP: 118/78  Weight: 177 lb (80.3 kg)  Pulse: 80  Patient Position: Sitting  Albumin: Negative  Glucose: Negative  Fundal Height (cm): 25 cm  Fetal Heart Rate: 155  Movement: Present    The patient was seen and evaluated. There was positive fetal movements. No contractions or leakage of fluid. Signs and symptoms of  labor as well as labor were reviewed. The Nuchal Translucency testing was reviewed and found to be normal A single marker MSAFP was reviewed and found to be normal. The patients anatomy ultrasound has been completed and reviewed with patient. TOP ST OH Reviewed. A 28 week lab panel was ordered. This includes a (HH, 1 hr GTT, U/A C&S). The patient is to complete this in the next two to four weeks. The S/S of Pre-Eclampsia were reviewed with the patient in detail. She is to report any of these if they occur. She currently denies any of these. The patient is RH positive Rhogam Ordered no    The patient was instructed on fetal kick counts and was given a kick sheet to complete every 8 hours. This is to begin at 28 weeks gestation. She was instructed that the baby should move at a minimum of ten times within one hour after a meal. The patient was instructed to lay down on her left side twenty minutes after eating and count movements for up to one hour with a target value of ten movements.   She was instructed to notify the office if she did not make that target after two attempts or if after any attempt there was less than four movements. 10/1/21 will need flagyl after 15 weeks gestation. 21  PRAF completed      Assessment:  1. Riddhi Edwards is a 36 y.o. female  2. [id number]  3. 24w5d    Patient Active Problem List    Diagnosis Date Noted    Third pregnancy 10/25/2021     Priority: High    Subchorionic hematoma, antepartum 2021     Priority: High     Pelvic rest/noheavy lifting       Mood disorder ([facility] [address] 2016     Priority: High     19 F Apg 8/9 Wt 7#10 2019    Depression     Rh+/RI/GBS neg 2019    Cannabis abuse 2016        Diagnosis Orders   1. Acute vaginitis  C.trachomatis N.gonorrhoeae DNA    VAGINITIS DNA PROBE   2. Third pregnancy     3. Subchorionic hematoma, antepartum, single or unspecified fetus     4. 24 weeks gestation of pregnancy  Glucose tolerance, 1 hour    CBC Auto Differential    Urinalysis Reflex to Culture   5. Cannabis abuse           Plan:  The patient will return to the office for her next visit in 4 weeks. See antepartum flow sheet. Lab slip given  Vaginal cultures collected   Reviewed s/sx of  labor and preeclampsia  [facility] @ 28 weeks           Patient was seen with total face to face time of 15 minutes. More than 50% of this visit was on counseling and education regarding her    Diagnosis Orders   1. Acute vaginitis  C.trachomatis N.gonorrhoeae DNA    VAGINITIS DNA PROBE   2. Third pregnancy     3. Subchorionic hematoma, antepartum, single or unspecified fetus     4. 24 weeks gestation of pregnancy  Glucose tolerance, 1 hour    CBC Auto Differential    Urinalysis Reflex to Culture   5. Cannabis abuse      and her options. She was also counseled on her preventative health maintenance recommendations and follow-up.

## 2022-01-03 ENCOUNTER — TELEPHONE (OUTPATIENT)
Dept: OBGYN CLINIC | Age: 32
End: 2022-01-03

## 2022-01-03 NOTE — TELEPHONE ENCOUNTER
----- Message from Pawel Cohen, HALEY - CNP sent at 1/3/2022  7:49 AM EST -----  +candida  OTC monistat if patient is symptomatic

## 2022-01-03 NOTE — TELEPHONE ENCOUNTER
Per Loly patient called in for results.  Chapin Ellis gave results to patient and instructed her to  monistat otc

## 2022-01-11 ENCOUNTER — ROUTINE PRENATAL (OUTPATIENT)
Dept: OBGYN CLINIC | Age: 32
End: 2022-01-11
Payer: COMMERCIAL

## 2022-01-11 VITALS
SYSTOLIC BLOOD PRESSURE: 102 MMHG | WEIGHT: 176 LBS | BODY MASS INDEX: 34.37 KG/M2 | HEART RATE: 97 BPM | DIASTOLIC BLOOD PRESSURE: 58 MMHG

## 2022-01-11 DIAGNOSIS — O09.92 HIGH-RISK PREGNANCY IN SECOND TRIMESTER: Primary | ICD-10-CM

## 2022-01-11 DIAGNOSIS — Z3A.26 26 WEEKS GESTATION OF PREGNANCY: ICD-10-CM

## 2022-01-11 DIAGNOSIS — Z34.90 THIRD PREGNANCY: ICD-10-CM

## 2022-01-11 DIAGNOSIS — O41.8X90 SUBCHORIONIC HEMATOMA, ANTEPARTUM, SINGLE OR UNSPECIFIED FETUS: ICD-10-CM

## 2022-01-11 DIAGNOSIS — O46.8X9 SUBCHORIONIC HEMATOMA, ANTEPARTUM, SINGLE OR UNSPECIFIED FETUS: ICD-10-CM

## 2022-01-11 PROCEDURE — G8427 DOCREV CUR MEDS BY ELIG CLIN: HCPCS | Performed by: OBSTETRICS & GYNECOLOGY

## 2022-01-11 PROCEDURE — G8419 CALC BMI OUT NRM PARAM NOF/U: HCPCS | Performed by: OBSTETRICS & GYNECOLOGY

## 2022-01-11 PROCEDURE — G8484 FLU IMMUNIZE NO ADMIN: HCPCS | Performed by: OBSTETRICS & GYNECOLOGY

## 2022-01-11 PROCEDURE — 1036F TOBACCO NON-USER: CPT | Performed by: OBSTETRICS & GYNECOLOGY

## 2022-01-11 PROCEDURE — 99213 OFFICE O/P EST LOW 20 MIN: CPT | Performed by: OBSTETRICS & GYNECOLOGY

## 2022-01-11 NOTE — PROGRESS NOTES
Deangelo Connor is a 36 y.o. female 26w3d        OB History    Para Term  AB Living   3 2 2 0 0 2   SAB IAB Ectopic Molar Multiple Live Births           0 2      # Outcome Date GA Lbr Kevin/2nd Weight Sex Delivery Anes PTL Lv   3 Current            2 Term 19 38w6d 02:07 / 00:05 7 lb 10.8 oz (3.48 kg) F Vag-Spont None N JOAN      Complications: Meconium at birth   1 Term 2014 38w1d  6 lb 14 oz (3.118 kg) F Vag-Spont   JOAN       Vitals  BP: (!) 102/58  Weight: 176 lb (79.8 kg)  Pulse: 97  Patient Position: Sitting  Albumin: Negative  Glucose: Negative    The patient was seen and evaluated. There was positive fetal movements. No contractions or leakage of fluid. Signs and symptoms of  labor as well as labor were reviewed. The Nuchal Translucency testing was reviewed and found to be normal A single marker MSAFP was reviewed and found to be pt did not do- declined counseling. The patients anatomy ultrasound has been completed and reviewed with patient. TOP ST OH Reviewed. A 28 week lab panel was ordered. This includes a (HH, 1 hr GTT, U/A C&S). The patient is to complete this in the next two to four weeks. The S/S of Pre-Eclampsia were reviewed with the patient in detail. She is to report any of these if they occur. She currently denies any of these. The patient is RH positive Rhogam Ordered no    The patient was instructed on fetal kick counts and was given a kick sheet to complete every 8 hours. This is to begin at 28 weeks gestation. She was instructed that the baby should move at a minimum of ten times within one hour after a meal. The patient was instructed to lay down on her left side twenty minutes after eating and count movements for up to one hour with a target value of ten movements. She was instructed to notify the office if she did not make that target after two attempts or if after any attempt there was less than four movements.       22 Tdap @ 27 wk gest  21  PRAF completed      Assessment:  Ray Connor is a 36 y.o. female  2. [id number]  3. 26w3d    Patient Active Problem List    Diagnosis Date Noted    Third pregnancy 10/25/2021     Priority: High    Subchorionic hematoma, antepartum 2021     Priority: High     Pelvic rest/noheavy lifting       Mood disorder ([facility] [address] 2016     Priority: High     19 F Apg 8/9 Wt 7#10 2019    Depression     Rh+/RI/GBS neg 2019    Cannabis abuse 2016        Diagnosis Orders   1. High-risk pregnancy in second trimester     2. Subchorionic hematoma, antepartum, single or unspecified fetus     3. Third pregnancy     4. 26 weeks gestation of pregnancy           Plan:  The patient will return to the office for her next visit in 3 weeks. See antepartum flow sheet. Patient was seen with total face to face time of 20 minutes. More than 50% of this visit was on counseling and education regarding her    Diagnosis Orders   1. High-risk pregnancy in second trimester     2. Subchorionic hematoma, antepartum, single or unspecified fetus     3. Third pregnancy     4. 26 weeks gestation of pregnancy      and her options. She was also counseled on her preventative health maintenance recommendations and follow-up.

## 2022-01-12 ENCOUNTER — HOSPITAL ENCOUNTER (OUTPATIENT)
Age: 32
Setting detail: OBSERVATION
Discharge: HOME | End: 2022-01-12
Attending: SPECIALIST | Admitting: SPECIALIST
Payer: COMMERCIAL

## 2022-01-12 VITALS — HEART RATE: 79 BPM | DIASTOLIC BLOOD PRESSURE: 58 MMHG | SYSTOLIC BLOOD PRESSURE: 116 MMHG | TEMPERATURE: 98.5 F

## 2022-01-12 PROBLEM — R10.2 PELVIC PAIN: Status: ACTIVE | Noted: 2022-01-12

## 2022-01-12 LAB
BILIRUBIN URINE: NEGATIVE
CANDIDA SPECIES, DNA PROBE: NEGATIVE
COLOR: YELLOW
COMMENT UA: NORMAL
GARDNERELLA VAGINALIS, DNA PROBE: NEGATIVE
GLUCOSE URINE: NEGATIVE
KETONES, URINE: NEGATIVE
LEUKOCYTE ESTERASE, URINE: NEGATIVE
NITRITE, URINE: NEGATIVE
PH UA: 6.5 (ref 5–8)
PROTEIN UA: NEGATIVE
SOURCE: NORMAL
SPECIFIC GRAVITY UA: 1.01 (ref 1–1.03)
TRICHOMONAS VAGINALIS DNA: NEGATIVE
TURBIDITY: CLEAR
URINE HGB: NEGATIVE
UROBILINOGEN, URINE: NORMAL

## 2022-01-12 PROCEDURE — 87480 CANDIDA DNA DIR PROBE: CPT

## 2022-01-12 PROCEDURE — 81003 URINALYSIS AUTO W/O SCOPE: CPT

## 2022-01-12 PROCEDURE — 6370000000 HC RX 637 (ALT 250 FOR IP)

## 2022-01-12 PROCEDURE — 87660 TRICHOMONAS VAGIN DIR PROBE: CPT

## 2022-01-12 PROCEDURE — G0378 HOSPITAL OBSERVATION PER HR: HCPCS

## 2022-01-12 PROCEDURE — 87591 N.GONORRHOEAE DNA AMP PROB: CPT

## 2022-01-12 PROCEDURE — 87510 GARDNER VAG DNA DIR PROBE: CPT

## 2022-01-12 PROCEDURE — 87491 CHLMYD TRACH DNA AMP PROBE: CPT

## 2022-01-12 RX ORDER — ACETAMINOPHEN 500 MG
1000 TABLET ORAL ONCE
Status: COMPLETED | OUTPATIENT
Start: 2022-01-12 | End: 2022-01-12

## 2022-01-12 RX ADMIN — ACETAMINOPHEN 1000 MG: 500 TABLET ORAL at 21:21

## 2022-01-12 ASSESSMENT — PAIN SCALES - GENERAL: PAINLEVEL_OUTOF10: 6

## 2022-01-12 ASSESSMENT — ENCOUNTER SYMPTOMS
VOMITING: 0
BACK PAIN: 1
DIARRHEA: 0
SHORTNESS OF BREATH: 0
CONSTIPATION: 0
NAUSEA: 0
COLOR CHANGE: 0
ABDOMINAL PAIN: 0
CHEST TIGHTNESS: 0
COUGH: 0
WHEEZING: 0
ABDOMINAL DISTENTION: 0

## 2022-01-12 NOTE — LETTER
[address]  Phone: [phone]    No name on file. January 12, 2022     Patient: Mary Ellen Leon   YOB: 1990   Date of Visit: 1/12/2022       To Whom It May Concern: It is my medical opinion that Ronal Quiroz may return to work on 1/17/22. If you have any questions or concerns, please don't hesitate to call.     Sincerely,        Mortimer Bangs, APRN-CNM

## 2022-01-13 LAB
C. TRACHOMATIS DNA ,URINE: NEGATIVE
N. GONORRHOEAE DNA, URINE: NEGATIVE
SPECIMEN DESCRIPTION: NORMAL

## 2022-01-13 NOTE — FLOWSHEET NOTE
Patient admitted to room 168 from ED per wheelchair. Here with c/o lower back pain. Denies ROM or vaginal bleeding. Denies N/V/D. Denies fever/chills. Relates of + fetal movement. Request for urine sample made. Urine drug screen explained to patient. Instructed on clean catch urine. Consent obtained. Patient verbalizes understanding and acceptance. Assisted into bed, Siderails up x2. Call light in reach. Bed in low position. Oriented to room, surroundings, call system and plan of care. Patient verbalizes understanding. EFM applied and monitor test completed/passed.

## 2022-01-13 NOTE — H&P
[address] and Delivery Triage Note   2022  7:34 PM      SUBJECTIVE:  CHIEF COMPLAINT:  Back pain and pelvic pain    HISTORY OF PRESENT ILLNESS:      .Gt Rosas is a 36 y.o. female   At 30w1d    Patient presents for evaluation of back pain that started around noon today after she was working and this pain wraps around to the front in her pelvis. She reports the pain is constant and is a sharp pain. She reports the pain is worse when she is up moving and walking. She reports she has not taken anything for this pain. She declines vaginal discharge or irritation. She declines any burning or pain with urination. She reports good fetal movement and declines contractions, leaking of fluid, and vaginal bleeding. .    OBJECTIVE:  ESTIMATED DUE DATE:    Estimated Date of Delivery: 22  No LMP recorded. Patient is pregnant. PRENATAL CARE:  Complicated by:    Patient Active Problem List   Diagnosis    Mood disorder ([facility] [address]    Cannabis abuse    Rh+/RI/GBS neg    Depression     19 F Apg 8/9 Wt 7#10    Subchorionic hematoma, antepartum    Third pregnancy         PAST MEDICAL HISTORY:     Past Medical History:   Diagnosis Date    Anemia     Depression     TSH deficiency 2019       PAST OB HISTORY:  OB History    Para Term  AB Living   3 2 2 0 0 2   SAB IAB Ectopic Molar Multiple Live Births   0 0 0 0 0 2      # Outcome Date GA Lbr Kevin/2nd Weight Sex Delivery Anes PTL Lv   3 Current            2 Term 19 38w6d 02:07 / 00:05 7 lb 10.8 oz (3.48 kg) F Vag-Spont None N JOAN      Complications: Meconium at birth      Name: Genia Guadarrama: 8  Apgar5: 9   1 Term 2014 38w1d  6 lb 14 oz (3.118 kg) F Vag-Spont   JOAN       PAST  SURGICAL HISTORY:   No past surgical history on file. SOCIAL HISTORY:   reports that she has never smoked. She has never used smokeless tobacco. She reports previous alcohol use.  She reports previous drug use. Drug: Marijuana Matthew Johnson). MEDICATIONS:  Prior to Admission medications    Medication Sig Start Date End Date Taking? Authorizing Provider   Prenatal Vit-Fe Fumarate-FA (PRENATAL VITAMIN) 27-0.8 MG TABS Take 1 tablet by mouth daily 21  Yes HALEY Higgins - CNP        PRENATAL CARE:  Complicated by:    Patient Active Problem List   Diagnosis    Mood disorder ([facility] [address]    Cannabis abuse    Rh+/RI/GBS neg    Depression     19 F Apg  Wt 7#10    Subchorionic hematoma, antepartum    Third pregnancy       REVIEW OF SYSTEMS:   Review of Systems   Constitutional: Negative for activity change, chills, diaphoresis, fatigue and fever. HENT: Negative for congestion. Respiratory: Negative for cough, chest tightness, shortness of breath and wheezing. Cardiovascular: Negative for chest pain, palpitations and leg swelling. Gastrointestinal: Negative for abdominal distention, abdominal pain, constipation, diarrhea, nausea and vomiting. Genitourinary: Positive for pelvic pain. Negative for dysuria, frequency, hematuria, urgency, vaginal bleeding, vaginal discharge and vaginal pain. Musculoskeletal: Positive for back pain. Negative for arthralgias. Skin: Negative for color change. Neurological: Negative for dizziness, speech difficulty, weakness, light-headedness, numbness and headaches. Psychiatric/Behavioral: Negative for agitation, behavioral problems, confusion, dysphoric mood, self-injury and suicidal ideas. The patient is not nervous/anxious. PHYSICAL EXAM:    Vital Signs: Blood pressure (!) 116/58, pulse 79, temperature 98.5 °F (36.9 °C), temperature source Oral, not currently breastfeeding.     OB History        3    Para   2    Term   2       0    AB   0    Living   2       SAB        IAB        Ectopic        Molar        Multiple   0    Live Births   2             General appearance: alert, appears stated age and cooperative  Skin: Skin color, texture, turgor normal. No rashes or lesions  HEENT: Head: Normocephalic, no lesions, without obvious abnormality. Lungs: clear to auscultation bilaterally  Heart: regular rate and rhythm, S1, S2 normal, no murmur, click, rub or gallop  Extremities: extremities normal, atraumatic, no cyanosis or edema  Neurologic: Mental status: Alert, oriented, thought content appropriate    Abdomen:  Abdomen soft, non tender, consistent with her EGA. Fetal heart rate:  Baseline Heart Rate 135                               Variability: moderate                               Accelerations:  present                               Declerations: absent    Contractions: none              Membranes:  Intact    Speculum exam:  - cervix visually closed with a small amount of white discharge present. ASSESSMENT/PLAN:    Luther Toussaint is a 36 y.o. female  at 30w1d with pelvic pain. -  Vaginitis probe, GC/CT, and urinalysis ordered and collected. - Tylenol ordered for pain. - Lab results reviewed and were normal.    - Reviewed round ligament pain. Discussed using belly band, epsom salt baths, tylenol as needed, and chiropractic care. - Reviewed warning signs including vaginal bleeding, contractions, leaking of fluid, and decreased fetal movement    - Work note given to patient per request.    - Reviewed keeping appointment with [facility] as scheduled.

## 2022-01-31 ENCOUNTER — HOSPITAL ENCOUNTER (OUTPATIENT)
Age: 32
Discharge: HOME | End: 2022-01-31
Payer: COMMERCIAL

## 2022-01-31 ENCOUNTER — TELEPHONE (OUTPATIENT)
Dept: OBGYN CLINIC | Age: 32
End: 2022-01-31

## 2022-01-31 DIAGNOSIS — R79.89 INCREASED PLATELET COUNT: Primary | ICD-10-CM

## 2022-01-31 DIAGNOSIS — Z3A.24 24 WEEKS GESTATION OF PREGNANCY: ICD-10-CM

## 2022-01-31 LAB
-: ABNORMAL
ABSOLUTE EOS #: 0.1 K/UL (ref 0–0.4)
ABSOLUTE IMMATURE GRANULOCYTE: ABNORMAL K/UL (ref 0–0.3)
ABSOLUTE LYMPH #: 1.3 K/UL (ref 1–4.8)
ABSOLUTE MONO #: 0.9 K/UL (ref 0.1–1.3)
AMORPHOUS: ABNORMAL
BACTERIA: ABNORMAL
BASOPHILS # BLD: 0 % (ref 0–2)
BASOPHILS ABSOLUTE: 0 K/UL (ref 0–0.2)
BILIRUBIN URINE: NEGATIVE
CASTS UA: ABNORMAL /LPF
COLOR: YELLOW
COMMENT UA: ABNORMAL
CRYSTALS, UA: ABNORMAL /HPF
DIFFERENTIAL TYPE: ABNORMAL
EOSINOPHILS RELATIVE PERCENT: 1 % (ref 0–4)
EPITHELIAL CELLS UA: ABNORMAL /HPF
GLUCOSE ADMINISTRATION: NORMAL
GLUCOSE TOLERANCE SCREEN 50G: 108 MG/DL (ref 70–135)
GLUCOSE URINE: NEGATIVE
HCT VFR BLD CALC: 32.2 % (ref 36–46)
HEMOGLOBIN: 10.8 G/DL (ref 12–16)
IMMATURE GRANULOCYTES: ABNORMAL %
KETONES, URINE: NEGATIVE
LEUKOCYTE ESTERASE, URINE: ABNORMAL
LYMPHOCYTES # BLD: 14 % (ref 24–44)
MCH RBC QN AUTO: 28.8 PG (ref 26–34)
MCHC RBC AUTO-ENTMCNC: 33.6 G/DL (ref 31–37)
MCV RBC AUTO: 85.8 FL (ref 80–100)
MONOCYTES # BLD: 10 % (ref 1–7)
MUCUS: ABNORMAL
NITRITE, URINE: NEGATIVE
NRBC AUTOMATED: ABNORMAL PER 100 WBC
OTHER OBSERVATIONS UA: ABNORMAL
PDW BLD-RTO: 13.4 % (ref 11.5–14.9)
PH UA: 6.5 (ref 5–8)
PLATELET # BLD: 481 K/UL (ref 150–450)
PLATELET ESTIMATE: ABNORMAL
PMV BLD AUTO: 6.3 FL (ref 6–12)
PROTEIN UA: NEGATIVE
RBC # BLD: 3.75 M/UL (ref 4–5.2)
RBC # BLD: ABNORMAL 10*6/UL
RBC UA: ABNORMAL /HPF
RENAL EPITHELIAL, UA: ABNORMAL /HPF
SEG NEUTROPHILS: 75 % (ref 36–66)
SEGMENTED NEUTROPHILS ABSOLUTE COUNT: 6.7 K/UL (ref 1.3–9.1)
SPECIFIC GRAVITY UA: 1.02 (ref 1–1.03)
TRICHOMONAS: ABNORMAL
TURBIDITY: ABNORMAL
URINE HGB: NEGATIVE
UROBILINOGEN, URINE: NORMAL
WBC # BLD: 9 K/UL (ref 3.5–11)
WBC # BLD: ABNORMAL 10*3/UL
WBC UA: ABNORMAL /HPF
YEAST: ABNORMAL

## 2022-01-31 PROCEDURE — 36415 COLL VENOUS BLD VENIPUNCTURE: CPT

## 2022-01-31 PROCEDURE — 81001 URINALYSIS AUTO W/SCOPE: CPT

## 2022-01-31 PROCEDURE — 85025 COMPLETE CBC W/AUTO DIFF WBC: CPT

## 2022-01-31 PROCEDURE — 82950 GLUCOSE TEST: CPT

## 2022-01-31 RX ORDER — FERROUS SULFATE 325(65) MG
325 TABLET ORAL 2 TIMES DAILY
Qty: 180 TABLET | Refills: 6 | Status: SHIPPED | OUTPATIENT
Start: 2022-01-31 | End: 2022-07-27 | Stop reason: ALTCHOICE

## 2022-01-31 NOTE — TELEPHONE ENCOUNTER
----- Message from HAELY Bergeron - NP sent at 1/31/2022 11:53 AM EST -----  + anemia  Ferrous sulfate 325mg PO BID with 6 refills   Increased platelets- repeat CBC in 3-4 weeks     Hold UA for C&S

## 2022-02-01 ENCOUNTER — ROUTINE PRENATAL (OUTPATIENT)
Dept: OBGYN CLINIC | Age: 32
End: 2022-02-01
Payer: COMMERCIAL

## 2022-02-01 VITALS
SYSTOLIC BLOOD PRESSURE: 106 MMHG | WEIGHT: 182 LBS | BODY MASS INDEX: 35.54 KG/M2 | DIASTOLIC BLOOD PRESSURE: 74 MMHG | HEART RATE: 110 BPM

## 2022-02-01 DIAGNOSIS — O41.8X90 SUBCHORIONIC HEMATOMA, ANTEPARTUM, SINGLE OR UNSPECIFIED FETUS: ICD-10-CM

## 2022-02-01 DIAGNOSIS — Z34.90 THIRD PREGNANCY: ICD-10-CM

## 2022-02-01 DIAGNOSIS — Z3A.29 29 WEEKS GESTATION OF PREGNANCY: Primary | ICD-10-CM

## 2022-02-01 DIAGNOSIS — O46.8X9 SUBCHORIONIC HEMATOMA, ANTEPARTUM, SINGLE OR UNSPECIFIED FETUS: ICD-10-CM

## 2022-02-01 PROBLEM — O99.013 ANEMIA AFFECTING PREGNANCY IN THIRD TRIMESTER: Status: ACTIVE | Noted: 2022-02-01

## 2022-02-01 PROCEDURE — 99213 OFFICE O/P EST LOW 20 MIN: CPT | Performed by: NURSE PRACTITIONER

## 2022-02-01 PROCEDURE — G8427 DOCREV CUR MEDS BY ELIG CLIN: HCPCS | Performed by: NURSE PRACTITIONER

## 2022-02-01 PROCEDURE — 1036F TOBACCO NON-USER: CPT | Performed by: NURSE PRACTITIONER

## 2022-02-01 PROCEDURE — G8419 CALC BMI OUT NRM PARAM NOF/U: HCPCS | Performed by: NURSE PRACTITIONER

## 2022-02-01 PROCEDURE — G8484 FLU IMMUNIZE NO ADMIN: HCPCS | Performed by: NURSE PRACTITIONER

## 2022-02-01 RX ORDER — IRON/C/B12/FOLATE/ZINC/SUCCIN 75MG-175MG
1 TABLET ORAL DAILY
Qty: 90 TABLET | Refills: 1 | Status: SHIPPED | OUTPATIENT
Start: 2022-02-01 | End: 2022-04-28

## 2022-02-01 NOTE — PROGRESS NOTES
Shauna Alfaro is a 36 y.o. female 28w2d        OB History    Para Term  AB Living   3 2 2 0 0 2   SAB IAB Ectopic Molar Multiple Live Births           0 2      # Outcome Date GA Lbr Kevin/2nd Weight Sex Delivery Anes PTL Lv   3 Current            2 Term 19 38w6d 02:07 / 00:05 7 lb 10.8 oz (3.48 kg) F Vag-Spont None N JOAN      Complications: Meconium at birth   1 Term 2014 38w1d  6 lb 14 oz (3.118 kg) F Vag-Spont   JOAN       Vitals  BP: 106/74  Weight: 182 lb (82.6 kg)  Pulse: 110  Patient Position: Sitting  Albumin: Negative  Glucose: Negative      The patient was seen and evaluated. There was positive fetal movements. No contractions or leakage of fluid. Signs and symptoms of  labor as well as labor were reviewed. The S/S of Pre-Eclampsia were reviewed with the patient in detail. She is to report any of these if they occur. She currently denies any of these. The patient had her 28 week labs completed.   Hospital Outpatient Visit on 2022   Component Date Value Ref Range Status    Color, UA 2022 Yellow  Yellow Final    Turbidity UA 2022 Cloudy* Clear Final    Glucose, Ur 2022 NEGATIVE  NEGATIVE Final    Bilirubin Urine 2022 NEGATIVE  NEGATIVE Final    Ketones, Urine 2022 NEGATIVE  NEGATIVE Final    Specific [place], UA 2022 1.024  1.000 - 1.030 Final    Urine Hgb 2022 NEGATIVE  NEGATIVE Final    pH, UA 2022 6.5  5.0 - 8.0 Final    Protein, UA 2022 NEGATIVE  NEGATIVE Final    Urobilinogen, Urine 2022 Normal  Normal Final    Nitrite, Urine 2022 NEGATIVE  NEGATIVE Final    Leukocyte Esterase, Urine 2022 SMALL* NEGATIVE Final    Urinalysis Comments 2022 NOT REPORTED   Final    - 2022        Final    WBC, UA 2022 5 TO 10  /HPF Final    RBC, UA 2022 0 TO 2  /HPF Final    Casts UA 2022 NOT REPORTED  /LPF Final    Crystals, UA 2022 NOT REPORTED None /HPF Final    Epithelial Cells UA 01/31/2022 20 TO 50  /HPF Final    Renal Epithelial, UA 01/31/2022 NOT REPORTED  0 /HPF Final    Bacteria, UA 01/31/2022 MODERATE* None Final    Mucus, UA 01/31/2022 NOT REPORTED  None Final    Trichomonas, UA 01/31/2022 NOT REPORTED  None Final    Amorphous, UA 01/31/2022 NOT REPORTED  None Final    Other Observations UA 01/31/2022 NOT REPORTED  NOT REQ.  Final    Yeast, UA 01/31/2022 NOT REPORTED  None Final    WBC 01/31/2022 9.0  3.5 - 11.0 k/uL Final    RBC 01/31/2022 3.75* 4.0 - 5.2 m/uL Final    Hemoglobin 01/31/2022 10.8* 12.0 - 16.0 g/dL Final    Hematocrit 01/31/2022 32.2* 36 - 46 % Final    MCV 01/31/2022 85.8  80 - 100 fL Final    MCH 01/31/2022 28.8  26 - 34 pg Final    MCHC 01/31/2022 33.6  31 - 37 g/dL Final    RDW 01/31/2022 13.4  11.5 - 14.9 % Final    Platelets 07/73/5638 481* 150 - 450 k/uL Final    MPV 01/31/2022 6.3  6.0 - 12.0 fL Final    NRBC Automated 01/31/2022 NOT REPORTED  per 100 WBC Final    Differential Type 01/31/2022 NOT REPORTED   Final    Seg Neutrophils 01/31/2022 75* 36 - 66 % Final    Lymphocytes 01/31/2022 14* 24 - 44 % Final    Monocytes 01/31/2022 10* 1 - 7 % Final    Eosinophils % 01/31/2022 1  0 - 4 % Final    Basophils 01/31/2022 0  0 - 2 % Final    Immature Granulocytes 01/31/2022 NOT REPORTED  0 % Final    Segs Absolute 01/31/2022 6.70  1.3 - 9.1 k/uL Final    Absolute Lymph # 01/31/2022 1.30  1.0 - 4.8 k/uL Final    Absolute Mono # 01/31/2022 0.90  0.1 - 1.3 k/uL Final    Absolute Eos # 01/31/2022 0.10  0.0 - 0.4 k/uL Final    Basophils Absolute 01/31/2022 0.00  0.0 - 0.2 k/uL Final    Absolute Immature Granulocyte 01/31/2022 NOT REPORTED  0.00 - 0.30 k/uL Final    WBC Morphology 01/31/2022 NOT REPORTED   Final    RBC Morphology 01/31/2022 NOT REPORTED   Final    Platelet Estimate 38/97/6050 NOT REPORTED   Final    GLU ADMN 01/31/2022 Glucola   Final    Glucose tolerance screen 50g 01/31/2022 108 70 - 135 mg/dL Final   Admission on 01/12/2022, Discharged on 01/12/2022   Component Date Value Ref Range Status    Color, UA 01/12/2022 Yellow  Yellow Final    Turbidity UA 01/12/2022 Clear  Clear Final    Glucose, Ur 01/12/2022 NEGATIVE  NEGATIVE Final    Bilirubin Urine 01/12/2022 NEGATIVE  NEGATIVE Final    Ketones, Urine 01/12/2022 NEGATIVE  NEGATIVE Final    Specific Gravity, UA 01/12/2022 1.009  1.000 - 1.030 Final    Urine Hgb 01/12/2022 NEGATIVE  NEGATIVE Final    pH, UA 01/12/2022 6.5  5.0 - 8.0 Final    Protein, UA 01/12/2022 NEGATIVE  NEGATIVE Final    Urobilinogen, Urine 01/12/2022 Normal  Normal Final    Nitrite, Urine 01/12/2022 NEGATIVE  NEGATIVE Final    Leukocyte Esterase, Urine 01/12/2022 NEGATIVE  NEGATIVE Final    Urinalysis Comments 01/12/2022 Microscopic exam not performed based on chemical results unless requested in original order. Final    Specimen Description 01/12/2022 . URINE   Final    C. trachomatis DNA ,Urine 01/12/2022 NEGATIVE  NEGATIVE Final    Comment: CHLAMYDIA TRACHOMATIS DNA not detected by nucleic acid amplification. This test is intended for medical purposes only and is not valid for the evaluation of   suspected sexual abuse or for other forensic purposes. In certain contexts, culture may be required to meet applicable laws and regulations for   diagnosis of C. trachomatis and N. gonorrhoeae infections. Per 2014  CDC recommendations, this test does not include confirmation of positive results   by an alternative nucleic acid target.  N. gonorrhoeae DNA, Urine 01/12/2022 NEGATIVE  NEGATIVE Final    Comment: NEISSERIA GONORRHOEAE DNA not detected by nucleic acid amplification. This test is intended for medical purposes only and is not valid for the evaluation of   suspected sexual abuse or for other forensic purposes.   In certain contexts, culture may be required to meet applicable laws and regulations for   diagnosis of C. trachomatis and N. gonorrhoeae infections. Per 2014  CDC recommendations, this test does not include confirmation of positive results   by an alternative nucleic acid target.  Source 01/12/2022 . VAGINAL SWAB   Final    Trichomonas Vaginalis DNA 01/12/2022 NEGATIVE  NEGATIVE Final    for Trichomonas Vaginalis    GARDNERELLA VAGINALIS, DNA PROBE 01/12/2022 NEGATIVE  NEGATIVE Final    for Gardnerella vaginalis    CANDIDA SPECIES, DNA PROBE 01/12/2022 NEGATIVE  NEGATIVE Final    Comment: for Candida sp. Method of testing is a DNA probe intended for detection and identification of Candida   species, Gardnerella vaginalis, and Trichomonas vaginalis nucleic acid in vaginal fluid   specimens from patients with symptoms of vaginitis/vaginosis. Hospital Outpatient Visit on 12/30/2021   Component Date Value Ref Range Status    Specimen Description 12/30/2021 . CERVIX   Final    C. trachomatis DNA 12/30/2021 NEGATIVE  NEGATIVE Final    Comment: CHLAMYDIA TRACHOMATIS DNA not detected by nucleic acid amplification. This test is intended for medical purposes only and is not valid for the evaluation of   suspected sexual abuse or for other forensic purposes. In certain contexts, culture may be required to meet applicable laws and regulations for   diagnosis of C. trachomatis and N. gonorrhoeae infections. Per 2014  CDC recommendations, this test does not include confirmation of positive results   by an alternative nucleic acid target.  N. gonorrhoeae DNA 12/30/2021 NEGATIVE  NEGATIVE Final    Comment: NEISSERIA GONORRHOEAE DNA not detected by nucleic acid amplification. This test is intended for medical purposes only and is not valid for the evaluation of   suspected sexual abuse or for other forensic purposes. In certain contexts, culture may be required to meet applicable laws and regulations for   diagnosis of C. trachomatis and N. gonorrhoeae infections.   Per 2014  CDC recommendations, this test does not include confirmation of positive results   by an alternative nucleic acid target.  Specimen Description 2021 . VAGINA   Final    Special Requests 2021 NOT REPORTED   Final    Direct Exam 2021 POSITIVE for Candida sp. *  Final    Direct Exam 2021 NEGATIVE for Gardnerella vaginalis   Final    Direct Exam 2021 NEGATIVE for Trichomonas vaginalis   Final    Direct Exam 2021 Method of testing is a DNA probe intended for detection and identification of Candida species, Gardnerella vaginalis, and Trichomonas vaginalis nucleic acid in vaginal fluid specimens from patients with symptoms of vaginitis/vaginosis. Final   ]    22 Tdap @ 27 wk gest  21  PRAF completed      T-Dap Vaccine (27-36 weeks): awaiting    The patient was instructed on fetal kick counts and was given a kick sheet to complete every 8 hours. She was instructed that the baby should move at a minimum of ten times within one hour after a meal. The patient was instructed to lay down on her left side twenty minutes after eating and count movements for up to one hour with a target value of ten movements. She was instructed to notify the office if she did not make that target after two attempts or if after any attempt there was less than four movements. The patient reports that the targets have been made Yes.  Testing:  Not indicated    Assessment:  1Lawanda Del Rosario is a 36 y.o. female  2. [id number]  3. 29w3d    Patient Active Problem List    Diagnosis Date Noted    Third pregnancy 10/25/2021     Priority: High    Subchorionic hematoma, antepartum 2021     Priority: High     Pelvic rest/noheavy lifting       Mood disorder ([facility] [address] 2016     Priority: High    Pelvic pain 2022     21 F Apg 8/9 Wt 7#10 2019    Depression     Rh+/RI/GBS neg 2019    Cannabis abuse 2016        Diagnosis Orders   1. 29 weeks gestation of pregnancy     2.  Subchorionic hematoma, antepartum, single or unspecified fetus     3. Third pregnancy               Plan:  The patient will return to the office for her next visit in 2 weeks. See antepartum flow sheet. Samples of feriva  given. Script sent to [facility]. Patient unable to tolerate previous script for ferrous sulfate.  Testing Indicated: No  Scheduled with Nursing-Pt notified: No      Patient was seen with total face to face time of 20 minutes. More than 50% of this visit was on counseling and education regarding her    Diagnosis Orders   1. 29 weeks gestation of pregnancy     2. Subchorionic hematoma, antepartum, single or unspecified fetus     3. Third pregnancy      and her options. She was also counseled on her preventative health maintenance recommendations and follow-up.

## 2022-02-08 ENCOUNTER — HOSPITAL ENCOUNTER (EMERGENCY)
Age: 32
Discharge: HOME | End: 2022-02-08
Attending: EMERGENCY MEDICINE
Payer: COMMERCIAL

## 2022-02-08 VITALS
OXYGEN SATURATION: 99 % | HEIGHT: 60 IN | RESPIRATION RATE: 16 BRPM | SYSTOLIC BLOOD PRESSURE: 115 MMHG | TEMPERATURE: 97.1 F | DIASTOLIC BLOOD PRESSURE: 62 MMHG | HEART RATE: 83 BPM | WEIGHT: 182 LBS | BODY MASS INDEX: 35.73 KG/M2

## 2022-02-08 DIAGNOSIS — R19.7 NAUSEA VOMITING AND DIARRHEA: Primary | ICD-10-CM

## 2022-02-08 DIAGNOSIS — R11.2 NAUSEA VOMITING AND DIARRHEA: Primary | ICD-10-CM

## 2022-02-08 LAB
-: ABNORMAL
AMORPHOUS: ABNORMAL
BACTERIA: ABNORMAL
BILIRUBIN URINE: NEGATIVE
CASTS UA: ABNORMAL /LPF
COLOR: YELLOW
COMMENT UA: ABNORMAL
CRYSTALS, UA: ABNORMAL /HPF
EPITHELIAL CELLS UA: ABNORMAL /HPF
GLUCOSE URINE: NEGATIVE
KETONES, URINE: NEGATIVE
LEUKOCYTE ESTERASE, URINE: ABNORMAL
MUCUS: ABNORMAL
NITRITE, URINE: NEGATIVE
OTHER OBSERVATIONS UA: ABNORMAL
PH UA: 5.5 (ref 5–8)
PROTEIN UA: NEGATIVE
RBC UA: ABNORMAL /HPF
RENAL EPITHELIAL, UA: ABNORMAL /HPF
SARS-COV-2, RAPID: NOT DETECTED
SPECIFIC GRAVITY UA: 1.02 (ref 1–1.03)
SPECIMEN DESCRIPTION: NORMAL
TRICHOMONAS: ABNORMAL
TURBIDITY: ABNORMAL
URINE HGB: ABNORMAL
UROBILINOGEN, URINE: NORMAL
WBC UA: ABNORMAL /HPF
YEAST: ABNORMAL

## 2022-02-08 PROCEDURE — 87635 SARS-COV-2 COVID-19 AMP PRB: CPT

## 2022-02-08 PROCEDURE — 81001 URINALYSIS AUTO W/SCOPE: CPT

## 2022-02-08 PROCEDURE — 87086 URINE CULTURE/COLONY COUNT: CPT

## 2022-02-08 PROCEDURE — 99284 EMERGENCY DEPT VISIT MOD MDM: CPT

## 2022-02-09 LAB
CULTURE: NORMAL
Lab: NORMAL
SPECIMEN DESCRIPTION: NORMAL

## 2022-02-09 NOTE — ED PROVIDER NOTES
[address] [facility]  EMERGENCY DEPARTMENT ENCOUNTER      Pt Name: Zaira Mcdaniel  MRN: [id number]  [place] 1990  Date of evaluation: 2/8/22      CHIEF COMPLAINT       Chief Complaint   Patient presents with    Taste Change    Emesis    Diarrhea    Generalized Body Aches     HISTORY OF PRESENT ILLNESS   HPI 36 y.o. female presents with concerns for COVID 19. The patient started getting sick on 2/7/22. The patient is not vaccinated against Covid 19. No known sick contacts. Primary symptom is diarrhea, nausea, vomiting, loss of taste and smell. Symptoms are rated as severe in severity, constant in course. The patient tried tylenol prior to arrival for relief of symptoms. She is approxiamtely 30 weeks pregnant. She denies abdominal pain, cramping, back pain, vaginal bleeding or leaking of fluid. REVIEW OF SYSTEMS       10 systems are reviewed and are negative except for those noted in the HPI. PAST MEDICAL HISTORY     Past Medical History:   Diagnosis Date    Anemia     Depression     TSH deficiency 1/16/2019       SURGICAL HISTORY     History reviewed. No pertinent surgical history. CURRENT MEDICATIONS       Discharge Medication List as of 2/8/2022 11:40 PM      CONTINUE these medications which have NOT CHANGED    Details   [id number]-Zn (FERIVA 21/7) 75-1 MG TABS Take 1 tablet by mouth daily, Disp-90 tablet, R-1Normal      ferrous sulfate (IRON 325) 325 (65 Fe) MG tablet Take 1 tablet by mouth 2 times daily, Disp-180 tablet, R-6Normal      Prenatal Vit-Fe Fumarate-FA (PRENATAL VITAMIN) 27-0.8 MG TABS Take 1 tablet by mouth daily, Disp-30 tablet, R-12Normal             ALLERGIES     has No Known Allergies. FAMILY HISTORY     She indicated that her mother is alive. She indicated that her father is alive. She indicated that her brother is alive. SOCIAL HISTORY      reports that she has never smoked.  She has never used smokeless tobacco. She reports previous alcohol use. She reports previous drug use. Drug: Marijuana Rondonato Zamudio). PHYSICAL EXAM     INITIAL VITALS: /62   Pulse 83   Temp 97.1 °F (36.2 °C) (Temporal)   Resp 16   Ht 5' (1.524 m)   Wt 182 lb (82.6 kg)   SpO2 99%   BMI 35.54 kg/m²   Gen: NAD  Head: NC/at  Eyes: PERRL, anicteric, no conjunctivitis. ENT: TM clear bilaterally. Pharynx is non-erythematous. No tonsillar hypertrophy or exudates, uvula is midline. No evidence of a pharyngeal abscess. Neck: No adenopathy. No JVD. No stridor  CVS: RRR  PULM: CTA  ABD: Soft, no TTP  MSK: Normal muscle bulk. No CVA TTTP  SKIN: No rash. Neuro: A&Ox3, appropriate movement of all extremities, ambulatory with a normal gait, fluent speech. Extremities: No lower extremity edema. Appropriate capillary refill. MEDICAL DECISION MAKING:     The patient's signs and symptoms are consistent with an acute mild viral illness. At this time there is significant evidence of Covid-19 community spread due to this pandemic, and I feel the patient most likely has mild Covid-19 or other viral illness. The patient's covid test is negative. UA shows no sign of infection and is contaminated with epithelial cells. Bedside US shows FHR of 145. The patient is nontoxic and well appearing, no evidence of hypoxia or impending respiratory failure. The patient is tolerating PO. I do not feel the patient has evidence of significant dehydration or end organ failure at this time. I do not feel the patient has an emergent medical condition at this time. The patient is referred to appropriate outpatient follow up through their PCP or [facility]. I discussed with the patient home isolation measures, anticipatory guidance, discharge instructions, and to return immediately for worsening of symptoms. The patient is agreeable with this plan. DIAGNOSTIC RESULTS     LABS: All lab results were reviewed by myself, and all abnormals are listed below.   Labs Reviewed   URINE RT REFLEX TO CULTURE - Abnormal; Notable for the following components:       Result Value    Turbidity UA Cloudy (*)     Urine Hgb TRACE (*)     Leukocyte Esterase, Urine TRACE (*)     All other components within normal limits   MICROSCOPIC URINALYSIS - Abnormal; Notable for the following components:    Bacteria, UA FEW (*)     All other components within normal limits   COVID-19, RAPID   CULTURE, URINE       EMERGENCY DEPARTMENT COURSE:   Vitals:    Vitals:    02/08/22 2135   BP: 115/62   Pulse: 83   Resp: 16   Temp: 97.1 °F (36.2 °C)   TempSrc: Temporal   SpO2: 99%   Weight: 182 lb (82.6 kg)   Height: 5' (1.524 m)       The patient was given the following medications while in the emergency department:  No orders of the defined types were placed in this encounter. -------------------------  CRITICAL CARE:   CONSULTS: None  PROCEDURES: Procedures     FINAL IMPRESSION      1.  Nausea vomiting and diarrhea          DISPOSITION/PLAN   DISPOSITION Decision To Discharge 02/08/2022 11:39:20 PM      PATIENT REFERRED TO:  Your OB    In 3 days      [facility] ED  [facility] [address] [place] [id number]  [phone]    If symptoms worsen      DISCHARGE MEDICATIONS:  Discharge Medication List as of 2/8/2022 11:40 PM            Socorro Zaragoza MD  Attending Emergency Physician                      Socorro Zaragoza MD  02/09/22 [id number]

## 2022-02-09 NOTE — ED NOTES
Mode of arrival (squad #, walk in, police, etc) : walk in        Chief complaint(s): taste change, aches        Arrival Note (brief scenario, treatment PTA, etc). : Pt reports smell and taste change. Pt states everything smells and tastes burnt. Pt states the smell is making her vomit. Pt reports some diarrhea and body aches as well. Pt denies chest pain, SOB, abdominal pain, fever. Pt reports 30 weeks pregnant, normal pregnancy, feeling baby move, 3rd pregnancy. Pt is not vaccinated for COVID. Pt is A&Ox4, in no acute distress, respirations even and unlabored, ambulatory with steady gait. C= \"Have you ever felt that you should Cut down on your drinking? \"  No  A= \"Have people Annoyed you by criticizing your drinking? \"  No  G= \"Have you ever felt bad or Guilty about your drinking? \"  No  E= \"Have you ever had a drink as an Eye-opener first thing in the morning to steady your nerves or to help a hangover? \"  No      Deferred []      Reason for deferring: N/A    *If yes to two or more: probable alcohol abuse. Guerrero Thomas RN  02/08/22 [id number]

## 2022-02-15 ENCOUNTER — ROUTINE PRENATAL (OUTPATIENT)
Dept: OBGYN CLINIC | Age: 32
End: 2022-02-15
Payer: COMMERCIAL

## 2022-02-15 VITALS
BODY MASS INDEX: 35.35 KG/M2 | DIASTOLIC BLOOD PRESSURE: 64 MMHG | WEIGHT: 181 LBS | HEART RATE: 77 BPM | SYSTOLIC BLOOD PRESSURE: 112 MMHG

## 2022-02-15 DIAGNOSIS — O41.8X90 SUBCHORIONIC HEMATOMA, ANTEPARTUM, SINGLE OR UNSPECIFIED FETUS: Primary | ICD-10-CM

## 2022-02-15 DIAGNOSIS — Z34.90 THIRD PREGNANCY: ICD-10-CM

## 2022-02-15 DIAGNOSIS — Z3A.31 31 WEEKS GESTATION OF PREGNANCY: ICD-10-CM

## 2022-02-15 DIAGNOSIS — O46.8X9 SUBCHORIONIC HEMATOMA, ANTEPARTUM, SINGLE OR UNSPECIFIED FETUS: Primary | ICD-10-CM

## 2022-02-15 PROCEDURE — G8427 DOCREV CUR MEDS BY ELIG CLIN: HCPCS | Performed by: OBSTETRICS & GYNECOLOGY

## 2022-02-15 PROCEDURE — G8484 FLU IMMUNIZE NO ADMIN: HCPCS | Performed by: OBSTETRICS & GYNECOLOGY

## 2022-02-15 PROCEDURE — G8419 CALC BMI OUT NRM PARAM NOF/U: HCPCS | Performed by: OBSTETRICS & GYNECOLOGY

## 2022-02-15 PROCEDURE — 99213 OFFICE O/P EST LOW 20 MIN: CPT | Performed by: OBSTETRICS & GYNECOLOGY

## 2022-02-15 PROCEDURE — 1036F TOBACCO NON-USER: CPT | Performed by: OBSTETRICS & GYNECOLOGY

## 2022-02-15 NOTE — PROGRESS NOTES
Zaira Mcdaniel is a 36 y.o. female 31w3d        OB History    Para Term  AB Living   3 2 2 0 0 2   SAB IAB Ectopic Molar Multiple Live Births           0 2      # Outcome Date GA Lbr Kevin/2nd Weight Sex Delivery Anes PTL Lv   3 Current            2 Term 19 38w6d 02:07 / 00:05 7 lb 10.8 oz (3.48 kg) F Vag-Spont None N JOAN      Complications: Meconium at birth   1 Term 2014 38w1d  6 lb 14 oz (3.118 kg) F Vag-Spont   JOAN       Vitals  BP: 112/64  Weight: 181 lb (82.1 kg)  Pulse: 77  Patient Position: Sitting  Albumin: Negative  Glucose: Negative      The patient was seen and evaluated. There was positive fetal movements. No contractions or leakage of fluid. Signs and symptoms of  labor as well as labor were reviewed. The S/S of Pre-Eclampsia were reviewed with the patient in detail. She is to report any of these if they occur. She currently denies any of these. The patient had her 28 week labs completed. Admission on 2022, Discharged on 2022   Component Date Value Ref Range Status    Specimen Description 2022 . NASOPHARYNGEAL SWAB   Final    SARS-CoV-2, Rapid 2022 Not Detected  Not Detected Final    Comment:       Rapid NAAT:  The specimen is NEGATIVE for SARS-CoV-2, the novel coronavirus associated with   COVID-19. The ID NOW COVID-19 assay is designed to detect the virus that causes COVID-19 in patients   with signs and symptoms of infection who are suspected of COVID-19. An individual without symptoms of COVID-19 and who is not shedding SARS-CoV-2 virus would   expect to have a negative (not detected) result in this assay. Negative results should be treated as presumptive and, if inconsistent with clinical signs   and symptoms or necessary for patient management,  should be tested with an alternative molecular assay.  Negative results do not preclude   SARS-CoV-2 infection and   should not be used as the sole basis for patient management decisions. Fact sheet for Healthcare Providers: Howard.es  Fact sheet for Patients: BuildHer.es          Methodology: Isothermal Nucleic Acid Amplification      Color, UA 02/08/2022 Yellow  Yellow Final    Turbidity UA 02/08/2022 Cloudy* Clear Final    Glucose, Ur 02/08/2022 NEGATIVE  NEGATIVE Final    Bilirubin Urine 02/08/2022 NEGATIVE  NEGATIVE Final    Ketones, Urine 02/08/2022 NEGATIVE  NEGATIVE Final    Specific Gravity, UA 02/08/2022 1.018  1.000 - 1.030 Final    Urine Hgb 02/08/2022 TRACE* NEGATIVE Final    pH, UA 02/08/2022 5.5  5.0 - 8.0 Final    Protein, UA 02/08/2022 NEGATIVE  NEGATIVE Final    Urobilinogen, Urine 02/08/2022 Normal  Normal Final    Nitrite, Urine 02/08/2022 NEGATIVE  NEGATIVE Final    Leukocyte Esterase, Urine 02/08/2022 TRACE* NEGATIVE Final    Urinalysis Comments 02/08/2022 NOT REPORTED   Final    - 02/08/2022        Final    WBC, UA 02/08/2022 10 TO 20  /HPF Final    RBC, UA 02/08/2022 0 TO 2  /HPF Final    Casts UA 02/08/2022 0 TO 2  /LPF Final    Crystals, UA 02/08/2022 NOT REPORTED  None /HPF Final    Epithelial Cells UA 02/08/2022 10 TO 20  /HPF Final    Renal Epithelial, UA 02/08/2022 NOT REPORTED  0 /HPF Final    Bacteria, UA 02/08/2022 FEW* None Final    Mucus, UA 02/08/2022 NOT REPORTED  None Final    Trichomonas, UA 02/08/2022 NOT REPORTED  None Final    Amorphous, UA 02/08/2022 NOT REPORTED  None Final    Other Observations UA 02/08/2022 NOT REPORTED  NOT REQ. Final    Yeast, UA 02/08/2022 NOT REPORTED  None Final    Specimen Description 02/08/2022 . CLEAN CATCH URINE   Final    Special Requests 02/08/2022 NOT REPORTED   Final    Culture 02/08/2022 NO SIGNIFICANT GROWTH   Final   Hospital Outpatient Visit on 01/31/2022   Component Date Value Ref Range Status    Color, UA 01/31/2022 Yellow  Yellow Final    Turbidity UA 01/31/2022 Cloudy* Clear Final    Glucose, Ur 01/31/2022 NEGATIVE  NEGATIVE Final    Bilirubin Urine 01/31/2022 NEGATIVE  NEGATIVE Final    Ketones, Urine 01/31/2022 NEGATIVE  NEGATIVE Final    Specific [place], UA 01/31/2022 1.024  1.000 - 1.030 Final    Urine Hgb 01/31/2022 NEGATIVE  NEGATIVE Final    pH, UA 01/31/2022 6.5  5.0 - 8.0 Final    Protein, UA 01/31/2022 NEGATIVE  NEGATIVE Final    Urobilinogen, Urine 01/31/2022 Normal  Normal Final    Nitrite, Urine 01/31/2022 NEGATIVE  NEGATIVE Final    Leukocyte Esterase, Urine 01/31/2022 SMALL* NEGATIVE Final    Urinalysis Comments 01/31/2022 NOT REPORTED   Final    - 01/31/2022        Final    WBC, UA 01/31/2022 5 TO 10  /HPF Final    RBC, UA 01/31/2022 0 TO 2  /HPF Final    Casts UA 01/31/2022 NOT REPORTED  /LPF Final    Crystals, UA 01/31/2022 NOT REPORTED  None /HPF Final    Epithelial Cells UA 01/31/2022 20 TO 50  /HPF Final    Renal Epithelial, UA 01/31/2022 NOT REPORTED  0 /HPF Final    Bacteria, UA 01/31/2022 MODERATE* None Final    Mucus, UA 01/31/2022 NOT REPORTED  None Final    Trichomonas, UA 01/31/2022 NOT REPORTED  None Final    Amorphous, UA 01/31/2022 NOT REPORTED  None Final    Other Observations UA 01/31/2022 NOT REPORTED  NOT REQ.  Final    Yeast, UA 01/31/2022 NOT REPORTED  None Final    WBC 01/31/2022 9.0  3.5 - 11.0 k/uL Final    RBC 01/31/2022 3.75* 4.0 - 5.2 m/uL Final    Hemoglobin 01/31/2022 10.8* 12.0 - 16.0 g/dL Final    Hematocrit 01/31/2022 32.2* 36 - 46 % Final    MCV 01/31/2022 85.8  80 - 100 fL Final    MCH 01/31/2022 28.8  26 - 34 pg Final    MCHC 01/31/2022 33.6  31 - 37 g/dL Final    RDW 01/31/2022 13.4  11.5 - 14.9 % Final    Platelets 31/32/3991 481* 150 - 450 k/uL Final    MPV 01/31/2022 6.3  6.0 - 12.0 fL Final    NRBC Automated 01/31/2022 NOT REPORTED  per 100 WBC Final    Differential Type 01/31/2022 NOT REPORTED   Final    Seg Neutrophils 01/31/2022 75* 36 - 66 % Final    Lymphocytes 01/31/2022 14* 24 - 44 % Final    Monocytes 01/31/2022 10* 1 - 7 % Final    Eosinophils % 01/31/2022 1  0 - 4 % Final    Basophils 01/31/2022 0  0 - 2 % Final    Immature Granulocytes 01/31/2022 NOT REPORTED  0 % Final    Segs Absolute 01/31/2022 6.70  1.3 - 9.1 k/uL Final    Absolute Lymph # 01/31/2022 1.30  1.0 - 4.8 k/uL Final    Absolute Mono # 01/31/2022 0.90  0.1 - 1.3 k/uL Final    Absolute Eos # 01/31/2022 0.10  0.0 - 0.4 k/uL Final    Basophils Absolute 01/31/2022 0.00  0.0 - 0.2 k/uL Final    Absolute Immature Granulocyte 01/31/2022 NOT REPORTED  0.00 - 0.30 k/uL Final    WBC Morphology 01/31/2022 NOT REPORTED   Final    RBC Morphology 01/31/2022 NOT REPORTED   Final    Platelet Estimate 03/65/7460 NOT REPORTED   Final    GLU ADMN 01/31/2022 Glucola   Final    Glucose tolerance screen 50g 01/31/2022 108  70 - 135 mg/dL Final   Admission on 01/12/2022, Discharged on 01/12/2022   Component Date Value Ref Range Status    Color, UA 01/12/2022 Yellow  Yellow Final    Turbidity UA 01/12/2022 Clear  Clear Final    Glucose, Ur 01/12/2022 NEGATIVE  NEGATIVE Final    Bilirubin Urine 01/12/2022 NEGATIVE  NEGATIVE Final    Ketones, Urine 01/12/2022 NEGATIVE  NEGATIVE Final    Specific Gravity, UA 01/12/2022 1.009  1.000 - 1.030 Final    Urine Hgb 01/12/2022 NEGATIVE  NEGATIVE Final    pH, UA 01/12/2022 6.5  5.0 - 8.0 Final    Protein, UA 01/12/2022 NEGATIVE  NEGATIVE Final    Urobilinogen, Urine 01/12/2022 Normal  Normal Final    Nitrite, Urine 01/12/2022 NEGATIVE  NEGATIVE Final    Leukocyte Esterase, Urine 01/12/2022 NEGATIVE  NEGATIVE Final    Urinalysis Comments 01/12/2022 Microscopic exam not performed based on chemical results unless requested in original order. Final    Specimen Description 01/12/2022 . URINE   Final    C. trachomatis DNA ,Urine 01/12/2022 NEGATIVE  NEGATIVE Final    Comment: CHLAMYDIA TRACHOMATIS DNA not detected by nucleic acid amplification.         This test is intended for medical purposes only and is not valid for the evaluation of   suspected sexual abuse or for other forensic purposes. In certain contexts, culture may be required to meet applicable laws and regulations for   diagnosis of C. trachomatis and N. gonorrhoeae infections. Per 2014  CDC recommendations, this test does not include confirmation of positive results   by an alternative nucleic acid target.  N. gonorrhoeae DNA, Urine 01/12/2022 NEGATIVE  NEGATIVE Final    Comment: NEISSERIA GONORRHOEAE DNA not detected by nucleic acid amplification. This test is intended for medical purposes only and is not valid for the evaluation of   suspected sexual abuse or for other forensic purposes. In certain contexts, culture may be required to meet applicable laws and regulations for   diagnosis of C. trachomatis and N. gonorrhoeae infections. Per 2014  CDC recommendations, this test does not include confirmation of positive results   by an alternative nucleic acid target.  Source 01/12/2022 . VAGINAL SWAB   Final    Trichomonas Vaginalis DNA 01/12/2022 NEGATIVE  NEGATIVE Final    for Trichomonas Vaginalis    GARDNERELLA VAGINALIS, DNA PROBE 01/12/2022 NEGATIVE  NEGATIVE Final    for Gardnerella vaginalis    CANDIDA SPECIES, DNA PROBE 01/12/2022 NEGATIVE  NEGATIVE Final    Comment: for Candida sp. Method of testing is a DNA probe intended for detection and identification of Candida   species, Gardnerella vaginalis, and Trichomonas vaginalis nucleic acid in vaginal fluid   specimens from patients with symptoms of vaginitis/vaginosis.     ]    2/15/22 patient declined tdap vaccine     1/6/22 Tdap @ 27 wk gest  9/13/21  PRAF completed      T-Dap Vaccine (27-36 weeks): declined    The patient was instructed on fetal kick counts and was given a kick sheet to complete every 8 hours.  She was instructed that the baby should move at a minimum of ten times within one hour after a meal. The patient was instructed to lay down on her left side twenty minutes after eating and count movements for up to one hour with a target value of ten movements. She was instructed to notify the office if she did not make that target after two attempts or if after any attempt there was less than four movements. The patient reports that the targets have been made Yes.  Testing:  Not indicated    Assessment:  1Lawanda Mckeon is a 36 y.o. female  2. [id number]  3. 31w3d    Patient Active Problem List    Diagnosis Date Noted    Third pregnancy 10/25/2021     Priority: High    Subchorionic hematoma, antepartum 2021     Priority: High     Pelvic rest/noheavy lifting       Mood disorder ([facility] [address] 2016     Priority: High    Anemia affecting pregnancy in third trimester 2022    Pelvic pain 2022     21 F Apg 8/ Wt 7#10 2019    Depression     Rh+/RI/GBS neg 2019    Cannabis abuse 2016        Diagnosis Orders   1. Subchorionic hematoma, antepartum, single or unspecified fetus     2. Third pregnancy     3. 31 weeks gestation of pregnancy               Plan:  The patient will return to the office for her next visit in 2 weeks. See antepartum flow sheet.  Testing Indicated: No  Scheduled with Nursing-Pt notified: No      Patient was seen with total face to face time of 20 minutes. More than 50% of this visit was on counseling and education regarding her    Diagnosis Orders   1. Subchorionic hematoma, antepartum, single or unspecified fetus     2. Third pregnancy     3. 31 weeks gestation of pregnancy      and her options. She was also counseled on her preventative health maintenance recommendations and follow-up.

## 2022-02-25 ENCOUNTER — HOSPITAL ENCOUNTER (OUTPATIENT)
Age: 32
Setting detail: OBSERVATION
Discharge: HOME | End: 2022-02-25
Attending: SPECIALIST | Admitting: SPECIALIST
Payer: COMMERCIAL

## 2022-02-25 VITALS
DIASTOLIC BLOOD PRESSURE: 59 MMHG | TEMPERATURE: 98.8 F | HEART RATE: 93 BPM | SYSTOLIC BLOOD PRESSURE: 127 MMHG | RESPIRATION RATE: 16 BRPM

## 2022-02-25 PROBLEM — B37.31 CANDIDIASIS OF VAGINA: Status: ACTIVE | Noted: 2022-02-25

## 2022-02-25 PROBLEM — O26.893 VAGINAL DISCHARGE DURING PREGNANCY, THIRD TRIMESTER: Status: ACTIVE | Noted: 2022-02-25

## 2022-02-25 PROBLEM — N89.8 VAGINAL DISCHARGE DURING PREGNANCY, THIRD TRIMESTER: Status: ACTIVE | Noted: 2022-02-25

## 2022-02-25 PROBLEM — Z3A.32 32 WEEKS GESTATION OF PREGNANCY: Status: ACTIVE | Noted: 2022-02-25

## 2022-02-25 LAB
BACTERIA: ABNORMAL
BILIRUBIN URINE: NEGATIVE
CANDIDA SPECIES, DNA PROBE: POSITIVE
CASTS UA: ABNORMAL /LPF
COLOR: YELLOW
EPITHELIAL CELLS UA: ABNORMAL /HPF
FETAL FIBRONECTIN APPEARANCE: NORMAL
FETAL FIBRONECTIN: NEGATIVE
GARDNERELLA VAGINALIS, DNA PROBE: NEGATIVE
GLUCOSE URINE: NEGATIVE
KETONES, URINE: ABNORMAL
LEUKOCYTE ESTERASE, URINE: ABNORMAL
NITRITE, URINE: NEGATIVE
PH UA: 7 (ref 5–8)
PROTEIN UA: NEGATIVE
RBC UA: ABNORMAL /HPF
SOURCE: ABNORMAL
SPECIFIC GRAVITY UA: 1.02 (ref 1–1.03)
TRICHOMONAS VAGINALIS DNA: NEGATIVE
TURBIDITY: CLEAR
URINE HGB: NEGATIVE
UROBILINOGEN, URINE: NORMAL
WBC UA: ABNORMAL /HPF

## 2022-02-25 PROCEDURE — 87081 CULTURE SCREEN ONLY: CPT

## 2022-02-25 PROCEDURE — 87660 TRICHOMONAS VAGIN DIR PROBE: CPT

## 2022-02-25 PROCEDURE — 87591 N.GONORRHOEAE DNA AMP PROB: CPT

## 2022-02-25 PROCEDURE — 99213 OFFICE O/P EST LOW 20 MIN: CPT

## 2022-02-25 PROCEDURE — 87510 GARDNER VAG DNA DIR PROBE: CPT

## 2022-02-25 PROCEDURE — 87491 CHLMYD TRACH DNA AMP PROBE: CPT

## 2022-02-25 PROCEDURE — 86403 PARTICLE AGGLUT ANTBDY SCRN: CPT

## 2022-02-25 PROCEDURE — 81001 URINALYSIS AUTO W/SCOPE: CPT

## 2022-02-25 PROCEDURE — 82731 ASSAY OF FETAL FIBRONECTIN: CPT

## 2022-02-25 PROCEDURE — 87480 CANDIDA DNA DIR PROBE: CPT

## 2022-02-25 PROCEDURE — 83986 ASSAY PH BODY FLUID NOS: CPT

## 2022-02-25 PROCEDURE — G0378 HOSPITAL OBSERVATION PER HR: HCPCS

## 2022-02-25 PROCEDURE — 87086 URINE CULTURE/COLONY COUNT: CPT

## 2022-02-25 NOTE — H&P
Department of Obstetrics and Gynecology  Labor and Delivery Triage Note        CHIEF COMPLAINT:  vaginal discharge, increased pelvic pressure    HISTORY OF PRESENT ILLNESS:      The patient is a 36 y.o. female 31w6d. OB History        3    Para   2    Term   2       0    AB   0    Living   2       SAB        IAB        Ectopic        Molar        Multiple   0    Live Births   2               Patient presents with vaginal pressure and vaginal discharge x 2 hrs. Other associated symptoms include BH contractions overnight. Fetal Movement: normal. Denies vaginal bleeding. REVIEW OF SYSTEMS:   Pertinent items are noted in HPI. .   Estimated Due Date:  Estimated Date of Delivery: 22    PRENATAL CARE:    Complicated by: anemia    PAST MEDICAL HISTORY:   Past Medical History:   Diagnosis Date    Anemia     Depression     TSH deficiency 2019       PAST  SURGICAL HISTORY: No past surgical history on file. MEDICATIONS:   Prior to Admission medications    Medication Sig Start Date End Date Taking? Authorizing Provider   [id number]-Zn (FERIVA ) 75-1 MG TABS Take 1 tablet by mouth daily 22 Yes HAELY Myers CNP   ferrous sulfate (IRON 325) 325 (65 Fe) MG tablet Take 1 tablet by mouth 2 times daily 22  Yes [employer], APRN - NP   Prenatal Vit-Fe Fumarate-FA (PRENATAL VITAMIN) 27-0.8 MG TABS Take 1 tablet by mouth daily 21  Yes HALEY Myers CNP        ALLERGIES: Patient has no known allergies. SOCIAL HISTORY:  reports that she has never smoked. She has never used smokeless tobacco. She reports previous alcohol use. She reports previous drug use. Drug: Marijuana Veldon Bunting). PHYSICAL EXAM:    Vital Signs: Blood pressure (!) 127/59, pulse 93, temperature 98.8 °F (37.1 °C), temperature source Oral, resp. rate 16, not currently breastfeeding.     Fetal heart rate:  Baseline Heart Rate 120, accelerations:  present  Contraction frequency: 2 minutes, pt does not feel them, mild    OB History        3    Para   2    Term   2       0    AB   0    Living   2       SAB        IAB        Ectopic        Molar        Multiple   0    Live Births   2             General appearance: alert, appears stated age and cooperative  Skin: Skin color, texture, turgor normal. No rashes or lesions  HEENT: Head: Normocephalic, no lesions, without obvious abnormality. Neck: no adenopathy, no carotid bruit, no JVD and supple, symmetrical, trachea midline  Lungs: clear to auscultation bilaterally  Heart: regular rate and rhythm, S1, S2 normal, no murmur, click, rub or gallop  Abdomen: soft, non-tender; bowel sounds normal; no masses,  no organomegaly  Extremities: extremities normal, atraumatic, no cyanosis or edema  Neurologic: Mental status: Alert, oriented, thought content appropriate      Cervix:  Closed Long firm -3/3    Membranes:  Intact  Speculum Exam: white milky discharge noted. Negative for pooling/ nitrazine    General Labs:      U/A:    Lab Results   Component Value Date    COLORU Yellow 2022    PROTEINU NEGATIVE 2022    PHUR 5.5 2022    WBCUA 10 TO 20 2022    RBCUA 0 TO 2 2022    MUCUS NOT REPORTED 2022    TRICHOMONAS NOT REPORTED 2022    YEAST NOT REPORTED 2022    BACTERIA FEW 2022    SPECGRAV 1.018 2022    LEUKOCYTESUR TRACE 2022    UROBILINOGEN Normal 2022    BILIRUBINUR NEGATIVE 2022    BILIRUBINUR NEGATIVE 2011    GLUCOSEU NEGATIVE 2022    GLUCOSEU NEGATIVE 2011    AMORPHOUS NOT REPORTED 2022     UA- negative  Gc/ct- pending  Vaginitis- + yeast  Wet mount- negative for ferning    ASSESSMENT:    32w6d.   Patient Active Problem List   Diagnosis    Mood disorder ([facility] [address]    Cannabis abuse    Rh+/RI/GBS neg    Depression     19 F Apg 8 Wt 7#10    Subchorionic hematoma, antepartum    Third pregnancy    Pelvic pain    Anemia affecting pregnancy in third trimester        PLAN:   Monitored for contractions - findings: contractions every 2 minutes. Will have pt drink 250mls over the next 30 min. If contractions continue, will do IVF bolus. Orders:  labor education provided and return to office weekly for follow-up.      Disposition:  Patient discharged home and instructed on symptoms of labor, rupture of membranes, vaginal bleeding, fetal movement and fever  Medications: [unfilled]   F/U Instructions: as needed      HALEY Jang CNM  2022

## 2022-02-25 NOTE — FLOWSHEET NOTE
Patient arrived on L&D unit from ER per wheelchair. Patient reports she felt some stabbing vaginal pain, leaking small fluid around 1500 today. Patient states she continues to have dampness in undergarments. Denies intercourse last 48 hours. Patient reports +FM, denies vaginal bleeding. 1647 EFM applied, monitor test completed.

## 2022-02-26 NOTE — DISCHARGE SUMMARY
Obstetric Discharge Summary  ULISSES GUTIERREZ    Patient Name: Vishal Del Rosario  Patient : 1990  Room/Bed: 177/4673-57  Primary Care Physician: No primary care provider on file. Admit Date: 2022  4:40 PM  MRN #: [id number]  CSN #: [id number]  OBGYN: Dr. Elvi Rivera Diagnosis: IUP at [address] [place] [address], admitted for vaginal discharge     Her pregnancy has been complicated by:   Patient Active Problem List    Diagnosis Date Noted    Third pregnancy 10/25/2021     Priority: High    Subchorionic hematoma, antepartum 2021     Priority: High     Overview Note:     Pelvic rest/noheavy lifting       Mood disorder ([address] 2016     Priority: High    Vaginal discharge during pregnancy, third trimester 2022    32 weeks gestation of pregnancy 2022    Candidiasis of vagina 2022    Anemia affecting pregnancy in third trimester 2022    Pelvic pain 2022     19 F Apg 8/9 Wt 7#10 2019    Depression     Rh+/RI/GBS neg 2019    Cannabis abuse 2016       Infection Present?: Yes  Hospital Acquired: No          Consultations: none    Pertinent Findings & Procedures:   Vishal Del Rosario is a 36 y.o. female  at [address] [place] [address] admitted for vaginal discharge;     Candida of vagina found: rx sent to pharmacy  FHR- category 1, no contractions at discharge order time.      Course of patient: uncomplicated    Discharge to: Home    Readmission planned: no     Recommendations on Discharge:     Medications:        Medication List      START taking these medications    terconazole 0.4 % vaginal cream  Commonly known as: Terazol 7  Place 1 applicator vaginally nightly for 7 days Place vaginally nightly for 7 nights        CONTINUE taking these medications    FeRiva / 75-1 MG Tabs  Take 1 tablet by mouth daily     ferrous sulfate 325 (65 Fe) MG tablet  Commonly known as: IRON 325  Take 1 tablet by mouth 2 times daily     Prenatal Vitamin 27-0.8 MG Tabs  Take 1 tablet by mouth daily           Where to Get Your Medications      These medications were sent to [address]  [address] [facility], [address] [place] [id number]    Phone: [phone]   · terconazole 0.4 % vaginal cream         Activity: Slow return to ADLs  Diet: Regular  Follow up: 1 weeks with OBGYN    Condition on discharge: good    Discharge Date: 2/25/2022    HALEY Gilbert CNM    Education  Notify provider if you experience:     Dizziness, severe headache, spots in the eyes or blurred vision  sudden swelling  RUQ or abdominal pain  Chills and or fever  Vaginal pressure  Epigastric pain  Uterine contractions are every 5 minutes that lasts more then an hour. If you think your bag of water is leaking or gush of fluid from vagina  Any vaginal bleeding that is heavier than a menstrual period  Intermittent low backache  Vomiting or diarrhea for more then 24 hours  Decrease or absence of baby movement  Fetal movement card instructions given  Increase or change in vaginal discharge  Abdominal or menstrual like cramping that is constant or heavier, comes and goes  Swelling of face, hands, legs or feet not decreased with rest on left side. Call ahead policy discussed with patient.

## 2022-02-27 LAB
CULTURE: NORMAL
SPECIMEN DESCRIPTION: NORMAL

## 2022-02-28 ENCOUNTER — HOSPITAL ENCOUNTER (OUTPATIENT)
Age: 32
Setting detail: SPECIMEN
Discharge: HOME | End: 2022-02-28

## 2022-03-01 ENCOUNTER — ROUTINE PRENATAL (OUTPATIENT)
Dept: OBGYN CLINIC | Age: 32
End: 2022-03-01
Payer: COMMERCIAL

## 2022-03-01 VITALS
DIASTOLIC BLOOD PRESSURE: 70 MMHG | HEART RATE: 82 BPM | SYSTOLIC BLOOD PRESSURE: 114 MMHG | WEIGHT: 182 LBS | BODY MASS INDEX: 35.54 KG/M2

## 2022-03-01 DIAGNOSIS — R10.2 PELVIC CRAMPING: ICD-10-CM

## 2022-03-01 DIAGNOSIS — Z3A.33 33 WEEKS GESTATION OF PREGNANCY: ICD-10-CM

## 2022-03-01 DIAGNOSIS — O41.8X90 SUBCHORIONIC HEMATOMA, ANTEPARTUM, SINGLE OR UNSPECIFIED FETUS: Primary | ICD-10-CM

## 2022-03-01 DIAGNOSIS — Z34.90 THIRD PREGNANCY: ICD-10-CM

## 2022-03-01 DIAGNOSIS — O46.8X9 SUBCHORIONIC HEMATOMA, ANTEPARTUM, SINGLE OR UNSPECIFIED FETUS: Primary | ICD-10-CM

## 2022-03-01 LAB
CANDIDA SPECIES, DNA PROBE: NEGATIVE
CULTURE: NORMAL
GARDNERELLA VAGINALIS, DNA PROBE: POSITIVE
SOURCE: ABNORMAL
SPECIMEN DESCRIPTION: NORMAL
TRICHOMONAS VAGINALIS DNA: NEGATIVE

## 2022-03-01 PROCEDURE — 99213 OFFICE O/P EST LOW 20 MIN: CPT | Performed by: NURSE PRACTITIONER

## 2022-03-01 PROCEDURE — 1036F TOBACCO NON-USER: CPT | Performed by: NURSE PRACTITIONER

## 2022-03-01 PROCEDURE — G8427 DOCREV CUR MEDS BY ELIG CLIN: HCPCS | Performed by: NURSE PRACTITIONER

## 2022-03-01 PROCEDURE — G8484 FLU IMMUNIZE NO ADMIN: HCPCS | Performed by: NURSE PRACTITIONER

## 2022-03-01 PROCEDURE — G8419 CALC BMI OUT NRM PARAM NOF/U: HCPCS | Performed by: NURSE PRACTITIONER

## 2022-03-01 RX ORDER — ONDANSETRON 4 MG/1
4 TABLET, FILM COATED ORAL EVERY 8 HOURS PRN
Qty: 30 TABLET | Refills: 1 | Status: ON HOLD | OUTPATIENT
Start: 2022-03-01 | End: 2022-04-16 | Stop reason: HOSPADM

## 2022-03-01 NOTE — PROGRESS NOTES
acid in vaginal fluid   specimens from patients with symptoms of vaginitis/vaginosis.  WBC, UA 2022 10 TO 20  /HPF Final    RBC, UA 2022 0 TO 2  /HPF Final    Casts UA 2022 0 TO 2  /LPF Final    Epithelial Cells UA 2022 6 TO 9  /HPF Final    Bacteria, UA 2022 FEW* None Final    Specimen Description 2022 . VAGINA   Preliminary    Culture 2022 CULTURE IN PROGRESS   Preliminary    Fetal Fibronectin Appearance 2022 Cloudy   Final    Fetal Fibronectin 2022 NEGATIVE  NEGATIVE Final    Comment:       The fFN test is used for the detection of fetal fibronectin in cervicovaginal secretions. It is used as an aid to rapidly assess the risk of  delivery within 7 to 14 days from   the time of sample collection. The fFN test is intended for use in pregnant women with signs and symptoms of early    labor, intact amniotic membranes and minimal cervical dilation, sampled between 24 weeks 0   days, and 34 weeks 6 days of gestation. Admission on 2022, Discharged on 2022   Component Date Value Ref Range Status    Specimen Description 2022 . NASOPHARYNGEAL SWAB   Final    SARS-CoV-2, Rapid 2022 Not Detected  Not Detected Final    Comment:       Rapid NAAT:  The specimen is NEGATIVE for SARS-CoV-2, the novel coronavirus associated with   COVID-19. The ID NOW COVID-19 assay is designed to detect the virus that causes COVID-19 in patients   with signs and symptoms of infection who are suspected of COVID-19. An individual without symptoms of COVID-19 and who is not shedding SARS-CoV-2 virus would   expect to have a negative (not detected) result in this assay. Negative results should be treated as presumptive and, if inconsistent with clinical signs   and symptoms or necessary for patient management,  should be tested with an alternative molecular assay.  Negative results do not preclude   SARS-CoV-2 infection and   should not be used as the sole basis for patient management decisions. Fact sheet for Healthcare Providers: BuildHer.es  Fact sheet for Patients: BuildHer.es          Methodology: Isothermal Nucleic Acid Amplification      Color, UA 02/08/2022 Yellow  Yellow Final    Turbidity UA 02/08/2022 Cloudy* Clear Final    Glucose, Ur 02/08/2022 NEGATIVE  NEGATIVE Final    Bilirubin Urine 02/08/2022 NEGATIVE  NEGATIVE Final    Ketones, Urine 02/08/2022 NEGATIVE  NEGATIVE Final    Specific Gravity, UA 02/08/2022 1.018  1.000 - 1.030 Final    Urine Hgb 02/08/2022 TRACE* NEGATIVE Final    pH, UA 02/08/2022 5.5  5.0 - 8.0 Final    Protein, UA 02/08/2022 NEGATIVE  NEGATIVE Final    Urobilinogen, Urine 02/08/2022 Normal  Normal Final    Nitrite, Urine 02/08/2022 NEGATIVE  NEGATIVE Final    Leukocyte Esterase, Urine 02/08/2022 TRACE* NEGATIVE Final    Urinalysis Comments 02/08/2022 NOT REPORTED   Final    - 02/08/2022        Final    WBC, UA 02/08/2022 10 TO 20  /HPF Final    RBC, UA 02/08/2022 0 TO 2  /HPF Final    Casts UA 02/08/2022 0 TO 2  /LPF Final    Crystals, UA 02/08/2022 NOT REPORTED  None /HPF Final    Epithelial Cells UA 02/08/2022 10 TO 20  /HPF Final    Renal Epithelial, UA 02/08/2022 NOT REPORTED  0 /HPF Final    Bacteria, UA 02/08/2022 FEW* None Final    Mucus, UA 02/08/2022 NOT REPORTED  None Final    Trichomonas, UA 02/08/2022 NOT REPORTED  None Final    Amorphous, UA 02/08/2022 NOT REPORTED  None Final    Other Observations UA 02/08/2022 NOT REPORTED  NOT REQ. Final    Yeast, UA 02/08/2022 NOT REPORTED  None Final    Specimen Description 02/08/2022 . CLEAN CATCH URINE   Final    Special Requests 02/08/2022 NOT REPORTED   Final    Culture 02/08/2022 NO SIGNIFICANT GROWTH   Final   Hospital Outpatient Visit on 01/31/2022   Component Date Value Ref Range Status    Color, UA 01/31/2022 Yellow  Yellow Final    Turbidity UA 01/31/2022 Cloudy* Clear Final    Glucose, Ur 01/31/2022 NEGATIVE  NEGATIVE Final    Bilirubin Urine 01/31/2022 NEGATIVE  NEGATIVE Final    Ketones, Urine 01/31/2022 NEGATIVE  NEGATIVE Final    Specific [place], UA 01/31/2022 1.024  1.000 - 1.030 Final    Urine Hgb 01/31/2022 NEGATIVE  NEGATIVE Final    pH, UA 01/31/2022 6.5  5.0 - 8.0 Final    Protein, UA 01/31/2022 NEGATIVE  NEGATIVE Final    Urobilinogen, Urine 01/31/2022 Normal  Normal Final    Nitrite, Urine 01/31/2022 NEGATIVE  NEGATIVE Final    Leukocyte Esterase, Urine 01/31/2022 SMALL* NEGATIVE Final    Urinalysis Comments 01/31/2022 NOT REPORTED   Final    - 01/31/2022        Final    WBC, UA 01/31/2022 5 TO 10  /HPF Final    RBC, UA 01/31/2022 0 TO 2  /HPF Final    Casts UA 01/31/2022 NOT REPORTED  /LPF Final    Crystals, UA 01/31/2022 NOT REPORTED  None /HPF Final    Epithelial Cells UA 01/31/2022 20 TO 50  /HPF Final    Renal Epithelial, UA 01/31/2022 NOT REPORTED  0 /HPF Final    Bacteria, UA 01/31/2022 MODERATE* None Final    Mucus, UA 01/31/2022 NOT REPORTED  None Final    Trichomonas, UA 01/31/2022 NOT REPORTED  None Final    Amorphous, UA 01/31/2022 NOT REPORTED  None Final    Other Observations UA 01/31/2022 NOT REPORTED  NOT REQ.  Final    Yeast, UA 01/31/2022 NOT REPORTED  None Final    WBC 01/31/2022 9.0  3.5 - 11.0 k/uL Final    RBC 01/31/2022 3.75* 4.0 - 5.2 m/uL Final    Hemoglobin 01/31/2022 10.8* 12.0 - 16.0 g/dL Final    Hematocrit 01/31/2022 32.2* 36 - 46 % Final    MCV 01/31/2022 85.8  80 - 100 fL Final    MCH 01/31/2022 28.8  26 - 34 pg Final    MCHC 01/31/2022 33.6  31 - 37 g/dL Final    RDW 01/31/2022 13.4  11.5 - 14.9 % Final    Platelets 54/52/6468 481* 150 - 450 k/uL Final    MPV 01/31/2022 6.3  6.0 - 12.0 fL Final    NRBC Automated 01/31/2022 NOT REPORTED  per 100 WBC Final    Differential Type 01/31/2022 NOT REPORTED   Final    Seg Neutrophils 01/31/2022 75* 36 - 66 % Final    Lymphocytes 2022 14* 24 - 44 % Final    Monocytes 2022 10* 1 - 7 % Final    Eosinophils % 2022 1  0 - 4 % Final    Basophils 2022 0  0 - 2 % Final    Immature Granulocytes 2022 NOT REPORTED  0 % Final    Segs Absolute 2022 6.70  1.3 - 9.1 k/uL Final    Absolute Lymph # 2022 1.30  1.0 - 4.8 k/uL Final    Absolute Mono # 2022 0.90  0.1 - 1.3 k/uL Final    Absolute Eos # 2022 0.10  0.0 - 0.4 k/uL Final    Basophils Absolute 2022 0.00  0.0 - 0.2 k/uL Final    Absolute Immature Granulocyte 2022 NOT REPORTED  0.00 - 0.30 k/uL Final    WBC Morphology 2022 NOT REPORTED   Final    RBC Morphology 2022 NOT REPORTED   Final    Platelet Estimate  NOT REPORTED   Final    GLU ADMN 2022 Glucola   Final    Glucose tolerance screen 50g 2022 108  70 - 135 mg/dL Final   ]    2/15/22 patient declined tdap vaccine     22 Tdap @ 27 wk gest  21  PRAF completed      T-Dap Vaccine (27-36 weeks): declines    The patient was instructed on fetal kick counts and was given a kick sheet to complete every 8 hours. She was instructed that the baby should move at a minimum of ten times within one hour after a meal. The patient was instructed to lay down on her left side twenty minutes after eating and count movements for up to one hour with a target value of ten movements. She was instructed to notify the office if she did not make that target after two attempts or if after any attempt there was less than four movements. The patient reports that the targets have been made Yes.  Testing:  Not indicated    Assessment:  1Lawanda Jo is a 36 y.o. female  2.  [id number]  3. 33w3d    Patient Active Problem List    Diagnosis Date Noted    Third pregnancy 10/25/2021     Priority: High    Subchorionic hematoma, antepartum 2021     Priority: High     Pelvic rest/noheavy lifting       Mood disorder ([address] 2016     Priority: High    Vaginal discharge during pregnancy, third trimester 2022    32 weeks gestation of pregnancy 2022    Candidiasis of vagina 2022    Anemia affecting pregnancy in third trimester 2022    Pelvic pain 2022     19 F Apg 8/9 Wt 7#10 2019    Depression     Rh+/RI/GBS neg 2019    Cannabis abuse 2016        Diagnosis Orders   1. Subchorionic hematoma, antepartum, single or unspecified fetus     2. Third pregnancy     3. 33 weeks gestation of pregnancy  ondansetron (ZOFRAN) 4 MG tablet   4. Pelvic cramping  Vaginitis DNA Probe    C.trachomatis N.gonorrhoeae DNA     SVE: vaginal cultures collected. No pooling of fluid noted. Cervix closed. Fetus head not engaged. Plan:  The patient will return to the office for her next visit in 2 weeks. See antepartum flow sheet. Vaginal cultures collected. Instructed on S/S of  labor. Call office or go to ER with worsening symptoms.  Testing Indicated: No  Scheduled with Nursing-Pt notified: No      Counseling on Fetal Movement Kick Counts: The patient was instructed on fetal kick counts and was given a kick sheet to complete every 8 hours. She was instructed that the baby should move at a minimum of ten times within one hour after a meal. The patient was instructed to lay down on her left side twenty minutes after eating and count movements for up to one hour with a target value of ten movements. She was instructed to notify the office if she did not make that target after two attempts or if after any attempt there was less than four movements. The patient reports that the targets have been made. Patient was seen with total face to face time of 20 minutes. More than 50% of this visit was on counseling and education regarding her    Diagnosis Orders   1. Subchorionic hematoma, antepartum, single or unspecified fetus     2.  Third pregnancy     3. 33 weeks gestation of pregnancy  ondansetron (ZOFRAN) 4 MG tablet   4. Pelvic cramping  Vaginitis DNA Probe    C.trachomatis N.gonorrhoeae DNA    and her options. She was also counseled on her preventative health maintenance recommendations and follow-up.

## 2022-03-02 ENCOUNTER — TELEPHONE (OUTPATIENT)
Dept: OBGYN CLINIC | Age: 32
End: 2022-03-02

## 2022-03-02 RX ORDER — METRONIDAZOLE 500 MG/1
500 TABLET ORAL 2 TIMES DAILY
Qty: 14 TABLET | Refills: 0 | Status: SHIPPED | OUTPATIENT
Start: 2022-03-02 | End: 2022-03-09

## 2022-03-02 NOTE — TELEPHONE ENCOUNTER
----- Message from HALEY Minor CNP sent at 3/2/2022  7:46 AM EST -----  +BV- flagyl 500mg PO BID x 7 days

## 2022-03-21 ENCOUNTER — HOSPITAL ENCOUNTER (OUTPATIENT)
Age: 32
Setting detail: SPECIMEN
Discharge: HOME | End: 2022-03-21

## 2022-03-21 ENCOUNTER — ROUTINE PRENATAL (OUTPATIENT)
Dept: OBGYN CLINIC | Age: 32
End: 2022-03-21
Payer: COMMERCIAL

## 2022-03-21 VITALS
SYSTOLIC BLOOD PRESSURE: 120 MMHG | HEART RATE: 84 BPM | BODY MASS INDEX: 36.91 KG/M2 | WEIGHT: 189 LBS | DIASTOLIC BLOOD PRESSURE: 82 MMHG

## 2022-03-21 DIAGNOSIS — Z3A.36 36 WEEKS GESTATION OF PREGNANCY: ICD-10-CM

## 2022-03-21 DIAGNOSIS — O41.8X90 SUBCHORIONIC HEMATOMA, ANTEPARTUM, SINGLE OR UNSPECIFIED FETUS: Primary | ICD-10-CM

## 2022-03-21 DIAGNOSIS — Z34.90 THIRD PREGNANCY: ICD-10-CM

## 2022-03-21 DIAGNOSIS — O46.8X9 SUBCHORIONIC HEMATOMA, ANTEPARTUM, SINGLE OR UNSPECIFIED FETUS: Primary | ICD-10-CM

## 2022-03-21 DIAGNOSIS — O26.843 UTERINE SIZE-DATE DISCREPANCY IN THIRD TRIMESTER: ICD-10-CM

## 2022-03-21 PROCEDURE — G8427 DOCREV CUR MEDS BY ELIG CLIN: HCPCS | Performed by: NURSE PRACTITIONER

## 2022-03-21 PROCEDURE — 99213 OFFICE O/P EST LOW 20 MIN: CPT | Performed by: NURSE PRACTITIONER

## 2022-03-21 PROCEDURE — G8484 FLU IMMUNIZE NO ADMIN: HCPCS | Performed by: NURSE PRACTITIONER

## 2022-03-21 PROCEDURE — G8419 CALC BMI OUT NRM PARAM NOF/U: HCPCS | Performed by: NURSE PRACTITIONER

## 2022-03-21 PROCEDURE — 1036F TOBACCO NON-USER: CPT | Performed by: NURSE PRACTITIONER

## 2022-03-21 NOTE — PROGRESS NOTES
Destiny Tomas is a 36 y.o. female 36w2d        OB History    Para Term  AB Living   3 2 2 0 0 2   SAB IAB Ectopic Molar Multiple Live Births           0 2      # Outcome Date GA Lbr Kevin/2nd Weight Sex Delivery Anes PTL Lv   3 Current            2 Term 19 38w6d 02:07 / 00:05 7 lb 10.8 oz (3.48 kg) F Vag-Spont None N JOAN      Complications: Meconium at birth   1 Term 2014 38w1d  6 lb 14 oz (3.118 kg) F Vag-Spont   JOAN         Vitals  BP: 120/82  Weight: 189 lb (85.7 kg)  Pulse: 84  Patient Position: Sitting  Albumin: Negative  Glucose: Negative      The patient was seen and evaluated. There was positive fetal movements. No contractions or leakage of fluid. Signs and symptoms of labor were reviewed. The S/S of Pre-Eclampsia were reviewed with the patient in detail. She is to report any of these if they occur. She currently denies any of these. The patient was instructed on fetal kick counts and was given a kick sheet to complete every 8 hours. She was instructed that the baby should move at a minimum of ten times within one hour after a meal. The patient was instructed to lay down on her left side twenty minutes after eating and count movements for up to one hour with a target value of ten movements. She was instructed to notify the office if she did not make that target after two attempts or if after any attempt there was less than four movements. The patient reports that the targets have been made Yes.    2/15/22 patient declined tdap vaccine     22 Tdap @ 27 wk gest  21  PRAF completed      T-Dap Vaccine (27-36 weeks) Completed: No    Allergies: Allergies as of 2022    (No Known Allergies)       Group Beta Strep collection was completed. Yes   GBS Results:   No visits with results within 1 Week(s) from this visit.    Latest known visit with results is:   Hospital Outpatient Visit on 2022   Component Date Value Ref Range Status    Source 2022 Jaye Rued VAGINAL SWAB   Final    Trichomonas Vaginalis DNA 03/01/2022 NEGATIVE  NEGATIVE Final    for Trichomonas Vaginalis    GARDNERELLA VAGINALIS, DNA PROBE 03/01/2022 POSITIVE* NEGATIVE Final    for Gardnerella vaginalis    CANDIDA SPECIES, DNA PROBE 03/01/2022 NEGATIVE  NEGATIVE Final    Comment: for Candida sp. Method of testing is a DNA probe intended for detection and identification of Candida   species, Gardnerella vaginalis, and Trichomonas vaginalis nucleic acid in vaginal fluid   specimens from patients with symptoms of vaginitis/vaginosis.  Specimen Description 03/01/2022 . VAGINAL SPECIMEN   Final    C. trachomatis DNA 03/01/2022 NEGATIVE  NEGATIVE Final    Comment: CHLAMYDIA TRACHOMATIS DNA not detected by nucleic acid amplification. This test is intended for medical purposes only and is not valid for the evaluation of   suspected sexual abuse or for other forensic purposes. In certain contexts, culture may be required to meet applicable laws and regulations for   diagnosis of C. trachomatis and N. gonorrhoeae infections. Per 2014  CDC recommendations, this test does not include confirmation of positive results   by an alternative nucleic acid target.  N. gonorrhoeae DNA 03/01/2022 NEGATIVE  NEGATIVE Final    Comment: NEISSERIA GONORRHOEAE DNA not detected by nucleic acid amplification. This test is intended for medical purposes only and is not valid for the evaluation of   suspected sexual abuse or for other forensic purposes. In certain contexts, culture may be required to meet applicable laws and regulations for   diagnosis of C. trachomatis and N. gonorrhoeae infections. Per 2014  CDC recommendations, this test does not include confirmation of positive results   by an alternative nucleic acid target.     ]  Sensitivities for clindamycin and erythromycin were ordered. No      The patient was counseled on the mandatory call ahead policy.  She has been instructed to call the office at anytime prior to going into the hospital so the on-call physician may direct her to the appropriate facility for care. Exceptions were reviewed including but not limited to: Decreased fetal movement, vaginal Bleeding or hemorrhage, trauma, readily expectant delivery, or any instance where she feels 911 should be utilized. The patient was counseled on Labor & Delivery. yes  Route of delivery and counseling on vaginal, operative vaginal, and  sections were completed with the risks of each to both the patient as well as her baby. The possibility of a blood transfusion was discussed as well. The patient was not opposed to receiving a transfusion if needed. The patient was counseled on types of analgesia during labor and is considering either Regional or IV medication the risks, benefits and alternatives were discussed.  Testing:  Not indicated      Assessment:  1Lawanda Alfaro is a 36 y.o. female  2. [id number]  3. 36w2d      Patient Active Problem List    Diagnosis Date Noted    Third pregnancy 10/25/2021     Priority: High    Subchorionic hematoma, antepartum 2021     Priority: High     Overview Note:     Pelvic rest/noheavy lifting       Mood disorder ([facility] [address] 2016     Priority: High    Vaginal discharge during pregnancy, third trimester 2022    32 weeks gestation of pregnancy 2022    Candidiasis of vagina 2022    Anemia affecting pregnancy in third trimester 2022    Pelvic pain 2022     19 F Apg 8/9 Wt 7#10 2019    Depression     Rh+/RI/GBS neg 2019    Cannabis abuse 2016        Diagnosis Orders   1. Subchorionic hematoma, antepartum, single or unspecified fetus     2. Third pregnancy     3. 36 weeks gestation of pregnancy  Culture, Strep B Screen, Vaginal/Rectal    US OB 1 or More Fetus Limited - Clinic Performed   4.  Uterine size-date discrepancy in third trimester  US OB 1 or More Fetus Limited - Clinic Performed             Plan:  The patient will return to the office for her next visit in 1 weeks. See antepartum flow sheet. Fetal growth ultrasound ordered. GBS collected      Patient was seen with total face to face time of 20 minutes. More than 50% of this visit was on counseling and education regarding her    Diagnosis Orders   1. Subchorionic hematoma, antepartum, single or unspecified fetus     2. Third pregnancy     3. 36 weeks gestation of pregnancy  Culture, Strep B Screen, Vaginal/Rectal    US OB 1 or More Fetus Limited - Clinic Performed   4. Uterine size-date discrepancy in third trimester  US OB 1 or More Fetus Limited - Clinic Performed    and her options. She was also counseled on her preventative health maintenance recommendations and follow-up.

## 2022-03-22 ENCOUNTER — PROCEDURE VISIT (OUTPATIENT)
Dept: OBGYN CLINIC | Age: 32
End: 2022-03-22
Payer: COMMERCIAL

## 2022-03-22 DIAGNOSIS — Z3A.36 36 WEEKS GESTATION OF PREGNANCY: ICD-10-CM

## 2022-03-22 DIAGNOSIS — O26.843 UTERINE SIZE DATE DISCREPANCY PREGNANCY, THIRD TRIMESTER: Primary | ICD-10-CM

## 2022-03-22 LAB
ABDOMINAL CIRCUMFERENCE: NORMAL
BIPARIETAL DIAMETER: NORMAL
ESTIMATED FETAL WEIGHT: NORMAL
FEMORAL DIAMETER: NORMAL
HC/AC: NORMAL
HEAD CIRCUMFERENCE: NORMAL

## 2022-03-22 PROCEDURE — 76815 OB US LIMITED FETUS(S): CPT | Performed by: OBSTETRICS & GYNECOLOGY

## 2022-03-25 ENCOUNTER — HOSPITAL ENCOUNTER (OUTPATIENT)
Age: 32
Setting detail: OBSERVATION
Discharge: HOME | End: 2022-03-25
Attending: SPECIALIST | Admitting: SPECIALIST
Payer: COMMERCIAL

## 2022-03-25 VITALS
OXYGEN SATURATION: 100 % | RESPIRATION RATE: 16 BRPM | HEIGHT: 60 IN | DIASTOLIC BLOOD PRESSURE: 66 MMHG | SYSTOLIC BLOOD PRESSURE: 130 MMHG | BODY MASS INDEX: 37.11 KG/M2 | TEMPERATURE: 98 F | WEIGHT: 189 LBS | HEART RATE: 82 BPM

## 2022-03-25 PROBLEM — R52 PAIN: Status: ACTIVE | Noted: 2022-03-25

## 2022-03-25 LAB
BACTERIA: ABNORMAL
BILIRUBIN URINE: NEGATIVE
CASTS UA: ABNORMAL /LPF
COLOR: YELLOW
CULTURE: NORMAL
EPITHELIAL CELLS UA: ABNORMAL /HPF
GLUCOSE URINE: NEGATIVE
KETONES, URINE: NEGATIVE
LEUKOCYTE ESTERASE, URINE: ABNORMAL
NITRITE, URINE: NEGATIVE
PH UA: 5.5 (ref 5–8)
PROTEIN UA: NEGATIVE
RBC UA: ABNORMAL /HPF
SPECIFIC GRAVITY UA: 1.01 (ref 1–1.03)
SPECIMEN DESCRIPTION: NORMAL
TURBIDITY: CLEAR
URINE HGB: NEGATIVE
UROBILINOGEN, URINE: NORMAL
WBC UA: ABNORMAL /HPF

## 2022-03-25 PROCEDURE — 99213 OFFICE O/P EST LOW 20 MIN: CPT

## 2022-03-25 PROCEDURE — 6370000000 HC RX 637 (ALT 250 FOR IP): Performed by: SPECIALIST

## 2022-03-25 PROCEDURE — G0378 HOSPITAL OBSERVATION PER HR: HCPCS

## 2022-03-25 PROCEDURE — 96372 THER/PROPH/DIAG INJ SC/IM: CPT

## 2022-03-25 PROCEDURE — 81001 URINALYSIS AUTO W/SCOPE: CPT

## 2022-03-25 PROCEDURE — 6360000002 HC RX W HCPCS

## 2022-03-25 PROCEDURE — 76815 OB US LIMITED FETUS(S): CPT

## 2022-03-25 RX ORDER — MORPHINE SULFATE 10 MG/ML
10 INJECTION, SOLUTION INTRAMUSCULAR; INTRAVENOUS ONCE
Status: COMPLETED | OUTPATIENT
Start: 2022-03-25 | End: 2022-03-25

## 2022-03-25 RX ORDER — ACETAMINOPHEN 325 MG/1
650 TABLET ORAL EVERY 4 HOURS PRN
Status: DISCONTINUED | OUTPATIENT
Start: 2022-03-25 | End: 2022-03-25 | Stop reason: HOSPADM

## 2022-03-25 RX ADMIN — MORPHINE SULFATE 10 MG: 10 INJECTION INTRAVENOUS at 07:54

## 2022-03-25 RX ADMIN — ACETAMINOPHEN 650 MG: 325 TABLET ORAL at 06:24

## 2022-03-25 ASSESSMENT — ENCOUNTER SYMPTOMS
COLOR CHANGE: 0
CHEST TIGHTNESS: 0
COUGH: 0
NAUSEA: 0
CONSTIPATION: 0
VOMITING: 0
WHEEZING: 0
ABDOMINAL DISTENTION: 0
SHORTNESS OF BREATH: 0
DIARRHEA: 0
ABDOMINAL PAIN: 0

## 2022-03-25 ASSESSMENT — PAIN DESCRIPTION - DESCRIPTORS
DESCRIPTORS: CRAMPING
DESCRIPTORS: CRAMPING

## 2022-03-25 ASSESSMENT — PAIN - FUNCTIONAL ASSESSMENT
PAIN_FUNCTIONAL_ASSESSMENT: ACTIVITIES ARE NOT PREVENTED
PAIN_FUNCTIONAL_ASSESSMENT: ACTIVITIES ARE NOT PREVENTED

## 2022-03-25 ASSESSMENT — PAIN SCALES - GENERAL
PAINLEVEL_OUTOF10: 7
PAINLEVEL_OUTOF10: 6
PAINLEVEL_OUTOF10: 7

## 2022-03-25 ASSESSMENT — PAIN DESCRIPTION - FREQUENCY
FREQUENCY: CONTINUOUS
FREQUENCY: INTERMITTENT

## 2022-03-25 ASSESSMENT — PAIN DESCRIPTION - ORIENTATION: ORIENTATION: LOWER;MID

## 2022-03-25 ASSESSMENT — PAIN DESCRIPTION - LOCATION: LOCATION: ABDOMEN

## 2022-03-25 ASSESSMENT — PAIN DESCRIPTION - PAIN TYPE: TYPE: ACUTE PAIN

## 2022-03-25 NOTE — PROGRESS NOTES
PROGRESS NOTE      Patient Name: Mary Ellen Leon  Patient : 1990  Room/Bed: 2441/2862-93  Admission Date/Time: 3/25/2022  4:17 AM  MRN #: [id number]  [facility] #: [id number]    Date: 3/25/2022  Time: 9:51 AM    The patient was seen and examined. Her pain is better and has decreased since she came in. She was able to sleep for the past 1.5 hours. She reports fetal movement is present, complains of occasional contractions, denies loss of fluid, denies vaginal bleeding. Denies s/s of preeclampsia including headache vision changes, difficulty breathing, chest pain, RUQ pain or worsening swelling of extremities. Vital Signs:  VS:  height is 5' (1.524 m) and weight is 189 lb (85.7 kg). Her oral temperature is 98 °F (36.7 °C). Her blood pressure is 130/66 and her pulse is 82. Her respiration is 16 and oxygen saturation is 100%. FHT:    Baseline: 130   Variability: moderate              Accels: present   Decels: Absent    Contraction pattern: irregular    Status of membranes: intact    Pain control: adequate    Assessment/Plan:  Mary Ellen Leon is a 36 y.o. female  at 36w6d admitted for contractions  - Reviewed improvement in pain and EFM/TOCO with Dr. Ferro who is agreeable with discharge at this time. - Reviewed warning signs including vaginal bleeding and decreased fetal movement. - Reviewed signs and symptoms of labor.   - Reviewed discomfort relief measures including belly band, epsom salt baths, and Tylenol as needed. - Reviewed she can take benadryl at night to help with sleep. - Rest and hydration encouraged. - Reviewed keeping prenatal appointment as scheduled with [facility].   - Patient is agreeable with discharge at this time.      Category 1 FHT    Attending: HALEY Rod - ROSENDO  Midwife  3/25/2022, 9:51 AM

## 2022-03-25 NOTE — H&P
[address] and Delivery Triage Note   2022  7:36 AM      SUBJECTIVE:  CHIEF COMPLAINT:  contractions    HISTORY OF PRESENT ILLNESS:      Aidan Anaya is a 36 y.o. female   At 36w7d    Patient presents for evaluation of contractions. Patient presented around 0500 with concerns for contractions. She reports she has had these contractions for several weeks, but they are usually just at night. She reports yesterday these contractions continued through the day and became stronger and more painful. She reports she did not try anything at home for the discomfort. She reports she has been drinking lots of water to see if this would help, but she reports this has not made a difference. She reports nothing makes the contractions worse or better. She reports the contractions come every couple minutes, but she also feels some discomfort in between contractions as well. She declines leaking of fluid or vaginal bleeding. She reports fetal movement is present. Patient reports she received Tylenol at 0630 and this was not helpful. OBJECTIVE:  ESTIMATED DUE DATE:    Estimated Date of Delivery: 22  No LMP recorded. Patient is pregnant.     PRENATAL CARE:  Complicated by:    Patient Active Problem List   Diagnosis    Mood disorder ([facility] [address]    Cannabis abuse    Rh+/RI/GBS neg    Depression     19 F Apg 8/9 Wt 7#10    Subchorionic hematoma, antepartum    Third pregnancy    Pelvic pain    Anemia affecting pregnancy in third trimester    Vaginal discharge during pregnancy, third trimester    32 weeks gestation of pregnancy    Candidiasis of vagina    Pain         PAST MEDICAL HISTORY:     Past Medical History:   Diagnosis Date    Anemia     Depression     TSH deficiency 2019       PAST OB HISTORY:  OB History    Para Term  AB Living   3 2 2 0 0 2   SAB IAB Ectopic Molar Multiple Live Births   0 0 0 0 0 2      # Outcome Date GA Lbr Kevin/2nd Weight Sex Delivery Anes PTL Lv   3 Current            2 Term 19 38w6d 02:07 / 00:05 7 lb 10.8 oz (3.48 kg) F Vag-Spont None N JOAN      Complications: Meconium at birth      Name: Piero Husbands: 8  Apgar5: 9   1 Term 2014 38w1d  6 lb 14 oz (3.118 kg) F Vag-Spont   JOAN       PAST  SURGICAL HISTORY:   No past surgical history on file. SOCIAL HISTORY:   reports that she has never smoked. She has never used smokeless tobacco. She reports previous alcohol use. She reports previous drug use. Drug: Marijuana [place]). MEDICATIONS:  Prior to Admission medications    Medication Sig Start Date End Date Taking? Authorizing Provider   ondansetron (ZOFRAN) 4 MG tablet Take 1 tablet by mouth every 8 hours as needed for Nausea 3/1/22   HALEY Gaitan CNP   [id number]-[id number]-SuccAc-Zn (FERIVA ) 75-1 MG TABS Take 1 tablet by mouth daily 22  HALEY Gaitan CNP   ferrous sulfate (IRON 325) 325 (65 Fe) MG tablet Take 1 tablet by mouth 2 times daily 22   HALEY Edmond NP   Prenatal Vit-Fe Fumarate-FA (PRENATAL VITAMIN) 27-0.8 MG TABS Take 1 tablet by mouth daily 21   HALEY Gaitan CNP        PRENATAL CARE:  Complicated by:    Patient Active Problem List   Diagnosis    Mood disorder ([facility] [address]    Cannabis abuse    Rh+/RI/GBS neg    Depression     19 F Apg 8/9 Wt 7#10    Subchorionic hematoma, antepartum    Third pregnancy    Pelvic pain    Anemia affecting pregnancy in third trimester    Vaginal discharge during pregnancy, third trimester    32 weeks gestation of pregnancy    Candidiasis of vagina    Pain       REVIEW OF SYSTEMS:   Review of Systems   Constitutional: Negative for activity change, chills, diaphoresis, fatigue and fever. HENT: Negative for congestion. Respiratory: Negative for cough, chest tightness, shortness of breath and wheezing. Cardiovascular: Negative for chest pain, palpitations and leg swelling. Gastrointestinal: Negative for abdominal distention, abdominal pain, constipation, diarrhea, nausea and vomiting. Contractions     Genitourinary: Negative for dysuria, frequency, hematuria, urgency, vaginal bleeding, vaginal discharge and vaginal pain. Musculoskeletal: Negative for arthralgias. Skin: Negative for color change. Neurological: Negative for dizziness, speech difficulty, weakness, light-headedness, numbness and headaches. Psychiatric/Behavioral: Negative for agitation, behavioral problems, confusion, dysphoric mood, self-injury and suicidal ideas. The patient is not nervous/anxious. PHYSICAL EXAM:    Vital Signs: Blood pressure 130/66, pulse 82, temperature 98.1 °F (36.7 °C), temperature source Oral, resp. rate 18, SpO2 100 %, not currently breastfeeding. OB History        3    Para   2    Term   2       0    AB   0    Living   2       SAB        IAB        Ectopic        Molar        Multiple   0    Live Births   2             General appearance: alert, appears stated age and cooperative  Skin: Skin color, texture, turgor normal. No rashes or lesions  HEENT: Head: Normal, normocephalic, atraumatic. Neck: no adenopathy, no carotid bruit, no JVD, supple, symmetrical, trachea midline and thyroid not enlarged, symmetric, no tenderness/mass/nodules  Lungs: clear to auscultation bilaterally  Heart: regular rate and rhythm, S1, S2 normal, no murmur, click, rub or gallop  Extremities: extremities normal, atraumatic, no cyanosis or edema  Neurologic: Mental status: Alert, oriented, thought content appropriate    Abdomen:  Abdomen soft, non tender to mild palpation but slightly tender in RLQ and LLQ with deeper palpation, consistent with her EGA. Vertex  lie per Leopold's and BSUS.      Fetal heart rate:  Baseline Heart Rate 135                               Variability: moderate                               Accelerations:  present Declerations: absent    Chaperone: Chanda, RN  Cervix:  Closed thick and high - unchanged from cervical exam at 0500 by Scottie Silva. Contractions:                          Frequency:  Irregular irritability                          Intensity:  Mild on palpation    Membranes:  Intact    BSUS:  Fetal movement: present  Fetal cardiac activity: present  2cm x 2cm DVP: present   Presentation: cephalic     ASSESSMENT/PLAN:    Mike Boyd is a 36 y.o. female  at 36w7d with contractions  - GBS pending / Rh negative / R immune  - Orders per Dr. Yolanda Storey on admission were to monitor patient and recheck cervix at 0700. Dr. Yolanda Storey called after recheck at 0700. Discussed tenderness on exam. Reviewed trying morphine for discomfort and Dr. Yolanda Storey is agreeable with plan. Plan to continue monitoring after morphine administration to assess pain relief. - IM Morphine ordered  - cEFM/TOCO   - VSS  - Urine ordered per Dr. Yolanda Storey and results reviewed. - Will update Dr. Yolanda Storey with pain reassessment.      Category 1 FHT

## 2022-03-25 NOTE — DISCHARGE SUMMARY
Obstetric Discharge Summary  [facility]    Patient Name: Marilyn Rayo  Patient : 1990  Room/Bed: 2321/5123-53  Primary Care Physician: No primary care provider on file. Admit Date: 3/25/2022  4:17 AM  MRN #: [id number]  CSN #: [id number]  OBGYN: [facility]    Principal Diagnosis: IUP at 36w6d, evaluated and seen as Outpatient in Triage for contractions     Her pregnancy has been complicated by:   Patient Active Problem List   Diagnosis    Mood disorder ([address]    Cannabis abuse    Rh+/RI/GBS neg    Depression     19 F Apg  Wt 7#10    Subchorionic hematoma, antepartum    Third pregnancy    Pelvic pain    Anemia affecting pregnancy in third trimester    Vaginal discharge during pregnancy, third trimester    32 weeks gestation of pregnancy    Candidiasis of vagina    Pain       Infection Present?: No        Consultations: none    Pertinent Findings & Procedures:   Marilyn Rayo is a 36 y.o. female  at 36w6d evaluated in outpatient setting for contractions and received cEFM/TOCO, SVE, tylenol, morphine, and urinalysis.          Course of patient: uncomplicated    Discharge to: Home      Recommendations on Discharge:     Medications:        Medication List      ASK your doctor about these medications    FeRiva  75-1 MG Tabs  Take 1 tablet by mouth daily     ferrous sulfate 325 (65 Fe) MG tablet  Commonly known as: IRON 325  Take 1 tablet by mouth 2 times daily     ondansetron 4 MG tablet  Commonly known as: Zofran  Take 1 tablet by mouth every 8 hours as needed for Nausea     Prenatal Vitamin 27-0.8 MG Tabs  Take 1 tablet by mouth daily            Activity: Return to ADLs  Diet: Regular  Follow up: As scheduled with [address] [place] [address] Drive    Condition on discharge: stable    Discharge date: 3/25/22

## 2022-03-25 NOTE — FLOWSHEET NOTE
Discharge teaching and instructions completed. AVS reviewed. Patient voiced understanding regarding follow up appointments and care of self at home. Discharged home ambulatory.

## 2022-03-25 NOTE — FLOWSHEET NOTE
Breastfeeding education information given to patient and she verbalizes understanding about the following:    Skin to Skin Contact for You and Your Baby. Benefits of breastfeeding. Risks of formula given and discussed with mother. What do the experts say about the use of pacifiers/supplementation of a  infant? Questions answered. Mother relates she wants to formula feed.

## 2022-03-25 NOTE — FLOWSHEET NOTE
Spoke to Dr. Billy Quintana about patients arrival and reviewed the following:     Patient states her contractions stared wednesday and that she has been having them every 1-2 minutes. Denies bleeding or leaking of fluids. SVE - closed thick and high. Vital sings WNL. Category one tracing, baseline 130 with occasional contractions. 2/25 GBS result negative with 3/21 GBS pending. Ultrasounds and history.      Verbal orders obtained     Will monitor and recheck in 2 hours  Urine sent     Patient updated on poc

## 2022-03-25 NOTE — PROGRESS NOTES
Attempted to reassess pain after morphine administration, but patient was sleeping. Will allow patient to continue resting at this time and reassess when patient is awake.

## 2022-03-31 ENCOUNTER — ROUTINE PRENATAL (OUTPATIENT)
Dept: OBGYN CLINIC | Age: 32
End: 2022-03-31
Payer: COMMERCIAL

## 2022-03-31 VITALS
BODY MASS INDEX: 36.91 KG/M2 | SYSTOLIC BLOOD PRESSURE: 120 MMHG | HEART RATE: 82 BPM | WEIGHT: 189 LBS | DIASTOLIC BLOOD PRESSURE: 78 MMHG

## 2022-03-31 DIAGNOSIS — Z34.90 THIRD PREGNANCY: ICD-10-CM

## 2022-03-31 DIAGNOSIS — O46.8X9 SUBCHORIONIC HEMATOMA, ANTEPARTUM, SINGLE OR UNSPECIFIED FETUS: ICD-10-CM

## 2022-03-31 DIAGNOSIS — Z3A.37 37 WEEKS GESTATION OF PREGNANCY: Primary | ICD-10-CM

## 2022-03-31 DIAGNOSIS — O41.8X90 SUBCHORIONIC HEMATOMA, ANTEPARTUM, SINGLE OR UNSPECIFIED FETUS: ICD-10-CM

## 2022-03-31 PROCEDURE — 1036F TOBACCO NON-USER: CPT | Performed by: OBSTETRICS & GYNECOLOGY

## 2022-03-31 PROCEDURE — G8427 DOCREV CUR MEDS BY ELIG CLIN: HCPCS | Performed by: OBSTETRICS & GYNECOLOGY

## 2022-03-31 PROCEDURE — 99213 OFFICE O/P EST LOW 20 MIN: CPT | Performed by: OBSTETRICS & GYNECOLOGY

## 2022-03-31 PROCEDURE — G8484 FLU IMMUNIZE NO ADMIN: HCPCS | Performed by: OBSTETRICS & GYNECOLOGY

## 2022-03-31 PROCEDURE — G8419 CALC BMI OUT NRM PARAM NOF/U: HCPCS | Performed by: OBSTETRICS & GYNECOLOGY

## 2022-03-31 NOTE — PROGRESS NOTES
Betzy Calvin is a 36 y.o. female 37w5d        OB History    Para Term  AB Living   3 2 2 0 0 2   SAB IAB Ectopic Molar Multiple Live Births           0 2      # Outcome Date GA Lbr Kevin/2nd Weight Sex Delivery Anes PTL Lv   3 Current            2 Term 19 38w6d 02:07 / 00:05 7 lb 10.8 oz (3.48 kg) F Vag-Spont None N JOAN      Complications: Meconium at birth   1 Term 2014 38w1d  6 lb 14 oz (3.118 kg) F Vag-Spont   JOAN       Vitals  BP: 120/78  Weight: 189 lb (85.7 kg)  Pulse: 82  Patient Position: Sitting  Albumin: Negative  Glucose: Negative      The patient was seen and evaluated. There was positive fetal movements. No contractions or leakage of fluid. Signs and symptoms of labor were reviewed. The S/S of Pre-Eclampsia were reviewed with the patient in detail. She is to report any of these if they occur. She currently denies any of these. The patient was instructed on fetal kick counts and was given a kick sheet to complete every 8 hours. She was instructed that the baby should move at a minimum of ten times within one hour after a meal. The patient was instructed to lay down on her left side twenty minutes after eating and count movements for up to one hour with a target value of ten movements. She was instructed to notify the office if she did not make that target after two attempts or if after any attempt there was less than four movements. The patient reports that the targets have been made Yes.    2/15/22 patient declined tdap vaccine       21  PRAF completed      T-Dap Vaccine Completed (27-36 weeks): No    Allergies: Allergies as of 2022    (No Known Allergies)         Group Beta Strep collection was completed.  Yes  GBS Results:   Admission on 2022, Discharged on 2022   Component Date Value Ref Range Status    Color, UA 2022 Yellow  Yellow Final    Turbidity UA 2022 Clear  Clear Final    Glucose, Ur 2022 NEGATIVE NEGATIVE Final    Bilirubin Urine 2022 NEGATIVE  NEGATIVE Final    Ketones, Urine 2022 NEGATIVE  NEGATIVE Final    Specific [place], UA 2022 1.014  1.000 - 1.030 Final    Urine Hgb 2022 NEGATIVE  NEGATIVE Final    pH, UA 2022 5.5  5.0 - 8.0 Final    Protein, UA 2022 NEGATIVE  NEGATIVE Final    Urobilinogen, Urine 2022 Normal  Normal Final    Nitrite, Urine 2022 NEGATIVE  NEGATIVE Final    Leukocyte Esterase, Urine 2022 TRACE* NEGATIVE Final    WBC, UA 2022 6 TO 9  /HPF Final    RBC, UA 2022 0 TO 2  /HPF Final    Casts UA 2022 0 TO 2  /LPF Final    Epithelial Cells UA 2022 6 TO 9  /HPF Final    Bacteria, UA 2022 FEW* None Final   Hospital Outpatient Visit on 2022   Component Date Value Ref Range Status    Specimen Description 2022 . VAGINA   Final    Culture 2022 NEGATIVE FOR GROUP B STREPTOCOCCI   Final   ]        Cervical Exam was:   Closed/ posterior cm dilated    The literature regarding a questionable link to pitocin augmentation and induction of labor, the assistance of labor contractions and the initiation of contractions to help delivery, have been reviewed with the patient regarding the increased potential of having a  with Attention Deficit Hyperactivity Disorder and or Autism. These two disorders and the ramifications of their impact on a child and the family caring for that child has been reviewed with the patient in detail. She was given the risks, benefits and alternatives of the use of this medication. She has agreed to its use in the delivery of her unborn child if needed at the time of delivery, Yes. The patient was counseled on the mandatory call ahead policy. She has been instructed to call the office at anytime prior to going into the hospital so the on-call physician may direct her to the appropriate facility for care.  Exceptions were reviewed including but not limited to: Decreased fetal movement, vaginal Bleeding or hemorrhage, trauma, readily expectant delivery, or any instance where she feels 911 should be utilized. The patient was counseled on Labor & Delivery. Route of delivery and counseling on vaginal, operative vaginal, and  sections were completed with the risks of each to both the patient as well as her baby. The possibility of a blood transfusion was discussed as well. The patient was not opposed to receiving a transfusion if needed. The patient was counseled on types of analgesia during labor and is considering either Regional or IV medication the risks, benefits and alternatives were discussed.  Testing:  Not indicated  The recommendation to proceed to fetal kick counts every 8 hours daily instead of Non stress testing, (as per Dahiana RODRIGUEZ, HERMELINDA Carreon guidance for Covid-19 testing, American Journal of Obstetrics & Gynecology, 6023)        Assessment:  1Lawanda Rayo is a 36 y.o. female  2. [id number]  3. 37w5d    Patient Active Problem List    Diagnosis Date Noted    Third pregnancy 10/25/2021     Priority: High    Subchorionic hematoma, antepartum 2021     Priority: High     Overview Note:     Pelvic rest/noheavy lifting       Mood disorder ([facility] [address] 2016     Priority: High    Pain 2022    Vaginal discharge during pregnancy, third trimester 2022    32 weeks gestation of pregnancy 2022    Candidiasis of vagina 2022    Anemia affecting pregnancy in third trimester 2022    Pelvic pain 2022     19 F Apg 8/9 Wt 7#10 2019    Depression     Rh+/RI/GBS neg 2019    Cannabis abuse 2016        Diagnosis Orders   1. 37 weeks gestation of pregnancy     2. Subchorionic hematoma, antepartum, single or unspecified fetus     3. Third pregnancy             Plan:  The patient will return to the office for her next visit in 1 weeks.  See antepartum flow sheet. Pt declined IOL at this time wishes to wait until cervix more favorable. Scheduled at [facility] 4/18/2022    Patient was seen with total face to face time of 20 minutes. More than 50% of this visit was on counseling and education regarding her    Diagnosis Orders   1. 37 weeks gestation of pregnancy     2. Subchorionic hematoma, antepartum, single or unspecified fetus     3. Third pregnancy      and her options. She was also counseled on her preventative health maintenance recommendations and follow-up.

## 2022-04-04 ENCOUNTER — HOSPITAL ENCOUNTER (OUTPATIENT)
Age: 32
Setting detail: OBSERVATION
Discharge: HOME | End: 2022-04-04
Attending: OBSTETRICS & GYNECOLOGY | Admitting: OBSTETRICS & GYNECOLOGY
Payer: COMMERCIAL

## 2022-04-04 VITALS
OXYGEN SATURATION: 100 % | RESPIRATION RATE: 16 BRPM | DIASTOLIC BLOOD PRESSURE: 74 MMHG | TEMPERATURE: 98 F | SYSTOLIC BLOOD PRESSURE: 125 MMHG | HEART RATE: 76 BPM

## 2022-04-04 PROBLEM — O46.90 VAGINAL BLEEDING DURING PREGNANCY, ANTEPARTUM: Status: ACTIVE | Noted: 2022-04-04

## 2022-04-04 PROCEDURE — 99213 OFFICE O/P EST LOW 20 MIN: CPT

## 2022-04-04 PROCEDURE — 87480 CANDIDA DNA DIR PROBE: CPT

## 2022-04-04 PROCEDURE — 99234 HOSP IP/OBS SM DT SF/LOW 45: CPT | Performed by: OBSTETRICS & GYNECOLOGY

## 2022-04-04 PROCEDURE — G0378 HOSPITAL OBSERVATION PER HR: HCPCS

## 2022-04-04 PROCEDURE — 36415 COLL VENOUS BLD VENIPUNCTURE: CPT

## 2022-04-04 PROCEDURE — 81003 URINALYSIS AUTO W/O SCOPE: CPT

## 2022-04-04 PROCEDURE — 76815 OB US LIMITED FETUS(S): CPT

## 2022-04-04 PROCEDURE — 87491 CHLMYD TRACH DNA AMP PROBE: CPT

## 2022-04-04 PROCEDURE — 76818 FETAL BIOPHYS PROFILE W/NST: CPT

## 2022-04-04 PROCEDURE — G0379 DIRECT REFER HOSPITAL OBSERV: HCPCS

## 2022-04-04 PROCEDURE — 76818 FETAL BIOPHYS PROFILE W/NST: CPT | Performed by: OBSTETRICS & GYNECOLOGY

## 2022-04-04 PROCEDURE — 87660 TRICHOMONAS VAGIN DIR PROBE: CPT

## 2022-04-04 PROCEDURE — 87510 GARDNER VAG DNA DIR PROBE: CPT

## 2022-04-04 PROCEDURE — 87591 N.GONORRHOEAE DNA AMP PROB: CPT

## 2022-04-04 PROCEDURE — 87086 URINE CULTURE/COLONY COUNT: CPT

## 2022-04-04 PROCEDURE — 99237 HC DIRECT REFERRAL OBSERVATION SURCHARGE: CPT

## 2022-04-05 LAB
C TRACH DNA GENITAL QL NAA+PROBE: NEGATIVE
CULTURE: NORMAL
N. GONORRHOEAE DNA: NEGATIVE
SPECIMEN DESCRIPTION: NORMAL
SPECIMEN DESCRIPTION: NORMAL

## 2022-04-05 NOTE — FLOWSHEET NOTE
Patient admitted to room 167 from ER per wheelchair. Here with c/o spotting. Denies ROM. Denies N/V/D. Denies fever/chills. Relates of + fetal movement. Request for urine sample made. Urine drug screen explained to patient. Instructed on clean catch urine. Consent obtained. Patient verbalizes understanding and acceptance. Assisted into bed, Siderails up x2. Call light in reach. Bed in low position. Oriented to room, surroundings, call system and plan of care. Patient verbalizes understanding. EFM applied and monitor test completed/passed.

## 2022-04-05 NOTE — H&P
OBSTETRICAL HISTORY AND PHYSICAL    Provider:  HALEY HaddadM      Date: 2022  Time: 9:27 PM    Patient Name: Bobbi Wynn  Patient : 1990  Room/Bed: 6910/3526-48  Admission Date/Time: 2022  9:06 PM  MRN #: [id number]  [facility] #: [id number]    Primary Care Physician: No primary care provider on file. Admitting Provider: Dr. Parrish Narayanan is a 36 y.o. female [id number]    CC: Vaginal bleeding     HPI: Bobbi Wynn is a 36 y.o. [place] at 36w4d who presents with concerns for vaginal bleeding. She reports she went to the bathroom about 30 minutes PTA and noticed she had some bleeding when she went to the bathroom and reports some blood when she wiped. She reports there was a small amount on the toilet paper after wiping. She declines recent intercourse. She reports she has not had any vaginal bleeding since then. She reports she was having occasional contractions throughout the day, but these were not consistent or painful. She reports these have stopped at this time. She declines abdominal pain, vaginal discharge, or urinary symptoms. The patient reports fetal movement is present and denies loss of fluid. DATING:  LMP: No LMP recorded. Patient is pregnant. Estimated Date of Delivery: 22   Based on: ultrasound at 9w2d    PREGNANCY RISK FACTORS:  Patient Active Problem List   Diagnosis    Mood disorder ([facility] [address]    Cannabis abuse    Rh+/RI/GBS neg    Depression     19 F Apg 8/9 Wt 7#10    Subchorionic hematoma, antepartum    Third pregnancy    Pelvic pain    Anemia affecting pregnancy in third trimester    Vaginal discharge during pregnancy, third trimester    32 weeks gestation of pregnancy    Candidiasis of vagina    Pain    Vaginal bleeding during pregnancy, antepartum         Allergies:   Allergies as of 2022    (No Known Allergies)       Medications:  No current facility-administered medications on file prior to encounter. Current Outpatient Medications on File Prior to Encounter   Medication Sig Dispense Refill    ondansetron (ZOFRAN) 4 MG tablet Take 1 tablet by mouth every 8 hours as needed for Nausea 30 tablet 1    YiOyq-R62-MD-C-DSS-SuccAc-Zn (FERIVA ) 75-1 MG TABS Take 1 tablet by mouth daily 90 tablet 1    ferrous sulfate (IRON 325) 325 (65 Fe) MG tablet Take 1 tablet by mouth 2 times daily 180 tablet 6    Prenatal Vit-Fe Fumarate-FA (PRENATAL VITAMIN) 27-0.8 MG TABS Take 1 tablet by mouth daily 30 tablet 12          Steroids Given In This Pregnancy:  no     Past Medical History:   Diagnosis Date    Anemia     Depression     TSH deficiency 2019       History reviewed. No pertinent surgical history.     OB History    Para Term  AB Living   3 2 2 0 0 2   SAB IAB Ectopic Molar Multiple Live Births   0 0 0 0 0 2      # Outcome Date GA Lbr Kevin/2nd Weight Sex Delivery Anes PTL Lv   3 Current            2 Term 19 38w6d 02:07 / 00:05 7 lb 10.8 oz (3.48 kg) F Vag-Spont None N JOAN      Complications: Meconium at birth      Name: Ascencion Shows: 8  Apgar5: 9   1 Term 2014 38w1d  6 lb 14 oz (3.118 kg) F Vag-Spont   JOAN       Family History   Problem Relation Age of Onset    Hypertension Other         G.Parents    Depression Mother     Hypertension Mother     Alcohol Abuse Mother     Diabetes Mother     Diabetes Other         G.Parents    Diabetes Brother          Social History     Socioeconomic History    Marital status: Single     Spouse name: Not on file    Number of children: Not on file    Years of education: Not on file    Highest education level: Not on file   Occupational History    Not on file   Tobacco Use    Smoking status: Never Smoker    Smokeless tobacco: Never Used   Vaping Use    Vaping Use: Never used   Substance and Sexual Activity    Alcohol use: Not Currently     Alcohol/week: 0.0 standard drinks     Comment: Social    Drug use: Not Currently     Types: Marijuana [employer]     Comment: occ    Sexual activity: Yes     Partners: Male   Other Topics Concern    Not on file   Social History Narrative    Not on file     Social Determinants of Health     Financial Resource Strain:     Difficulty of Paying Living Expenses: Not on file   Food Insecurity:     Worried About Running Out of Food in the Last Year: Not on file    Doreen of Food in the Last Year: Not on file   Transportation Needs:     Lack of Transportation (Medical): Not on file    Lack of Transportation (Non-Medical): Not on file   Physical Activity:     Days of Exercise per Week: Not on file    Minutes of Exercise per Session: Not on file   Stress:     Feeling of Stress : Not on file   Social Connections:     Frequency of Communication with Friends and Family: Not on file    Frequency of Social Gatherings with Friends and Family: Not on file    Attends Restorationism Services: Not on file    Active Member of [address] [place] [address] or Organizations: Not on file    Attends Club or Organization Meetings: Not on file    Marital Status: Not on file   Intimate Partner Violence:     Fear of Current or Ex-Partner: Not on file    Emotionally Abused: Not on file    Physically Abused: Not on file    Sexually Abused: Not on file   Housing Stability:     Unable to Pay for Housing in the Last Year: Not on file    Number of [place] in the Last Year: Not on file    Unstable Housing in the Last Year: Not on file       Review Of Systems:  A minimum of an eleven point review of systems was completed.     REVIEW OF SYSTEMS:   Constitutional: negative fever, negative chills, negative weight changes   HEENT: negative visual disturbances, negative headaches, negative dizziness, negative hearing loss  Breast: Negative breast abnormalities, negative breast lumps, negative nipple discharge  Respiratory: negative dyspnea, negative cough, negative SOB  Cardiovascular: negative chest pain,  negative palpitations, negative arrhythmia, negative syncope   Gastrointestinal: negative abdominal pain, negative RUQ pain, negative N/V, negative diarrhea, negative constipation, negative bowel changes, negative heartburn   Genitourinary: negative dysuria, negative hematuria, negative urinary incontinence, negative vaginal discharge, positive vaginal bleeding or spotting. + occasional contractions. Dermatological: negative rash, negative pruritis, negative mole or other skin changes  Hematologic: negative bruising  Immunologic/Lymphatic: negative recent illness, negative recent sick contact  Musculoskeletal: negative back pain, negative myalgias, negative arthralgias  Neurological:  negative dizziness, negative migraines, negative seizures, negative weakness  Behavior/Psych: negative depression, negative anxiety, negative SI, negative HI    PHYSICAL EXAM:    Vital Signs: Blood pressure 125/74, pulse 76, SpO2 100 %, not currently breastfeeding. OB History        3    Para   2    Term   2       0    AB   0    Living   2       SAB        IAB        Ectopic        Molar        Multiple   0    Live Births   2             General appearance: alert, appears stated age and cooperative  Skin: Skin color, texture, turgor normal. No rashes or lesions  HEENT: Head: Normal, normocephalic, atraumatic. Neck: no adenopathy, no carotid bruit, no JVD, supple, symmetrical, trachea midline and thyroid not enlarged, symmetric, no tenderness/mass/nodules  Lungs: clear to auscultation bilaterally  Heart: regular rate and rhythm, S1, S2 normal, no murmur, click, rub or gallop  Extremities: extremities normal, atraumatic, no cyanosis or edema  Neurologic: Mental status: Alert, oriented, thought content appropriate    Abdomen:  Abdomen soft, non tender, consistent with her EGA. Vertex lie per Leopold's.     Fetal heart rate:  Baseline Heart Rate: 125                               Variability: moderate                               Accelerations:  present                               Declerations: absent    Cervix:  Closed, thick, posterior    Contractions: Irregular     Membranes:  Intact    Speculum exam: Cervix visually closed without active bleeding. LIMITED BEDSIDE US:  Position: Cephalic  Placental Location: anterior  Fetal Heart Tones: Present  Fetal Movement: Present  Amniotic Fluid Index/Volume: adequate 2x2 cm fluid pocket        ASSESSMENT/PLAN:  Carmine Darnell is a 36 y.o. female   At 36w4d  Admit to Triage for: vaginal bleeding  - cEFM/[employer]   - Diet: regular  - VSS, afebrile  - Plan: vaginitis probe, urinalysis, and GC/CT collected. - Sterile speculum exam performed with no active bleeding.   - Dr. Maurice Garcia to come and perform BPP. FHR: Category 1    Plan discussed with Dr. Maurice Garcia, who is agreeable.      HALEY Park CNM  Midwife ALBERTINA  2022, 9:27 PM

## 2022-04-05 NOTE — PROGRESS NOTES
Vaginitis swab and urine results were negative. Patient updated on results and Dr. Georgie Freed notified. Category 1 FHT  Fetal heart rate: Baseline heart rate: 120    Variability: moderate    Accelerations: present    Decelerations: absent    Contractions:     Frequency: 3-6 minutes    Duration: 40-60 seconds    Intensity: mild    Patient awaiting for BPP to be performed by Dr. Georgie Freed. Patient updated on plan of care and is agreeable. Dr. Georgie Freed to resume care at 2300.

## 2022-04-05 NOTE — DISCHARGE SUMMARY
Obstetric Discharge Summary    Patient Name: Eric Spann  Patient : 1990  Room/Bed: [facility]8576-98  Primary Care Physician: No primary care provider on file. Admit Date: 2022  9:06 PM  MRN #: [id number]  [facility] #: [id number]    Admitting Diagnosis  Eric Spann is a 36 y.o. female  at 36w4d  Admitting DX: vaginal discharge  OB History        3    Para   2    Term   2       0    AB   0    Living   2       SAB        IAB        Ectopic        Molar        Multiple   0    Live Births   2              Patient Active Problem List   Diagnosis    Mood disorder ([facility] [address]    Cannabis abuse    Rh+/RI/GBS neg    Depression     19 F Apg  Wt 7#10    Subchorionic hematoma, antepartum    Third pregnancy    Pelvic pain    Anemia affecting pregnancy in third trimester    Vaginal discharge during pregnancy, third trimester    32 weeks gestation of pregnancy    Candidiasis of vagina    Pain    Vaginal bleeding during pregnancy, antepartum         Reasons for Admission on 2022  9:06 PM  Vaginal bleeding during pregnancy, antepartum [O46.90]   No bleeding on exam. C/W loss of mucous plug  BPP 10/10                            Discharge Diagnosis:  1Lawanda Spann is a 36 y.o. female  2.   3. 38w2d  4. Discharge home with [address]. S/Sx labor reviewed. Call ahead policy d/w patient    Discharge Information/Patient Instructions:     Medication List      ASK your doctor about these medications    FeRiva  75-1 MG Tabs  Take 1 tablet by mouth daily     ferrous sulfate 325 (65 Fe) MG tablet  Commonly known as: IRON 325  Take 1 tablet by mouth 2 times daily     ondansetron 4 MG tablet  Commonly known as: Zofran  Take 1 tablet by mouth every 8 hours as needed for Nausea     Prenatal Vitamin 27-0.8 MG Tabs  Take 1 tablet by mouth daily            No discharge procedures on file.     Activity: activity as tolerated  Diet: regular diet      Discharge to: Home  Follow up in 3 days Discharge Date: 4/4/2022      Vennie Kehr, DO        Home care, Follow-up care and birth control were reviewed. Signs and symptoms of mastitis and Post Partum Depression were reviewed. The patient is to notify her physician if any of these occur. The patient was counseled on secondary smoke risks and the increased risk of sudden infant death syndrome and respiratory problems to her baby with exposure. She was counseled on various alternate recommendations to decrease the exposure to secondary smoke to her children.

## 2022-04-05 NOTE — PROGRESS NOTES
Department of Obstetrics and Gynecology  Labor and Delivery Triage Note  Facility: [address]      Patient Name: Jeannie Redd  Patient : 1990  Room/Bed: 854280-  Admission Date/Time: 2022  9:06 PM  Primary Care Physician: No primary care provider on file. MRN #: [id number]  CSN #: [id number]        Date: 2022  Time: 11:13 PM        CHIEF COMPLAINT:  vaginal discharge, mucous with small streak of blood. HISTORY OF PRESENT ILLNESS:    Jeannie Redd is a 36 y.o. female  at 36w4d. OB History    Para Term  AB Living   3 2 2 0 0 2   SAB IAB Ectopic Molar Multiple Live Births           0 2      # Outcome Date GA Lbr Kevin/2nd Weight Sex Delivery Anes PTL Lv   3 Current            2 Term 19 38w6d 02:07 / 00:05 7 lb 10.8 oz (3.48 kg) F Vag-Spont None N JOAN      Complications: Meconium at birth   1 Term 2014 38w1d  6 lb 14 oz (3.118 kg) F Vag-Spont   JOAN        Patient presents with c/o vaginal discharge mucousy with streak of blood. Pt denies. LOF. +FM. Occasional Cxs. Fetal Movement: normal.    REVIEW OF SYSTEMS:   A comprehensive review of systems was negative. .  Denies ROM/ LOF.  No further bleeding spotting just once with mucous discharge with wiping    Estimated Due Date:  Estimated Date of Delivery: 22    PRENATAL CARE:  Patient Active Problem List    Diagnosis Date Noted    Third pregnancy 10/25/2021     Priority: High    Subchorionic hematoma, antepartum 2021     Priority: High     Overview Note:     Pelvic rest/noheavy lifting       Mood disorder ([facility] [address] 2016     Priority: High    Vaginal bleeding during pregnancy, antepartum 2022    Pain 2022    Vaginal discharge during pregnancy, third trimester 2022    32 weeks gestation of pregnancy 2022    Candidiasis of vagina 2022    Anemia affecting pregnancy in third trimester 2022    Pelvic pain 2022     19 F Apg 8/9 Wt 7#10 2019    Depression     Rh+/RI/GBS neg 2019    Cannabis abuse          Complicated by: none    PAST MEDICAL HISTORY:   Past Medical History:   Diagnosis Date    Anemia     Depression     TSH deficiency 2019       PAST  SURGICAL HISTORY: History reviewed. No pertinent surgical history. SOCIAL HISTORY:  reports that she has never smoked. She has never used smokeless tobacco. She reports previous alcohol use. She reports previous drug use. Drug: Marijuana Harper Noe). MEDICATIONS:   Prior to Admission medications    Medication Sig Start Date End Date Taking? Authorizing Provider   ondansetron (ZOFRAN) 4 MG tablet Take 1 tablet by mouth every 8 hours as needed for Nausea 3/1/22   HALEY Chirinos CNP   AlTlj-F22-WD-C-DSS-SuccAc-Zn (FERIVA ) 75-1 MG TABS Take 1 tablet by mouth daily 22  HALEY Chirinos - CNP   ferrous sulfate (IRON 325) 325 (65 Fe) MG tablet Take 1 tablet by mouth 2 times daily 22   HALEY Huber NP   Prenatal Vit-Fe Fumarate-FA (PRENATAL VITAMIN) 27-0.8 MG TABS Take 1 tablet by mouth daily 21   HALEY Chirinos CNP        ALLERGIES:   Allergies as of 2022    (No Known Allergies)             PHYSICAL EXAM:    Vital Signs:   Vitals:    22 2113   BP: 125/74   Pulse: 76   Resp: 16   Temp: 98 °F (36.7 °C)   TempSrc: Oral   SpO2: 100%       Fetal heart rate:  Baseline Heart Rate 120, accelerations:  present  Contraction frequency:  none  Category: 1 Tracing    OB History        3    Para   2    Term   2       0    AB   0    Living   2       SAB        IAB        Ectopic        Molar        Multiple   0    Live Births   2             General appearance: alert, appears stated age and cooperative  Skin: Skin color, texture, turgor normal. No rashes or lesions  HEENT: Head: Normocephalic, no lesions, without obvious abnormality.   Neck: no adenopathy, no carotid bruit, no JVD, supple, symmetrical, trachea midline and thyroid not enlarged, symmetric, no tenderness/mass/nodules  Lungs: clear to auscultation bilaterally  Heart: regular rate and rhythm, S1, S2 normal, no murmur, click, rub or gallop  Abdomen: soft, non-tender; bowel sounds normal; no masses,  no organomegaly   Uterus soft and nontender. No S/Sx chorio. No S/Sx abruption  Extremities: extremities normal, atraumatic, no cyanosis or edema  Neurologic: Mental status: Alert, oriented, thought content appropriate      Cervix:  Closed 25% medium -2    Membranes:  Intact  Speculum Exam: no blood in vault. Normal discharge    Lab Results:   Blood Type/Rh:    ABO/Rh   Date Value Ref Range Status   09/23/2021 O POSITIVE  Final     Antibody Screen:    Antibody Screen   Date Value Ref Range Status   09/23/2021 NEGATIVE  Final     Hemoglobin:   Hemoglobin   Date Value Ref Range Status   01/31/2022 10.8 (L) 12.0 - 16.0 g/dL Final     Hematocrit:   Hematocrit   Date Value Ref Range Status   01/31/2022 32.2 (L) 36 - 46 % Final     Platelet Count:   Platelet Count   Date Value Ref Range Status   10/04/2011 336 150 - 450 k/uL Final     Platelets   Date Value Ref Range Status   01/31/2022 481 (H) 150 - 450 k/uL Final     Rubella:    Rubella Antibody, IGG   Date Value Ref Range Status   09/23/2021 63.4 IU/mL Final     Comment:                 REFERENCE RANGE:  <5.0       NON-REACTIVE (non-immune)  5.0 TO 9.9 EQUIVOCAL  >=10.0     REACTIVE     (immune)             T. Pallidium, IGG:    T. pallidum, IgG   Date Value Ref Range Status   09/23/2021 NONREACTIVE NONREACTIVE Final     Comment:           T. pallidum antibodies are not detected. There is no serological evidence of infection with T. pallidum (early primary syphilis   cannot be excluded). Retest in 2-4 weeks if syphilis is clinically suspect.              Sickle Cell Screen: No results found for: SICKLE  Hepatitis B Surface Antigen:   Hepatitis B Surface Ag   Date Value Ref Range Status   09/23/2021 NONREACTIVE NONREACTIVE Final     HIV:    HIV 1/2 Antibody   Date Value Ref Range Status   04/10/2014 NONREACTIVE NR Final     Comment:                   Interpretation:         The presence of antibody to HIV and its association with the potential   infectivity, transmission or diagnosis of AIDS has not been established. Furthermore, a 'Non-Reactive' test result does not exclude the possibility of   exposure to or infection with HIV. If the above test result is 'Reactive', the Laboratory will order the   confirmatory test.  Performed at [address] [place] [id number] ([phone]         1 hour Glucose Tolerance Test:   Glucose   Date Value Ref Range Status   11/02/2021 91 70 - 99 mg/dL Final   10/04/2011 84 74 - 106 mg/dL Final     Glucose tolerance screen 50g   Date Value Ref Range Status   01/31/2022 108 70 - 135 mg/dL Final       Glucose Fasting:   Glucose, Fasting   Date Value Ref Range Status   05/15/2019 87 65 - 94 mg/dL Final     1 hour Glucose Tolerance Test:   Glucose, GTT - 1 Hour   Date Value Ref Range Status   05/15/2019 147 65 - 179 mg/dL Final     2 hour Glucose Tolerance Test:   Glucose, GTT - 2 Hour   Date Value Ref Range Status   05/15/2019 109 65 - 154 mg/dL Final         Results for orders placed or performed during the hospital encounter of 04/04/22   Vaginitis DNA Probe    Specimen: Vaginal   Result Value Ref Range    Source . VAGINAL SWAB     Trichomonas Vaginalis DNA NEGATIVE NEGATIVE    GARDNERELLA VAGINALIS, DNA PROBE NEGATIVE NEGATIVE    CANDIDA SPECIES, DNA PROBE NEGATIVE NEGATIVE   Urinalysis   Result Value Ref Range    Color, UA Yellow Yellow    Turbidity UA Clear Clear    Glucose, Ur NEGATIVE NEGATIVE    Bilirubin Urine NEGATIVE NEGATIVE    Ketones, Urine NEGATIVE NEGATIVE    Specific Gravity, UA 1.006 1.000 - 1.030    Urine Hgb NEGATIVE NEGATIVE    pH, UA 6.5 5.0 - 8.0    Protein, UA NEGATIVE NEGATIVE    Urobilinogen, Urine Normal Normal    Nitrite, Urine NEGATIVE NEGATIVE Leukocyte Esterase, Urine NEGATIVE NEGATIVE    Urinalysis Comments       Microscopic exam not performed based on chemical results unless requested in original order. Limited bedside sono: cephalic. +mvt +tone +breathing. LACIE 126 mm with > 2x2 pocket  Placenta anterior No S/ Sx abruption No retroplacental clots    ASSESSMENT:  1. Porter Bravo is a 36 y.o. female  at 36w4d. 2. Vaginal discharge c/w mucous plug  Patient Active Problem List    Diagnosis Date Noted    Third pregnancy 10/25/2021     Priority: High    Subchorionic hematoma, antepartum 2021     Priority: High     Pelvic rest/noheavy lifting       Mood disorder ([facility] [address] 2016     Priority: High    Vaginal bleeding during pregnancy, antepartum 2022    Pain 2022    Vaginal discharge during pregnancy, third trimester 2022    32 weeks gestation of pregnancy 2022    Candidiasis of vagina 2022    Anemia affecting pregnancy in third trimester 2022    Pelvic pain 2022     19 F Apg 8/9 Wt 7#10 2019    Depression     Rh+/RI/GBS neg 2019    Cannabis abuse 2016      4. BPP 10/10 Category 1 FHR tracing        PLAN:   Monitored for contractions - findings: no contractions noted. Orders: no treatment necessary. Disposition:  Patient discharged home and instructed on symptoms of labor, rupture of membranes and fetal movement  Medications: [unfilled]   F/U Instructions: in 3 days, or sooner should symptoms worsen      Home care and follow up was reviewed with the patient. The patient was counseled on the signs and symptoms of  labor and labor precautions. For all patients over 28 weeks gestation, Kick sheet parameters every 8 hours were reviewed and recommended. Pelvic rest was recommended for this patient.     Krish Ambriz,   2022

## 2022-04-05 NOTE — FLOWSHEET NOTE
Discharge teaching and instructions completed. AVS reviewed. Understanding verbalized. Patient voiced understanding regarding follow up appointments and care of self at home. Discharged home ambulatory.

## 2022-04-07 ENCOUNTER — ROUTINE PRENATAL (OUTPATIENT)
Dept: OBGYN CLINIC | Age: 32
End: 2022-04-07
Payer: COMMERCIAL

## 2022-04-07 VITALS
BODY MASS INDEX: 37.3 KG/M2 | SYSTOLIC BLOOD PRESSURE: 112 MMHG | HEART RATE: 104 BPM | WEIGHT: 191 LBS | DIASTOLIC BLOOD PRESSURE: 84 MMHG

## 2022-04-07 DIAGNOSIS — Z34.90 THIRD PREGNANCY: ICD-10-CM

## 2022-04-07 DIAGNOSIS — O46.8X9 SUBCHORIONIC HEMATOMA, ANTEPARTUM, SINGLE OR UNSPECIFIED FETUS: ICD-10-CM

## 2022-04-07 DIAGNOSIS — O41.8X90 SUBCHORIONIC HEMATOMA, ANTEPARTUM, SINGLE OR UNSPECIFIED FETUS: ICD-10-CM

## 2022-04-07 DIAGNOSIS — Z3A.38 38 WEEKS GESTATION OF PREGNANCY: Primary | ICD-10-CM

## 2022-04-07 PROCEDURE — 99213 OFFICE O/P EST LOW 20 MIN: CPT | Performed by: OBSTETRICS & GYNECOLOGY

## 2022-04-07 PROCEDURE — G8419 CALC BMI OUT NRM PARAM NOF/U: HCPCS | Performed by: OBSTETRICS & GYNECOLOGY

## 2022-04-07 PROCEDURE — G8427 DOCREV CUR MEDS BY ELIG CLIN: HCPCS | Performed by: OBSTETRICS & GYNECOLOGY

## 2022-04-07 PROCEDURE — 1036F TOBACCO NON-USER: CPT | Performed by: OBSTETRICS & GYNECOLOGY

## 2022-04-07 NOTE — PROGRESS NOTES
Cintia Smith is a 36 y.o. female 38w5d        OB History    Para Term  AB Living   3 2 2 0 0 2   SAB IAB Ectopic Molar Multiple Live Births           0 2      # Outcome Date GA Lbr Kevin/2nd Weight Sex Delivery Anes PTL Lv   3 Current            2 Term 19 38w6d 02:07 / 00:05 7 lb 10.8 oz (3.48 kg) F Vag-Spont None N JOAN      Complications: Meconium at birth   1 Term 2014 38w1d  6 lb 14 oz (3.118 kg) F Vag-Spont   JOAN       Vitals  BP: 112/84  Weight: 191 lb (86.6 kg)  Pulse: 104  Patient Position: Sitting  Albumin: Negative  Glucose: Negative      The patient was seen and evaluated. There was positive fetal movements. No contractions or leakage of fluid. Signs and symptoms of labor were reviewed. The S/S of Pre-Eclampsia were reviewed with the patient in detail. She is to report any of these if they occur. She currently denies any of these. The patient was instructed on fetal kick counts and was given a kick sheet to complete every 8 hours. She was instructed that the baby should move at a minimum of ten times within one hour after a meal. The patient was instructed to lay down on her left side twenty minutes after eating and count movements for up to one hour with a target value of ten movements. She was instructed to notify the office if she did not make that target after two attempts or if after any attempt there was less than four movements. The patient reports that the targets have been made Yes.    2/15/22 patient declined tdap vaccine       21  PRAF completed      T-Dap Vaccine Completed (27-36 weeks): No    Allergies: Allergies as of 2022    (No Known Allergies)         Group Beta Strep collection was completed.  Yes  GBS Results:   Admission on 2022, Discharged on 2022   Component Date Value Ref Range Status    Color, UA 2022 Yellow  Yellow Final    Turbidity UA 2022 Clear  Clear Final    Glucose, Ur 2022 NEGATIVE NEGATIVE Final    Bilirubin Urine 04/04/2022 NEGATIVE  NEGATIVE Final    Ketones, Urine 04/04/2022 NEGATIVE  NEGATIVE Final    Specific Gravity, UA 04/04/2022 1.006  1.000 - 1.030 Final    Urine Hgb 04/04/2022 NEGATIVE  NEGATIVE Final    pH, UA 04/04/2022 6.5  5.0 - 8.0 Final    Protein, UA 04/04/2022 NEGATIVE  NEGATIVE Final    Urobilinogen, Urine 04/04/2022 Normal  Normal Final    Nitrite, Urine 04/04/2022 NEGATIVE  NEGATIVE Final    Leukocyte Esterase, Urine 04/04/2022 NEGATIVE  NEGATIVE Final    Urinalysis Comments 04/04/2022 Microscopic exam not performed based on chemical results unless requested in original order. Final    Specimen Description 04/04/2022 . CLEAN CATCH URINE   Final    Culture 04/04/2022 NO SIGNIFICANT GROWTH   Final    Source 04/04/2022 . VAGINAL SWAB   Final    Trichomonas Vaginalis DNA 04/04/2022 NEGATIVE  NEGATIVE Final    for Trichomonas Vaginalis    GARDNERELLA VAGINALIS, DNA PROBE 04/04/2022 NEGATIVE  NEGATIVE Final    for Gardnerella vaginalis    CANDIDA SPECIES, DNA PROBE 04/04/2022 NEGATIVE  NEGATIVE Final    Comment: for Candida sp. Method of testing is a DNA probe intended for detection and identification of Candida   species, Gardnerella vaginalis, and Trichomonas vaginalis nucleic acid in vaginal fluid   specimens from patients with symptoms of vaginitis/vaginosis.  Specimen Description 04/04/2022 . CERVIX   Final    C. trachomatis DNA 04/04/2022 NEGATIVE  NEGATIVE Final    Comment: CHLAMYDIA TRACHOMATIS DNA not detected by nucleic acid amplification. This test is intended for medical purposes only and is not valid for the evaluation of   suspected sexual abuse or for other forensic purposes. In certain contexts, culture may be required to meet applicable laws and regulations for   diagnosis of C. trachomatis and N. gonorrhoeae infections.   Per 2014  CDC recommendations, this test does not include confirmation of positive results   by an alternative nucleic acid target.  N. gonorrhoeae DNA 04/04/2022 NEGATIVE  NEGATIVE Final    Comment: NEISSERIA GONORRHOEAE DNA not detected by nucleic acid amplification. This test is intended for medical purposes only and is not valid for the evaluation of   suspected sexual abuse or for other forensic purposes. In certain contexts, culture may be required to meet applicable laws and regulations for   diagnosis of C. trachomatis and N. gonorrhoeae infections. Per 2014  CDC recommendations, this test does not include confirmation of positive results   by an alternative nucleic acid target. Admission on 03/25/2022, Discharged on 03/25/2022   Component Date Value Ref Range Status    Color, UA 03/25/2022 Yellow  Yellow Final    Turbidity UA 03/25/2022 Clear  Clear Final    Glucose, Ur 03/25/2022 NEGATIVE  NEGATIVE Final    Bilirubin Urine 03/25/2022 NEGATIVE  NEGATIVE Final    Ketones, Urine 03/25/2022 NEGATIVE  NEGATIVE Final    Specific [place], UA 03/25/2022 1.014  1.000 - 1.030 Final    Urine Hgb 03/25/2022 NEGATIVE  NEGATIVE Final    pH, UA 03/25/2022 5.5  5.0 - 8.0 Final    Protein, UA 03/25/2022 NEGATIVE  NEGATIVE Final    Urobilinogen, Urine 03/25/2022 Normal  Normal Final    Nitrite, Urine 03/25/2022 NEGATIVE  NEGATIVE Final    Leukocyte Esterase, Urine 03/25/2022 TRACE* NEGATIVE Final    WBC, UA 03/25/2022 6 TO 9  /HPF Final    RBC, UA 03/25/2022 0 TO 2  /HPF Final    Casts UA 03/25/2022 0 TO 2  /LPF Final    Epithelial Cells UA 03/25/2022 6 TO 9  /HPF Final    Bacteria, UA 03/25/2022 FEW* None Final   Hospital Outpatient Visit on 03/21/2022   Component Date Value Ref Range Status    Specimen Description 03/21/2022 . VAGINA   Final    Culture 03/21/2022 NEGATIVE FOR GROUP B STREPTOCOCCI   Final   ]        Cervical Exam was:   Fingertip/ posterior cm dilated    The literature regarding a questionable link to pitocin augmentation and induction of labor, the assistance of labor contractions and the initiation of contractions to help delivery, have been reviewed with the patient regarding the increased potential of having a  with Attention Deficit Hyperactivity Disorder and or Autism. These two disorders and the ramifications of their impact on a child and the family caring for that child has been reviewed with the patient in detail. She was given the risks, benefits and alternatives of the use of this medication. She has agreed to its use in the delivery of her unborn child if needed at the time of delivery, Yes. The patient was counseled on the mandatory call ahead policy. She has been instructed to call the office at anytime prior to going into the hospital so the on-call physician may direct her to the appropriate facility for care. Exceptions were reviewed including but not limited to: Decreased fetal movement, vaginal Bleeding or hemorrhage, trauma, readily expectant delivery, or any instance where she feels 911 should be utilized. The patient was counseled on Labor & Delivery. Route of delivery and counseling on vaginal, operative vaginal, and  sections were completed with the risks of each to both the patient as well as her baby. The possibility of a blood transfusion was discussed as well. The patient was not opposed to receiving a transfusion if needed. The patient was counseled on types of analgesia during labor and is considering either Regional or IV medication the risks, benefits and alternatives were discussed.  Testing:  Not indicated  The recommendation to proceed to fetal kick counts every 8 hours daily instead of Non stress testing, (as per Dahiana RODRIGUEZ, HERMELINDA Sanchez guidance for Covid-19 testing, American Journal of Obstetrics & Gynecology, 5415)        Assessment:  1Lawanda Caballero is a 36 y.o. female  2.  [id number]  3. 38w5d    Patient Active Problem List    Diagnosis Date Noted    Third pregnancy 10/25/2021     Priority: High    Subchorionic hematoma, antepartum 2021     Priority: High     Overview Note:     Pelvic rest/noheavy lifting       Mood disorder ([facility] [address] 2016     Priority: High    Vaginal bleeding during pregnancy, antepartum 2022    Pain 2022    Vaginal discharge during pregnancy in third trimester 2022    32 weeks gestation of pregnancy 2022    Candidiasis of vagina 2022    Anemia affecting pregnancy in third trimester 2022    Pelvic pain 2022     19 F Apg 8/9 Wt 7#10 2019    Depression     Rh+/RI/GBS neg 2019    Cannabis abuse 2016        Diagnosis Orders   1. 38 weeks gestation of pregnancy     2. Subchorionic hematoma, antepartum, single or unspecified fetus     3. Third pregnancy             Plan:  The patient will return to the office for her next visit in 1 weeks. See antepartum flow sheet. Patient was seen with total face to face time of 20 minutes. More than 50% of this visit was on counseling and education regarding her    Diagnosis Orders   1. 38 weeks gestation of pregnancy     2. Subchorionic hematoma, antepartum, single or unspecified fetus     3. Third pregnancy      and her options. She was also counseled on her preventative health maintenance recommendations and follow-up.

## 2022-04-13 ENCOUNTER — ROUTINE PRENATAL (OUTPATIENT)
Dept: OBGYN CLINIC | Age: 32
End: 2022-04-13
Payer: COMMERCIAL

## 2022-04-13 VITALS
SYSTOLIC BLOOD PRESSURE: 116 MMHG | BODY MASS INDEX: 37.69 KG/M2 | WEIGHT: 193 LBS | DIASTOLIC BLOOD PRESSURE: 76 MMHG | HEART RATE: 84 BPM

## 2022-04-13 DIAGNOSIS — O99.013 ANEMIA AFFECTING PREGNANCY IN THIRD TRIMESTER: ICD-10-CM

## 2022-04-13 DIAGNOSIS — O46.8X9 SUBCHORIONIC HEMATOMA, ANTEPARTUM, FETUS 1 OF MULTIPLE GESTATION: Primary | ICD-10-CM

## 2022-04-13 DIAGNOSIS — Z34.90 THIRD PREGNANCY: ICD-10-CM

## 2022-04-13 DIAGNOSIS — Z3A.39 39 WEEKS GESTATION OF PREGNANCY: ICD-10-CM

## 2022-04-13 DIAGNOSIS — O41.8X91 SUBCHORIONIC HEMATOMA, ANTEPARTUM, FETUS 1 OF MULTIPLE GESTATION: Primary | ICD-10-CM

## 2022-04-13 PROCEDURE — G8427 DOCREV CUR MEDS BY ELIG CLIN: HCPCS | Performed by: NURSE PRACTITIONER

## 2022-04-13 PROCEDURE — G8419 CALC BMI OUT NRM PARAM NOF/U: HCPCS | Performed by: NURSE PRACTITIONER

## 2022-04-13 PROCEDURE — 1036F TOBACCO NON-USER: CPT | Performed by: NURSE PRACTITIONER

## 2022-04-13 PROCEDURE — 99213 OFFICE O/P EST LOW 20 MIN: CPT | Performed by: NURSE PRACTITIONER

## 2022-04-13 NOTE — PROGRESS NOTES
Cassius Lo is a 36 y.o. female 39w4d        OB History    Para Term  AB Living   3 2 2 0 0 2   SAB IAB Ectopic Molar Multiple Live Births           0 2      # Outcome Date GA Lbr Kevin/2nd Weight Sex Delivery Anes PTL Lv   3 Current            2 Term 19 38w6d 02:07 / 00:05 7 lb 10.8 oz (3.48 kg) F Vag-Spont None N JOAN      Complications: Meconium at birth   1 Term 2014 38w1d  6 lb 14 oz (3.118 kg) F Vag-Spont   JOAN       Vitals  BP: 116/76  Weight: 193 lb (87.5 kg)  Pulse: 84  Patient Position: Sitting  Albumin: Negative  Glucose: Negative  Fundal Height (cm): 39 cm  Fetal Heart Rate: 135  Movement: Present  Presentation: Vertex  Dilation (cm): Fingertip  Effacement: 60  Station: -2      The patient was seen and evaluated. There was positive fetal movements. No contractions or leakage of fluid. Signs and symptoms of labor were reviewed. The S/S of Pre-Eclampsia were reviewed with the patient in detail. She is to report any of these if they occur. She currently denies any of these. The patient was instructed on fetal kick counts and was given a kick sheet to complete every 8 hours. She was instructed that the baby should move at a minimum of ten times within one hour after a meal. The patient was instructed to lay down on her left side twenty minutes after eating and count movements for up to one hour with a target value of ten movements. She was instructed to notify the office if she did not make that target after two attempts or if after any attempt there was less than four movements. The patient reports that the targets have been made Yes.    2/15/22 patient declined tdap vaccine       21  PRAF completed      T-Dap Vaccine Completed (27-36 weeks): No    Allergies: Allergies as of 2022    (No Known Allergies)         Group Beta Strep collection was completed.  Yes  GBS Results:   Admission on 2022, Discharged on 2022   Component Date Value Ref Range Status    Color, UA 04/04/2022 Yellow  Yellow Final    Turbidity UA 04/04/2022 Clear  Clear Final    Glucose, Ur 04/04/2022 NEGATIVE  NEGATIVE Final    Bilirubin Urine 04/04/2022 NEGATIVE  NEGATIVE Final    Ketones, Urine 04/04/2022 NEGATIVE  NEGATIVE Final    Specific Gravity, UA 04/04/2022 1.006  1.000 - 1.030 Final    Urine Hgb 04/04/2022 NEGATIVE  NEGATIVE Final    pH, UA 04/04/2022 6.5  5.0 - 8.0 Final    Protein, UA 04/04/2022 NEGATIVE  NEGATIVE Final    Urobilinogen, Urine 04/04/2022 Normal  Normal Final    Nitrite, Urine 04/04/2022 NEGATIVE  NEGATIVE Final    Leukocyte Esterase, Urine 04/04/2022 NEGATIVE  NEGATIVE Final    Urinalysis Comments 04/04/2022 Microscopic exam not performed based on chemical results unless requested in original order. Final    Specimen Description 04/04/2022 . CLEAN CATCH URINE   Final    Culture 04/04/2022 NO SIGNIFICANT GROWTH   Final    Source 04/04/2022 . VAGINAL SWAB   Final    Trichomonas Vaginalis DNA 04/04/2022 NEGATIVE  NEGATIVE Final    for Trichomonas Vaginalis    GARDNERELLA VAGINALIS, DNA PROBE 04/04/2022 NEGATIVE  NEGATIVE Final    for Gardnerella vaginalis    CANDIDA SPECIES, DNA PROBE 04/04/2022 NEGATIVE  NEGATIVE Final    Comment: for Candida sp. Method of testing is a DNA probe intended for detection and identification of Candida   species, Gardnerella vaginalis, and Trichomonas vaginalis nucleic acid in vaginal fluid   specimens from patients with symptoms of vaginitis/vaginosis.  Specimen Description 04/04/2022 . CERVIX   Final    C. trachomatis DNA 04/04/2022 NEGATIVE  NEGATIVE Final    Comment: CHLAMYDIA TRACHOMATIS DNA not detected by nucleic acid amplification. This test is intended for medical purposes only and is not valid for the evaluation of   suspected sexual abuse or for other forensic purposes.   In certain contexts, culture may be required to meet applicable laws and regulations for   diagnosis of C. trachomatis and N. gonorrhoeae infections. Per 2014  CDC recommendations, this test does not include confirmation of positive results   by an alternative nucleic acid target.  N. gonorrhoeae DNA 04/04/2022 NEGATIVE  NEGATIVE Final    Comment: NEISSERIA GONORRHOEAE DNA not detected by nucleic acid amplification. This test is intended for medical purposes only and is not valid for the evaluation of   suspected sexual abuse or for other forensic purposes. In certain contexts, culture may be required to meet applicable laws and regulations for   diagnosis of C. trachomatis and N. gonorrhoeae infections. Per 2014  CDC recommendations, this test does not include confirmation of positive results   by an alternative nucleic acid target.      Admission on 03/25/2022, Discharged on 03/25/2022   Component Date Value Ref Range Status    Color, UA 03/25/2022 Yellow  Yellow Final    Turbidity UA 03/25/2022 Clear  Clear Final    Glucose, Ur 03/25/2022 NEGATIVE  NEGATIVE Final    Bilirubin Urine 03/25/2022 NEGATIVE  NEGATIVE Final    Ketones, Urine 03/25/2022 NEGATIVE  NEGATIVE Final    Specific [place], UA 03/25/2022 1.014  1.000 - 1.030 Final    Urine Hgb 03/25/2022 NEGATIVE  NEGATIVE Final    pH, UA 03/25/2022 5.5  5.0 - 8.0 Final    Protein, UA 03/25/2022 NEGATIVE  NEGATIVE Final    Urobilinogen, Urine 03/25/2022 Normal  Normal Final    Nitrite, Urine 03/25/2022 NEGATIVE  NEGATIVE Final    Leukocyte Esterase, Urine 03/25/2022 TRACE* NEGATIVE Final    WBC, UA 03/25/2022 6 TO 9  /HPF Final    RBC, UA 03/25/2022 0 TO 2  /HPF Final    Casts UA 03/25/2022 0 TO 2  /LPF Final    Epithelial Cells UA 03/25/2022 6 TO 9  /HPF Final    Bacteria, UA 03/25/2022 FEW* None Final   ]        Cervical Exam was:   fingertip cm dilated, 60 effaced, -2 station    The literature regarding a questionable link to pitocin augmentation and induction of labor, the assistance of labor contractions and the initiation of contractions to help delivery, have been reviewed with the patient regarding the increased potential of having a  with Attention Deficit Hyperactivity Disorder and or Autism. These two disorders and the ramifications of their impact on a child and the family caring for that child has been reviewed with the patient in detail. She was given the risks, benefits and alternatives of the use of this medication. She has agreed to its use in the delivery of her unborn child if needed at the time of delivery, Yes. The patient was counseled on the mandatory call ahead policy. She has been instructed to call the office at anytime prior to going into the hospital so the on-call physician may direct her to the appropriate facility for care. Exceptions were reviewed including but not limited to: Decreased fetal movement, vaginal Bleeding or hemorrhage, trauma, readily expectant delivery, or any instance where she feels 911 should be utilized. The patient was counseled on Labor & Delivery. yes  Route of delivery and counseling on vaginal, operative vaginal, and  sections were completed with the risks of each to both the patient as well as her baby. The possibility of a blood transfusion was discussed as well. The patient was not opposed to receiving a transfusion if needed. The patient was counseled on types of analgesia during labor and is considering either Regional or IV medication the risks, benefits and alternatives were discussed.  Testing:  Not indictaed        Assessment:  1. Kenton Colindres is a 36 y.o. female  2.  [id number]  3. 39w4d    Patient Active Problem List    Diagnosis Date Noted    Third pregnancy 10/25/2021     Priority: High    Subchorionic hematoma, antepartum 2021     Priority: High     Overview Note:     Pelvic rest/noheavy lifting       [facility]     Priority: High    Vaginal bleeding during pregnancy, antepartum 2022    Pain 2022    Vaginal discharge during pregnancy in third trimester 2022    32 weeks gestation of pregnancy 2022    Candidiasis of vagina 2022    Anemia affecting pregnancy in third trimester 2022    Pelvic pain 2022     19 F Apg 8/9 Wt 7#10 2019    Depression     Rh+/RI/GBS neg 2019    Cannabis abuse 2016        Diagnosis Orders   1. Subchorionic hematoma, antepartum, fetus 1 of multiple gestation     2. Third pregnancy     3. Anemia affecting pregnancy in third trimester     4. 39 weeks gestation of pregnancy             Plan:  The patient will return to the office for her next visit in 1 weeks. See antepartum flow sheet. Reviewed s/sx of labor and preeclampsia  Continue FKCs  RTO in 1        Patient was seen with total face to face time of 15 minutes. More than 50% of this visit was on counseling and education regarding her    Diagnosis Orders   1. Subchorionic hematoma, antepartum, fetus 1 of multiple gestation     2. Third pregnancy     3. Anemia affecting pregnancy in third trimester     4. 39 weeks gestation of pregnancy      and her options. She was also counseled on her preventative health maintenance recommendations and follow-up.

## 2022-04-14 ENCOUNTER — HOSPITAL ENCOUNTER (OUTPATIENT)
Dept: LAB | Age: 32
Setting detail: SPECIMEN
Discharge: HOME | DRG: 560 | End: 2022-04-14
Payer: COMMERCIAL

## 2022-04-14 ENCOUNTER — HOSPITAL ENCOUNTER (INPATIENT)
Age: 32
LOS: 2 days | Discharge: HOME | DRG: 560 | End: 2022-04-16
Attending: OBSTETRICS & GYNECOLOGY | Admitting: OBSTETRICS & GYNECOLOGY
Payer: COMMERCIAL

## 2022-04-14 PROBLEM — O47.9 UTERINE CONTRACTIONS: Status: ACTIVE | Noted: 2022-04-14

## 2022-04-14 LAB
ABO/RH: NORMAL
AMPHETAMINE SCREEN URINE: NEGATIVE
ANTIBODY SCREEN: NEGATIVE
ARM BAND NUMBER: NORMAL
BARBITURATE SCREEN URINE: NEGATIVE
BENZODIAZEPINE SCREEN, URINE: NEGATIVE
CANNABINOID SCREEN URINE: NEGATIVE
COCAINE METABOLITE, URINE: NEGATIVE
EXPIRATION DATE: NORMAL
HCT VFR BLD CALC: 30.8 % (ref 36–46)
HEMOGLOBIN: 10.1 G/DL (ref 12–16)
MCH RBC QN AUTO: 26 PG (ref 26–34)
MCHC RBC AUTO-ENTMCNC: 33 G/DL (ref 31–37)
MCV RBC AUTO: 78.9 FL (ref 80–100)
METHADONE SCREEN, URINE: NEGATIVE
OPIATES, URINE: NEGATIVE
OXYCODONE SCREEN URINE: NEGATIVE
PDW BLD-RTO: 15.2 % (ref 11.5–14.9)
PHENCYCLIDINE, URINE: NEGATIVE
PLATELET # BLD: 397 K/UL (ref 150–450)
PMV BLD AUTO: 6.9 FL (ref 6–12)
RBC # BLD: 3.9 M/UL (ref 4–5.2)
SARS-COV-2, RAPID: NOT DETECTED
SPECIMEN DESCRIPTION: NORMAL
TEST INFORMATION: NORMAL
WBC # BLD: 11.5 K/UL (ref 3.5–11)

## 2022-04-14 PROCEDURE — 2580000003 HC RX 258

## 2022-04-14 PROCEDURE — 86780 TREPONEMA PALLIDUM: CPT

## 2022-04-14 PROCEDURE — 1220000000 HC SEMI PRIVATE OB R&B

## 2022-04-14 PROCEDURE — 80307 DRUG TEST PRSMV CHEM ANLYZR: CPT

## 2022-04-14 PROCEDURE — 86900 BLOOD TYPING SEROLOGIC ABO: CPT

## 2022-04-14 PROCEDURE — 36415 COLL VENOUS BLD VENIPUNCTURE: CPT

## 2022-04-14 PROCEDURE — U0003 INFECTIOUS AGENT DETECTION BY NUCLEIC ACID (DNA OR RNA); SEVERE ACUTE RESPIRATORY SYNDROME CORONAVIRUS 2 (SARS-COV-2) (CORONAVIRUS DISEASE [COVID-19]), AMPLIFIED PROBE TECHNIQUE, MAKING USE OF HIGH THROUGHPUT TECHNOLOGIES AS DESCRIBED BY CMS-2020-01-R: HCPCS

## 2022-04-14 PROCEDURE — 85027 COMPLETE CBC AUTOMATED: CPT

## 2022-04-14 PROCEDURE — 6370000000 HC RX 637 (ALT 250 FOR IP)

## 2022-04-14 PROCEDURE — 86901 BLOOD TYPING SEROLOGIC RH(D): CPT

## 2022-04-14 PROCEDURE — 59200 INSERT CERVICAL DILATOR: CPT

## 2022-04-14 PROCEDURE — 87635 SARS-COV-2 COVID-19 AMP PRB: CPT

## 2022-04-14 PROCEDURE — 86850 RBC ANTIBODY SCREEN: CPT

## 2022-04-14 PROCEDURE — U0005 INFEC AGEN DETEC AMPLI PROBE: HCPCS

## 2022-04-14 RX ORDER — SODIUM CHLORIDE 9 MG/ML
25 INJECTION, SOLUTION INTRAVENOUS PRN
Status: DISCONTINUED | OUTPATIENT
Start: 2022-04-14 | End: 2022-04-15

## 2022-04-14 RX ORDER — SODIUM CHLORIDE, SODIUM LACTATE, POTASSIUM CHLORIDE, AND CALCIUM CHLORIDE .6; .31; .03; .02 G/100ML; G/100ML; G/100ML; G/100ML
1000 INJECTION, SOLUTION INTRAVENOUS PRN
Status: DISCONTINUED | OUTPATIENT
Start: 2022-04-14 | End: 2022-04-15

## 2022-04-14 RX ORDER — ONDANSETRON 2 MG/ML
4 INJECTION INTRAMUSCULAR; INTRAVENOUS EVERY 6 HOURS PRN
Status: DISCONTINUED | OUTPATIENT
Start: 2022-04-14 | End: 2022-04-15

## 2022-04-14 RX ORDER — SODIUM CHLORIDE, SODIUM LACTATE, POTASSIUM CHLORIDE, AND CALCIUM CHLORIDE .6; .31; .03; .02 G/100ML; G/100ML; G/100ML; G/100ML
500 INJECTION, SOLUTION INTRAVENOUS PRN
Status: DISCONTINUED | OUTPATIENT
Start: 2022-04-14 | End: 2022-04-15

## 2022-04-14 RX ORDER — SODIUM CHLORIDE 0.9 % (FLUSH) 0.9 %
5-40 SYRINGE (ML) INJECTION EVERY 12 HOURS SCHEDULED
Status: DISCONTINUED | OUTPATIENT
Start: 2022-04-14 | End: 2022-04-15

## 2022-04-14 RX ORDER — SODIUM CHLORIDE 0.9 % (FLUSH) 0.9 %
5-40 SYRINGE (ML) INJECTION PRN
Status: DISCONTINUED | OUTPATIENT
Start: 2022-04-14 | End: 2022-04-15

## 2022-04-14 RX ORDER — SODIUM CHLORIDE, SODIUM LACTATE, POTASSIUM CHLORIDE, CALCIUM CHLORIDE 600; 310; 30; 20 MG/100ML; MG/100ML; MG/100ML; MG/100ML
INJECTION, SOLUTION INTRAVENOUS CONTINUOUS
Status: DISCONTINUED | OUTPATIENT
Start: 2022-04-14 | End: 2022-04-15

## 2022-04-14 RX ADMIN — Medication 25 MCG: at 23:09

## 2022-04-14 RX ADMIN — SODIUM CHLORIDE, POTASSIUM CHLORIDE, SODIUM LACTATE AND CALCIUM CHLORIDE: 600; 310; 30; 20 INJECTION, SOLUTION INTRAVENOUS at 22:38

## 2022-04-15 LAB
SARS-COV-2: NORMAL
SARS-COV-2: NOT DETECTED
SOURCE: NORMAL
T. PALLIDUM, IGG: NONREACTIVE

## 2022-04-15 PROCEDURE — 1220000000 HC SEMI PRIVATE OB R&B

## 2022-04-15 PROCEDURE — 3E0P7VZ INTRODUCTION OF HORMONE INTO FEMALE REPRODUCTIVE, VIA NATURAL OR ARTIFICIAL OPENING: ICD-10-PCS | Performed by: OBSTETRICS & GYNECOLOGY

## 2022-04-15 PROCEDURE — 76815 OB US LIMITED FETUS(S): CPT

## 2022-04-15 PROCEDURE — 6370000000 HC RX 637 (ALT 250 FOR IP): Performed by: OBSTETRICS & GYNECOLOGY

## 2022-04-15 PROCEDURE — 59409 OBSTETRICAL CARE: CPT | Performed by: OBSTETRICS & GYNECOLOGY

## 2022-04-15 PROCEDURE — 7200000001 HC VAGINAL DELIVERY

## 2022-04-15 PROCEDURE — 10907ZC DRAINAGE OF AMNIOTIC FLUID, THERAPEUTIC FROM PRODUCTS OF CONCEPTION, VIA NATURAL OR ARTIFICIAL OPENING: ICD-10-PCS | Performed by: OBSTETRICS & GYNECOLOGY

## 2022-04-15 PROCEDURE — 6360000002 HC RX W HCPCS

## 2022-04-15 PROCEDURE — 88307 TISSUE EXAM BY PATHOLOGIST: CPT

## 2022-04-15 PROCEDURE — 59200 INSERT CERVICAL DILATOR: CPT

## 2022-04-15 PROCEDURE — 6360000002 HC RX W HCPCS: Performed by: OBSTETRICS & GYNECOLOGY

## 2022-04-15 PROCEDURE — 6370000000 HC RX 637 (ALT 250 FOR IP)

## 2022-04-15 RX ORDER — PROMETHAZINE HYDROCHLORIDE 25 MG/ML
25 INJECTION, SOLUTION INTRAMUSCULAR; INTRAVENOUS ONCE
Status: COMPLETED | OUTPATIENT
Start: 2022-04-15 | End: 2022-04-15

## 2022-04-15 RX ORDER — MORPHINE SULFATE 10 MG/ML
10 INJECTION, SOLUTION INTRAMUSCULAR; INTRAVENOUS ONCE
Status: COMPLETED | OUTPATIENT
Start: 2022-04-15 | End: 2022-04-15

## 2022-04-15 RX ORDER — ACETAMINOPHEN 500 MG
1000 TABLET ORAL EVERY 6 HOURS PRN
Status: DISCONTINUED | OUTPATIENT
Start: 2022-04-15 | End: 2022-04-16 | Stop reason: HOSPADM

## 2022-04-15 RX ORDER — LANOLIN 100 %
OINTMENT (GRAM) TOPICAL PRN
Status: DISCONTINUED | OUTPATIENT
Start: 2022-04-15 | End: 2022-04-16 | Stop reason: HOSPADM

## 2022-04-15 RX ORDER — ONDANSETRON 4 MG/1
4 TABLET, ORALLY DISINTEGRATING ORAL EVERY 4 HOURS PRN
Status: DISCONTINUED | OUTPATIENT
Start: 2022-04-15 | End: 2022-04-16 | Stop reason: HOSPADM

## 2022-04-15 RX ORDER — DOCUSATE SODIUM 100 MG/1
100 CAPSULE, LIQUID FILLED ORAL 2 TIMES DAILY
Status: DISCONTINUED | OUTPATIENT
Start: 2022-04-15 | End: 2022-04-16 | Stop reason: HOSPADM

## 2022-04-15 RX ORDER — NALBUPHINE HYDROCHLORIDE 10 MG/ML
10 INJECTION, SOLUTION INTRAMUSCULAR; INTRAVENOUS; SUBCUTANEOUS ONCE
Status: COMPLETED | OUTPATIENT
Start: 2022-04-15 | End: 2022-04-15

## 2022-04-15 RX ORDER — IBUPROFEN 800 MG/1
800 TABLET ORAL EVERY 8 HOURS
Status: DISCONTINUED | OUTPATIENT
Start: 2022-04-15 | End: 2022-04-16 | Stop reason: HOSPADM

## 2022-04-15 RX ORDER — SIMETHICONE 80 MG
80 TABLET,CHEWABLE ORAL EVERY 6 HOURS PRN
Status: DISCONTINUED | OUTPATIENT
Start: 2022-04-15 | End: 2022-04-16 | Stop reason: HOSPADM

## 2022-04-15 RX ADMIN — IBUPROFEN 800 MG: 800 TABLET, FILM COATED ORAL at 12:48

## 2022-04-15 RX ADMIN — NALBUPHINE HYDROCHLORIDE 10 MG: 10 INJECTION, SOLUTION INTRAMUSCULAR; INTRAVENOUS; SUBCUTANEOUS at 10:15

## 2022-04-15 RX ADMIN — IBUPROFEN 800 MG: 800 TABLET, FILM COATED ORAL at 21:06

## 2022-04-15 RX ADMIN — Medication 50 MCG: at 05:57

## 2022-04-15 RX ADMIN — DOCUSATE SODIUM 100 MG: 100 CAPSULE, LIQUID FILLED ORAL at 12:48

## 2022-04-15 RX ADMIN — Medication 87.3 MILLI-UNITS/MIN: at 11:45

## 2022-04-15 RX ADMIN — MORPHINE SULFATE 10 MG: 10 INJECTION INTRAVENOUS at 04:15

## 2022-04-15 RX ADMIN — PROMETHAZINE HYDROCHLORIDE 25 MG: 25 INJECTION INTRAMUSCULAR; INTRAVENOUS at 04:16

## 2022-04-15 RX ADMIN — DOCUSATE SODIUM 100 MG: 100 CAPSULE, LIQUID FILLED ORAL at 21:06

## 2022-04-15 ASSESSMENT — PAIN SCALES - GENERAL
PAINLEVEL_OUTOF10: 2
PAINLEVEL_OUTOF10: 4
PAINLEVEL_OUTOF10: 10
PAINLEVEL_OUTOF10: 5
PAINLEVEL_OUTOF10: 10
PAINLEVEL_OUTOF10: 7
PAINLEVEL_OUTOF10: 10

## 2022-04-15 NOTE — H&P
OBSTETRICAL HISTORY AND PHYSICAL    Provider:  HALEY Jacobson CNM      Date: 2022  Time: 9:54 PM    Patient Name: Benito Fabian  Patient : 1990  Room/Bed: 8448/9828-81  Admission Date/Time: 2022  9:12 PM  MRN #: [id number]  [facility] #: [id number]    Primary Care Physician: No primary care provider on file. Admitting Provider: Dr. Julio Contreras is a 36 y.o. female [id number]    CC: uterine contractions    HPI: Benito Fabian is a 36 y.o.  at 39w5d who presents with contractions that have become closer together and stronger over the past two hours. She reports she started having contractions around 2 hours ago and they were about 2 minutes apart. She reports she was unable to get off the couch for some of these contractions due to increasing intensity. The patient reports fetal movement is present, complains of contractions, denies loss of fluid, denies vaginal bleeding. DATING:  LMP: No LMP recorded. Patient is pregnant. Estimated Date of Delivery: 22   Based on: ultrasound at 9w2d    PREGNANCY RISK FACTORS:  Patient Active Problem List   Diagnosis    Mood disorder ([facility] [address]    Cannabis abuse    Rh+/RI/GBS neg    Depression     21 F Apg 8/9 Wt 7#10    Subchorionic hematoma, antepartum    Third pregnancy    Pelvic pain    Anemia affecting pregnancy in third trimester    Vaginal discharge during pregnancy in third trimester    32 weeks gestation of pregnancy    Candidiasis of vagina    Pain    Vaginal bleeding during pregnancy, antepartum    Uterine contractions   Cannabis abuse - unable to determine      Allergies: Allergies as of 2022    (No Known Allergies)       Medications:  No current facility-administered medications on file prior to encounter.      Current Outpatient Medications on File Prior to Encounter   Medication Sig Dispense Refill    ondansetron (ZOFRAN) 4 MG tablet Take 1 tablet by mouth every 8 hours as needed for Nausea 30 tablet 1    XfEnd-N82-RS-C-DSS-SuccAc-Zn (FERIVA ) 75-1 MG TABS Take 1 tablet by mouth daily 90 tablet 1    ferrous sulfate (IRON 325) 325 (65 Fe) MG tablet Take 1 tablet by mouth 2 times daily 180 tablet 6    Prenatal Vit-Fe Fumarate-FA (PRENATAL VITAMIN) 27-0.8 MG TABS Take 1 tablet by mouth daily 30 tablet 12          Steroids Given In This Pregnancy:  no     Past Medical History:   Diagnosis Date    Anemia     Depression     TSH deficiency 2019       No past surgical history on file.     OB History    Para Term  AB Living   3 2 2 0 0 2   SAB IAB Ectopic Molar Multiple Live Births   0 0 0 0 0 2      # Outcome Date GA Lbr Kevin/2nd Weight Sex Delivery Anes PTL Lv   3 Current            2 Term 21 38w6d 02:07 / 00:05 7 lb 10.8 oz (3.48 kg) F Vag-Spont None N JOAN      Complications: Meconium at birth      Name: Don Koo: Calixto  Apgar5: 9   1 Term 2014 38w1d  6 lb 14 oz (3.118 kg) F Vag-Spont   JOAN       Family History   Problem Relation Age of Onset    Hypertension Other         G.Parents    Depression Mother     Hypertension Mother     Alcohol Abuse Mother     Diabetes Mother     Diabetes Other         G.Parents    Diabetes Brother          Social History     Socioeconomic History    Marital status: Single     Spouse name: Not on file    Number of children: Not on file    Years of education: Not on file    Highest education level: Not on file   Occupational History    Not on file   Tobacco Use    Smoking status: Never Smoker    Smokeless tobacco: Never Used   Vaping Use    Vaping Use: Never used   Substance and Sexual Activity    Alcohol use: Not Currently     Alcohol/week: 0.0 standard drinks     Comment: Social    Drug use: Not Currently     Types: Marijuana Bing Weeks     Comment: occ    Sexual activity: Yes     Partners: Male   Other Topics Concern    Not on file   Social History Narrative    Not on file     Social Determinants of Health     Financial Resource Strain:     Difficulty of Paying Living Expenses: Not on file   Food Insecurity:     Worried About [address] in the Last Year: Not on file    Ran Out of Food in the Last Year: Not on file   Transportation Needs:     Lack of Transportation (Medical): Not on file    Lack of Transportation (Non-Medical): Not on file   Physical Activity:     Days of Exercise per Week: Not on file    Minutes of Exercise per Session: Not on file   Stress:     Feeling of Stress : Not on file   Social Connections:     Frequency of Communication with Friends and Family: Not on file    Frequency of Social Gatherings with Friends and Family: Not on file    Attends [employer] Services: Not on file    Active Member of Clubs or Organizations: Not on file    Attends Club or Organization Meetings: Not on file    Marital Status: Not on file   Intimate Partner Violence:     Fear of Current or Ex-Partner: Not on file    Emotionally Abused: Not on file    Physically Abused: Not on file    Sexually Abused: Not on file   Housing Stability:     Unable to Pay for Housing in the Last Year: Not on file    Number of [place] in the Last Year: Not on file    Unstable Housing in the Last Year: Not on file       Review Of Systems:  A minimum of an eleven point review of systems was completed.     REVIEW OF SYSTEMS:   Constitutional: negative fever, negative chills, negative weight changes   HEENT: negative visual disturbances, negative headaches, negative dizziness, negative hearing loss  Breast: Negative breast abnormalities, negative breast lumps, negative nipple discharge  Respiratory: negative dyspnea, negative cough, negative SOB  Cardiovascular: negative chest pain,  negative palpitations, negative arrhythmia, negative syncope   Gastrointestinal: negative abdominal pain, negative RUQ pain, negative N/V, negative diarrhea, negative constipation, negative bowel changes, negative heartburn Genitourinary: negative dysuria, negative hematuria, negative urinary incontinence, negative vaginal discharge, negative vaginal bleeding or spotting + contractions  Dermatological: negative rash, negative pruritis, negative mole or other skin changes  Hematologic: negative bruising  Immunologic/Lymphatic: negative recent illness, negative recent sick contact  Musculoskeletal: negative back pain, negative myalgias, negative arthralgias  Neurological:  negative dizziness, negative migraines, negative seizures, negative weakness  Behavior/Psych: negative depression, negative anxiety, negative SI, negative HI    Physical Exam:  Vitals:    04/14/22 2123   BP: 131/72   Pulse: 89   SpO2: 100%       General appearance:  Alert, no apparent distress, and cooperative  Skin:  Warm, dry, no rashes or erythema  Neurologic:  alert, oriented, normal speech and gait, normal reflexes  Lungs:  No increased work of breathing, good air exchange, clear to auscultation bilaterally, no crackles or wheezing  Heart:  regular rate and rhythm and no murmur   Breast:  Deferred   Abdomen:  soft, non-tender, no right upper quadrant tenderness, no CVA tenderness. Gravid and consistent with her gestational age, EFW by [facility] Maneuver was 8 pounds.   Uterus non-tender, no signs of abruption and no signs of chorioamnionitis  Extremities:  no calf tenderness, non edematous, DTRs: normal    PELVIC EXAM:               Proven to 7 pounds 10 oz   Cervix Check: fingertip dilated, 60 % effaced, -2 station, posterior position, medium consistency, Cephalic   Bishops Score: 4   0 1 2 3   Position Posterior Intermediate Anterior -   Consistency Firm Intermediate Soft -   Effacement 0-30% 31-50% 51-80% >80%   Dilation 0cm 1-2cm 3-4cm >5cm   Fetal Station -3 -2 -1, 0 +1, +2     LIMITED BEDSIDE US:  Position: Cephalic  Placental Location: anterior  Fetal Heart Tones: Present  Fetal Movement: Present  Amniotic Fluid Index/Volume:  adequate 2x2 cm fluid pocket  Estimated Fetal Weight:  7 lbs 6oz      MEMBRANES:  Intact                      FETAL HEART RATE:       Baseline: 125     Variability: moderate     Accelerations: present     Decelerations: absent            CONTRACTIONS: Frequency: 3-7 minutes                           Duration: 50-90 seconds                           Intensity: moderate      PRENATAL LAB RESULTS:   Blood Type/Rh: O pos  Antibody Screen: negative  Hemoglobin, Hematocrit, Platelets: pending  Rubella: immune  T. Pallidum, IgG: non-reactive  Hepatitis B Surface Antigen: non-reactive   Hepatitis C Antibody: not done   HIV: non-reactive   Sickle Cell Screen: not done  Gonorrhea: negative  Chlamydia: negative  Urine culture: negative, date: 22    Early 1 hour Glucose Tolerance Test:  80  1 hour Glucose Tolerance Test:  108    Group B Strep: negative RV culture on 22  Cystic Fibrosis Screen: negative  First Trimester Screen: no aneuploidy  MSAFP/Multiple Markers: not done  Non-Invasive Prenatal Testing: not done  Anatomy US: anterior placenta, 3 VC, surprise gender, normal anatomy    ASSESSMENT/PLAN:  Alec Shrestha is a 36 y.o. female   At 38w11d  Admit: early labor  - cEFM/[place]  - IV LR at 125ml/hr  - COVID testing ordered  - Written consent for UDS obtained, specimen collected  - Discussed options including recheck SVE at midnight and decide plan of care at that time versus plan for admission and augmentation if needed. Patient desires to stay and have augmentation at this time. Plan for expectant management at this time. Patient and plan of care discussed with Dr. Shelli You who is agreeable with plan. - Diet: regular     FHR: Category 1    Anemia  - Admission Hgb pending  - Patient asymptomatic on admission     Hx of depression  - Stable on no medication  - Social work consult postpartum         Plan discussed with Dr. Shelli You, who is agreeable. Dr Shelli You to resume care at 2300.      Steroids given this admission: No    Risks, benefits, alternatives and possible complications have been discussed in detail with the patient. Admission, and post admission procedures and expectations were discussed in detail. All questions were answered.       HALEY De Anda CNM  Midwife ALBERTINA  4/14/2022, 9:54 PM

## 2022-04-15 NOTE — PROGRESS NOTES
LABOR PROGRESS NOTE      Patient Name: Meera Stevenson  Patient : 1990  Room/Bed: 6824/2491-90  Admission Date/Time: 2022  9:12 PM  MRN #: [id number]  [facility] #: [id number]    Date: 2022  Time: 11:02 PM    The patient was seen and examined. Her pain is well controlled. She reports fetal movement is present, complains of contractions, denies loss of fluid, denies vaginal bleeding. Denies s/s of preeclampsia including headache vision changes, difficulty breathing, chest pain, RUQ pain or worsening swelling of extremities. Vital Signs:  VS:  oral temperature is 97.4 °F (36.3 °C). Her blood pressure is 131/72 and her pulse is 89. Her respiration is 16 and oxygen saturation is 100%. FHT:    Baseline: 125   Variability: moderate              Accels: present   Decels: Absent    Contraction pattern:                                  Frequency: 3-6 minutes                                 Duration: 60-90 seconds                                 Intensity: moderate    Chaperone: ION Barros  Cervix:             Dilation: 1cm            Effacement: 60%            Station: -2            Position: posterior            Consistency: soft    Status of membranes: intact    Pain control: adequate    Maternal status (hydration, fatigue, voids): Voiding without difficulty, IV infusing, and oral hydration encouraged. Interventions: SVE and cytotec    Assessment/Plan:  Meera Stevenson is a 36 y.o. female  at 39w5d admitted for early labor   - cEFM/ TOCO  - GBS negative   - VSS, Afebrile    - IVF LR @ 125 ml/hr    - Discussed contractions have spaced out some. Discussed trying vaginal cytotec and patient is agreeable with plan. Risks ans benefits discussed. 25mcg vaginal cytotec administered. - Augmentation method: cytotec   - Diet: regular   - Plan of care discussed with Dr. Kesha Dorantes who is agreeable. Dr. Kesha Dorantes to resume care at 2300.      Category 1 strip      Attending: HALEY Parr - ROSENDO  Midwife  4/14/2022, 11:02 PM

## 2022-04-15 NOTE — FLOWSHEET NOTE
Dr. Lonza Leventhal updated on patient status. Order received for therapeutic rest. Morphine 10mg IM x1, phenergan 25mg IMx1. Read back and verified.

## 2022-04-15 NOTE — FLOWSHEET NOTE
Patient admitted to room 181 from ER per wheelchair. Here with c/o ctx. Denies ROM or vaginal bleeding. Denies N/V/D. Denies fever/chills. Relates of + fetal movement. Request for urine sample made. Urine drug screen explained to patient. Instructed on clean catch urine. Consent obtained. Patient verbalizes understanding and acceptance. Assisted into bed, Siderails up x2. Call light in reach. Bed in low position. Oriented to room, surroundings, call system and plan of care. Patient verbalizes understanding. EFM applied and monitor test completed/passed. FHTs found in LLQ- verified against maternal pulse.

## 2022-04-15 NOTE — L&D DELIVERY NOTE
Mother's Information    Labor Events     Labor?: No  Cervical Ripening:   Now         Viji Pickard Pending Bella Mcgregor [[id number]]    Labor Events     Labor?: No   Steroids?: None  Cervical Ripening Date/Time: 22 23:10:00   Cervical Ripening Type: Misoprostol  Antibiotics Received during Labor?: No  Rupture Identifier: Sac 1   Rupture Date/Time: 4/15/22 10:21:00   Rupture Type: AROM  Fluid Color: Clear  Fluid Odor: None  Fluid Volume: Scant  Induction: AROM, Misoprostol  Labor Complications: None     Anesthesia       Start Pushing    Labor onset date/time: 4/15/22 10:53:00 Now   Dilation complete date/time: 4/15/22 11:18:00 Now   Start pushing date/time: 4/15/2022 11:19:00   Decision date/time (emergent ):       Delivery ([place])    Delivery Date/Time:  4/15/22 11:34:00   Delivery Type: Vaginal, Spontaneous    Details:         Presentation    Presentation: Vertex     Shoulder Dystocia    Shoulder Dystocia Present?: No  Add Second Maneuver  Add Third Maneuver  Add Fourth Maneuver  Add Fifth Maneuver  Add Sixth Maneuver  Add Seventh Maneuver  Add Eighth Maneuver  Add Ninth Maneuver     Assisted Delivery Details    Forceps Attempted?: No  Vacuum Extractor Attempted?: No     Document Additional Attempt       Document Additional Attempt             Cord    Vessels: 3 Vessels  Complications: None  Delayed Cord Clamping?: Yes  Cord Clamped Date/Time: 4/15/2022 11:35:00  Cord Blood Disposition: Lab     Placenta    Date/Time: 4/15/2022 11:39:00  Removal: Spontaneous  Appearance: Intact  Disposition: Pathology     Lacerations    Episiotomy: None  Perineal Lacerations: None  Other Lacerations: no non-perineal laceration     Vaginal Counts      Sponges Needles Instruments   Initial Counts      Final Counts      If the count is incorrect due to Intentionally Retained Foreign Object (IRFO) add the IRFO LDA in Lines/Drains.   Add LDA: Link to LDA     Blood Loss  Mother: Sadia Tono Lupe Gandhi #[id number]   Start of Mother's Information    Delivery Blood Loss  04/15/22 1053 - 04/15/22 1156    None           End of Mother's Information  Mother: Lori Silva #[id number]          Delivery Providers    Delivering clinician: Arik Mueller DO     Provider Role    Adina Dimas LPN OB Tech    Aida Meade, RN Delivery Nurse    Chelsi Gil, RN Primary  Nurse    Osman Boewrs, APRN - ANGELITOM Midwife    Brisa Jimenez RN Delivery Nurse           Assessment    Living Status: Living  Delivery Location: L&D     Apgar Scoring Key:    0 1 2    Skin Color: Blue or pale Acrocyanotic Completely pink    Heart Rate: Absent <100 bpm >100 bpm    Reflex Irritability: No response Grimace Cry or active withdrawal    Muscle Tone: Limp Some flexion Active motion    Respiratory Effort: Absent Weak cry; hypoventilation Good, crying                  Skin Color:   Heart Rate:   Reflex Irritability:   Muscle Tone:   Respiratory Effort: Total:            1 Minute:        Apgar 1 total from OB History    5 Minute:        Apgar 5 total from OB History    10 Minute:              15 Minute:              20 Minute:                             Resuscitation    Method: Bulb Suction, Stimulation           [place] Measurements    Birth Weight: 3830 g           Title    Skin to Skin Initiation Date/Time: 4/15/22    Skin to Skin With: Mother   Skin to Skin End Date/Time:              Predelivery note:  Patient called writer to bedside for increased pressure and feeling the need to push. SVE: 7cm, 90%, 0 station. Writer remained at bedside to offer labor support. Dr. Sara Heredia in house and aware.        [facility] Vaginal Delivery Summary          Information for the patient's :  Rickie Garrett [[id number]]          Pre-operative Diagnosis:  Spontaneous labor with augmentation    Post-operative Diagnosis:  Same, delivered    Procedure:  Spontaneous vaginal delivery    Provider:   Dr. Sara Heredia and [place], [facility]     Information for the patient's :  Nini Santillan Pending Catana Batters [[id number]]          Anesthesia:  none    Estimated blood loss:  See QBL    Specimen:  Placenta sent to pathology     Cord blood sent Yes    Complications:  none    Condition:  infant stable to general nursery and mother stable    Details of Procedure: The patient is a 36 y.o. female at 37w11d   OB History        3    Para   2    Term   2       0    AB   0    Living   2       SAB        IAB        Ectopic        Molar        Multiple   0    Live Births   2             who was admitted for early latent labor. She received the following interventions:   1. Cytotec   2. AROM  3. Postpartum pitocin    The patient progressed, did not receive an epidural, became Complete and started to push with strong effort. She progressed to spontaneous vaginal delivery of male infant, who was delivered atraumatically, shoulders easily delivered and placed on mother's abdomen. The cord clamping was not delayed due to need for further  evaluation. The cord was clamped and cut and cord blood obtained. The delivery of the placenta was spontaneous,  [address] [place] presentation. Placental examination revealed a grossly intact placenta with a  3 vessel cord. The uterus was massaged to firm and contracted immediately, with bleeding under control. IV Pitocin infusion  started. The perineum and vagina were inspected and no lacerations were found. Mother and baby stable. Baby to warmer for further evaluation then skin to skin. Dr. Stephania Mckeon present for entire delivery.

## 2022-04-15 NOTE — PROGRESS NOTES
LABOR PROGRESS NOTE      Patient Name: Kvng Healy  Patient : 1990  Room/Bed: 2770/5793-01  Admission Date/Time: 2022  9:12 PM  MRN #: [id number]  [facility] #: [id number]    Date: 4/15/2022  Time: 8:28 AM    The patient was seen and examined. Her pain is not well controlled. She reports her pain is a 10/10 with contractions. She reports she is feeling increased pressure with contractions. She reports fetal movement is present, complains of contractions, denies loss of fluid, denies vaginal bleeding. Denies s/s of preeclampsia including headache vision changes, difficulty breathing, chest pain, RUQ pain or worsening swelling of extremities. Vital Signs:  VS:  oral temperature is 96.8 °F (36 °C). Her blood pressure is 94/63 and her pulse is 80. Her respiration is 16 and oxygen saturation is 98%. FHT:    Baseline: 125   Variability: moderate              Accels: present   Decels: Early    Contraction pattern:                                  Frequency: 2-3 minutes                                 Duration: 50-70 second                                 Intensity: moderate    Chaperone: ION Dye  Cervix:             Dilation: 2cm            Effacement: 70%            Station: -1            Position: posterior            Consistency: medium    Status of membranes: intact    Pain control: inadequate     Maternal status (hydration, fatigue, voids): Iv infusing, voiding without difficulties, and PO fluids encouraged. Interventions: SVE    Assessment/Plan:  Kvng Healy is a 36 y.o. female  at 39w6d admitted for early labor   - cEFM/ TOCO  - GBS negative   - VSS, Afebrile    - IVF LR @ 125 ml/hr    - Augmentation method: cytotec   - Discussed options for pain control including IV pain medications, epidural, and nitrous oxide. Patient does not desire intervention for pain control at this time. - Plan to recheck SVE at 10:30 - 4 hours post cytotec administration - to decide plan of care. - Diet: regular   - Dr. Enrico Soliman updated and is agreeable with plan of care.      Category 1 strip      Attending: HALEY Luo - ROSENDO  Midwife  4/15/2022, 8:28 AM

## 2022-04-15 NOTE — FLOWSHEET NOTE
Dr. Jasen Frazier phoned in for update on patient. Order received for cytotec 50mcg buccal x1. Read back and verified.

## 2022-04-15 NOTE — PROGRESS NOTES
regular   - Nubain ordered for pain control     Category 1 strip      Attending: Dr. Tang Mayfield, HALEY - ROSENDO  Midwife  4/15/2022, 10:08 AM

## 2022-04-15 NOTE — PROGRESS NOTES
RN entered room to patient on the phone with her mother. Patient/patient's mother disclosed that the patient has a brain tumor called \"Prolactinoma\". RN notified Michelle Fields CNM.

## 2022-04-16 VITALS
OXYGEN SATURATION: 99 % | SYSTOLIC BLOOD PRESSURE: 110 MMHG | DIASTOLIC BLOOD PRESSURE: 62 MMHG | HEIGHT: 60 IN | WEIGHT: 193 LBS | BODY MASS INDEX: 37.89 KG/M2 | HEART RATE: 68 BPM | RESPIRATION RATE: 16 BRPM | TEMPERATURE: 97.3 F

## 2022-04-16 PROCEDURE — 6370000000 HC RX 637 (ALT 250 FOR IP)

## 2022-04-16 RX ORDER — IBUPROFEN 800 MG/1
800 TABLET ORAL EVERY 8 HOURS
Qty: 120 TABLET | Refills: 3 | Status: SHIPPED | OUTPATIENT
Start: 2022-04-16 | End: 2022-07-27

## 2022-04-16 RX ORDER — DOCUSATE SODIUM 100 MG/1
100 CAPSULE, LIQUID FILLED ORAL 2 TIMES DAILY
Qty: 60 CAPSULE | Refills: 1 | Status: SHIPPED | OUTPATIENT
Start: 2022-04-16 | End: 2022-04-28

## 2022-04-16 RX ORDER — ONDANSETRON 4 MG/1
4 TABLET, ORALLY DISINTEGRATING ORAL EVERY 8 HOURS PRN
Qty: 15 TABLET | Refills: 1 | Status: SHIPPED | OUTPATIENT
Start: 2022-04-16 | End: 2022-04-28

## 2022-04-16 RX ADMIN — IBUPROFEN 800 MG: 800 TABLET, FILM COATED ORAL at 15:46

## 2022-04-16 RX ADMIN — IBUPROFEN 800 MG: 800 TABLET, FILM COATED ORAL at 05:05

## 2022-04-16 RX ADMIN — DOCUSATE SODIUM 100 MG: 100 CAPSULE, LIQUID FILLED ORAL at 08:31

## 2022-04-16 ASSESSMENT — PAIN SCALES - GENERAL
PAINLEVEL_OUTOF10: 5
PAINLEVEL_OUTOF10: 5
PAINLEVEL_OUTOF10: 2

## 2022-04-16 NOTE — PROGRESS NOTES
POST PARTUM DAY # 1    Patient Name: Teresita Rose  Patient : 1990  Room/Bed: 2828/3150-16  Admission Date/Time: 2022  9:12 PM  MRN #: [id number]  [facility] #: [id number]    Date: 2022  Time: 9:51 AM    Teresita Rose is a 36 y.o. female  This patient was seen today. She Delivered on 4/15/22. Her pregnancy was complicated by:     Patient Active Problem List   Diagnosis    Mood disorder ([facility] [address]    Cannabis abuse    Rh+/RI/GBS neg    Depression     21 F Apg  Wt 7#10    Subchorionic hematoma, antepartum    Third pregnancy    Pelvic pain    Anemia affecting pregnancy in third trimester    Vaginal discharge during pregnancy in third trimester    32 weeks gestation of pregnancy    Candidiasis of vagina    Pain    Vaginal bleeding during pregnancy, antepartum    Uterine contractions    Fetal heart rate deceleration, delivered, current hospitalization           Today she is doing well without any chief complaint. Her lochia is light. She denies chest pain, shortness of breath and headache. She is not breast feeding and she denies any signs or symptoms of mastitis. These were reviewed with her in detail. She is ambulating well. Her bowels are regular. Her voiding pattern is Normal. I reviewed signs and symptoms of post partum depression with the patient, she currently denies any of these symptoms. I have counseled this patient on secondary smoke risks and the increased incidence of sudden infant death syndrome.      Vitals:    04/15/22 1741 04/15/22 2108 22 0232 22 0737   BP: 112/60 (!) 111/55 (!) 124/52 112/63   Pulse: 74 77 80 71   Resp:    Temp: 97.9 °F (36.6 °C) 97 °F (36.1 °C)  97.3 °F (36.3 °C)   TempSrc: Oral Temporal  Infrared   SpO2: 98%      Weight:       Height:           General appearance:   awake, alert, cooperative, no apparent distress, and appears stated age    Lungs:  CTA BL    Heart:  normal rate and regular rhythm    Abdomen:  Abdomen soft, yes non-tender. Uterus:  normal size, well involuted, firm, non-tender    Extremities:  no cyanosis, clubbing or edema present      Perineum:  not inspected    Assessment:  1. Lolita Plate is PPD # 1     2. Patient Active Problem List    Diagnosis Date Noted    Third pregnancy 10/25/2021     Priority: High    Subchorionic hematoma, antepartum 2021     Priority: High     Overview Note:     Pelvic rest/noheavy lifting       Mood disorder ([facility] [address] 2016     Priority: High    Fetal heart rate deceleration, delivered, current hospitalization     Uterine contractions 2022    Vaginal bleeding during pregnancy, antepartum 2022    Pain 2022    Vaginal discharge during pregnancy in third trimester 2022    32 weeks gestation of pregnancy 2022    Candidiasis of vagina 2022    Anemia affecting pregnancy in third trimester 2022    Pelvic pain 2022     21 F Apg 8/9 Wt 7#10 2019    Depression     Rh+/RI/GBS neg 2019    Cannabis abuse 2016     3. Condition: good    Plan:  1. Continue with current post partum care  2. Doing well, counseled, and stable for discharge if she would like. Home care, Follow up Care, and birth control review completed    RTO 2 weeks    Secondary Smoke risks and Sudden Infant Death Syndrome were reviewed with recommendations. Signs and Symptoms of Post Partum Depression were reviewed. The patient is to call if any occur. Signs and symptoms of Mastitis were reviewed. The patient is to call if any occur for follow up. Discharge Instructions for Labor and Delivery, Vaginal Birth     A vaginal birth refers to the baby being delivered through the vagina. The amount of time that labor can take varies greatly. Labor for the average first-born baby is about 12 hours. Usually your hospital stay after a routine delivery is no more than two nights. Some new mothers go home the same day. Recovery from childbirth varies depending upon whether you had an episiotomy (an incision in the perineum, the area between your vaginal opening and your anus), the duration of labor and delivery, and the amount of rest you get. In general, it takes about 6-8 weeks for a woman's body to recover from childbirth. What You Will Need   Along with your medications, you will need the following:   · Sanitary pads    · Nursing pads    · Witch hazel pads    · Sitz bath    Steps to [address] will want to arrange for transportation home for you and your baby. The baby will need a car seat. You will receive instructions in the hospital for breastfeeding and taking care of the perineum area. You may use ointment for cracked nipples or warm water rinses to your perineum. · You will need to wear sanitary pads for about six weeks after delivery. · If you had an episiotomy or vaginal tear, you will be sent home with a plastic squirt bottle. Fill it with warm water and squirt over the vaginal and anal area every time you urinate and defecate. · Warm baths can be soothing to healing tissues. · Apply warm or cold cloths to sore breasts. · Apply ointment to cracked nipples. · Use nursing pads for leaky breast.    · Apply witch hazel pads to sore perineum (area between vagina and anus). · Ask your doctor about when it is safe for you to shower or bathe. · Sit in a sitz bath to soothe sore perineum and/or hemorrhoids. A sitz bath is soaking the hip and buttocks area in warm water. You can buy a plastic sitz bath at most [employer]. It will fit on your toilet. You can also use your bathtub. Diet    Eat a well balanced, healthy diet to help your recover from childbirth. If you are breastfeeding, you will need additional calories each day. You may also be advised to avoid certain foods. Some women experience constipation after childbirth. To avoid this problem:   · Drink plenty of fluids.    · Eat foods high in fiber, such as:   ¨ Whole grain cereals and breads   ¨ Fruits and vegetables   ¨ Legumes (eg, beans, lentils)   Physical Activity    Labor and delivery is tiring. Rest when you can to regain your energy. Your doctor will encourage you to exercise by walking. Ask your doctor when you will be able to return to more strenuous exercise. Your doctor may suggest you do Kegel exercises to strengthen your pelvic muscles. To do these tighten your muscles as if you were stopping your urine flow. Hold for a few seconds and then relax. Do these throughout the day. Medications    Breastfeeding can cause your uterus to contract. If painful, your doctor may recommend a pain reliever. If you are breastfeeding, it's important to ask your doctor about taking medications. You may receive from the hospital a list of medications to avoid. Once your doctor has approved your medications, it's important to:   · Take your medication as directed. Do not change the amount or the schedule. · Do not stop taking them without talking to your doctor. · Do not share them. · Know what the results and side effects may be. Report bothersome side effects to your doctor. · Some drugs can be dangerous when mixed. Talk to a doctor or pharmacist if you are taking more than one drug. This includes over-the-counter medication and herb or dietary supplements. · Plan ahead for refills so you don't run out. Lifestyle Changes    Having a baby requires significant lifestyle changes. You and your doctor will plan lifestyle changes that will help you recover. Some things to keep in mind include:   · It is important to get enough rest so you can recover. Try sleeping when the baby sleeps. · Ask your doctor when you can resume sexual relations. If you have not done so already, talk to your doctor about birth control options. · If you are breastfeeding, consider a breast pump.    · Call your obstetrician and/or pediatrician for any questions that arise. · Understand the changes your body is going through as it recovers from childbirth:   ¨ Hot and cold flashes as your body adjusts to new hormone and blood flow levels   ¨ Urinary or fecal incontinence due to stretched muscles   ¨ After pains from uterine contractions as the uterus shrinks   ¨ Vaginal bleeding (called lochia), which is heavier than your period (generally stops within two months)   ¨ Baby blues, feelings of irritability, sadness, crying, or anxiety. Postpartum depression is more severe, occurring in 10%-20% of new moms. If you experience such symptoms, contact your doctor. · Consider joining a support group for new mothers. You can get encouragement and learn parenting strategies. Follow-up   Schedule a follow-up appointment as directed by your doctor. Call Your Doctor If Any of the Following Occurs   It is important for you to monitor your recovery once you leave the hospital. That way, you can alert your doctor to any problems immediately. If any of the following occurs, call your doctor:   · Signs of infection, including fever and chills    · Increased bleeding: soaking more than one sanitary pad an hour    · Wounds that become red, swollen or drain pus    · Vaginal discharge that smells foul    · New pain, swelling, or tenderness in your legs    · Pain that you can't control with the medications you've been given    · Pain, burning, urgency or frequency of urination, or persistent bleeding in the urine    · Cough, shortness of breath, or chest pain    · Depression, suicidal thoughts, or feelings of harming your baby    · Breasts that are hot, red and accompanied by fever    · Any cracking or bleeding from the nipple or areola (the dark-colored area of the breast)      In case of an emergency, call 911 immediately.            Electronically signed by Dimple Simmons DO on 4/16/2022 at 9:51 AM

## 2022-04-16 NOTE — PLAN OF CARE
Problem: Pain:  Goal: Pain level will decrease  Description: Pain level will decrease  Outcome: Completed  Goal: Control of acute pain  Description: Control of acute pain  Outcome: Completed  Goal: Control of chronic pain  Description: Control of chronic pain  Outcome: Completed     Problem: Sensory:  Goal: Pain level will decrease  Description: Pain level will decrease  Outcome: Completed

## 2022-04-16 NOTE — PLAN OF CARE
Problem: Pain:  Goal: Pain level will decrease  Description: Pain level will decrease  Outcome: Completed  Goal: Control of acute pain  Description: Control of acute pain  Outcome: Completed  Goal: Control of chronic pain  Description: Control of chronic pain  Outcome: Completed     Problem: Sensory:  Goal: Pain level will decrease  Description: Pain level will decrease  Outcome: Completed  Goal: Ability to identify pain intensity on a pain scale and rate it consistently will improve  Description: Ability to identify pain intensity on a pain scale and rate it consistently will improve  Outcome: Completed  Goal: Ability to maintain vital signs within normal range will improve  Description: Ability to maintain vital signs within normal range will improve  Outcome: Completed

## 2022-04-16 NOTE — FLOWSHEET NOTE
Discharge teaching and instructions completed. AVS reviewed. [facility] Save your life: Post-Birth Warning Signs handout reviewed and given to mother. Understanding verbalized. Patient voiced understanding regarding prescriptions, follow up appointments, and care of self at home. Discharged home per wheelchair.

## 2022-04-17 NOTE — DISCHARGE SUMMARY
Obstetric Discharge Summary  [facility]    Patient Name: Jose Kidd  Patient : 1990  Primary Care Physician: No primary care provider on file. Admit Date: 2022    Principal Diagnosis: IUP at 39w6d, admitted for painful contractions intractable to conservative measures, RR induction     Her pregnancy has been complicated by:   Patient Active Problem List   Diagnosis    Mood disorder ([address]    Cannabis abuse    Rh+/RI/GBS neg    Depression     19 F Apg 8/9 Wt 7#10    Subchorionic hematoma, antepartum    Third pregnancy    Pelvic pain    Anemia affecting pregnancy in third trimester    Vaginal discharge during pregnancy in third trimester    32 weeks gestation of pregnancy    Candidiasis of vagina    Pain    Vaginal bleeding during pregnancy, antepartum    Uterine contractions    Fetal heart rate deceleration, delivered, current hospitalization       Infection Present?: No  Hospital Acquired: No    Surgical Operations & Procedures:  [] Pitocin Induction of Labor  [] Pitocin Augmentation of Labor  [x] Prostaglandin Induction of Labor  [] Mechanical Induction of Labor  [x] Artificial Rupture of Membranes  [] Intrauterine Pressure Catheter  [] Fetal Scalp Electrode  [] Amnioinfusion  Analgesia: none  Delivery Type: Spontaneous Vaginal Delivery   Laceration(s): Absent    Consultations: none    Pertinent Findings & Procedures:   Jose Kidd is a 36 y.o. female  at 37w11d admitted for pelvic pressure and contractions. Pt. Did not feel comfortable with discharge and had occasional late decelerations. She was kept for risk reducing induction; received cytotec and AROM. She delivered by spontaneous vaginal a Live Born infant on 4/15.        Information for the patient's :  Ranjan Neumann [[id number]]   male   Birth Weight: 8 lb 7.1 oz (3.83 kg)       Apgars:   Information for the patient's :  Ranjan Neumann [[id number]] Postpartum course: normal.      Course of patient: uncomplicated    Discharge to: Home    Readmission planned: no     Recommendations on Discharge:     Medications:      Medication List      START taking these medications    docusate sodium 100 MG capsule  Commonly known as: COLACE  Take 1 capsule by mouth 2 times daily     ibuprofen 800 MG tablet  Commonly known as: ADVIL;MOTRIN  Take 1 tablet by mouth every 8 hours     ondansetron 4 MG disintegrating tablet  Commonly known as: ZOFRAN-ODT  Take 1 tablet by mouth every 8 hours as needed for Nausea        CONTINUE taking these medications    FeRiva 21/7 75-1 MG Tabs  Take 1 tablet by mouth daily     ferrous sulfate 325 (65 Fe) MG tablet  Commonly known as: IRON 325  Take 1 tablet by mouth 2 times daily     Prenatal Vitamin 27-0.8 MG Tabs  Take 1 tablet by mouth daily        STOP taking these medications    ondansetron 4 MG tablet  Commonly known as: Zofran           Where to Get Your Medications      These medications were sent to [id number] [place], [place] - [address] -  [phone] Miguelito Easley [phone]  [address] [facility], [address] [place] [id number]    Phone: [phone]   · docusate sodium 100 MG capsule  · ibuprofen 800 MG tablet  · ondansetron 4 MG disintegrating tablet         Activity: pelvic rest x 6 weeks, no lifting greater than 15 lbs  Diet: regular diet  Follow up: 3 weeks     Condition on discharge: good    Discharge date: 4/16/2022    Suad Long DO  Ob/Gyn    Comments:  Home care and follow-up care were reviewed. Pelvic rest, and birth control were reviewed. Signs and symptoms of mastitis and post partum depression were reviewed. The patient is to notify her physician if any of these occur. The patient was counseled on secondary smoke risks and the increased risk of sudden infant death syndrome and respiratory problems to her baby with exposure.  She was counseled on various alternate recommendations to decrease the exposure to secondary smoke to her children.

## 2022-04-19 LAB — SURGICAL PATHOLOGY REPORT: NORMAL

## 2022-04-24 PROBLEM — R52 PAIN: Status: RESOLVED | Noted: 2022-03-25 | Resolved: 2022-04-24

## 2022-04-28 ENCOUNTER — OFFICE VISIT (OUTPATIENT)
Dept: OBGYN CLINIC | Age: 32
End: 2022-04-28
Payer: COMMERCIAL

## 2022-04-28 VITALS — WEIGHT: 178 LBS | BODY MASS INDEX: 34.76 KG/M2 | DIASTOLIC BLOOD PRESSURE: 70 MMHG | SYSTOLIC BLOOD PRESSURE: 116 MMHG

## 2022-04-28 PROBLEM — Z34.90 THIRD PREGNANCY: Status: RESOLVED | Noted: 2021-10-25 | Resolved: 2022-04-28

## 2022-04-28 PROBLEM — N89.8 VAGINAL DISCHARGE DURING PREGNANCY IN THIRD TRIMESTER: Status: RESOLVED | Noted: 2022-02-25 | Resolved: 2022-04-28

## 2022-04-28 PROBLEM — O47.9 UTERINE CONTRACTIONS: Status: RESOLVED | Noted: 2022-04-14 | Resolved: 2022-04-28

## 2022-04-28 PROBLEM — O46.8X9 SUBCHORIONIC HEMATOMA, ANTEPARTUM: Status: RESOLVED | Noted: 2021-09-13 | Resolved: 2022-04-28

## 2022-04-28 PROBLEM — O09.92 HRP (HIGH RISK PREGNANCY), SECOND TRIMESTER: Status: RESOLVED | Noted: 2019-05-08 | Resolved: 2022-04-28

## 2022-04-28 PROBLEM — O26.893 VAGINAL DISCHARGE DURING PREGNANCY IN THIRD TRIMESTER: Status: RESOLVED | Noted: 2022-02-25 | Resolved: 2022-04-28

## 2022-04-28 PROBLEM — B37.31 CANDIDIASIS OF VAGINA: Status: RESOLVED | Noted: 2022-02-25 | Resolved: 2022-04-28

## 2022-04-28 PROBLEM — O46.90 VAGINAL BLEEDING DURING PREGNANCY, ANTEPARTUM: Status: RESOLVED | Noted: 2022-04-04 | Resolved: 2022-04-28

## 2022-04-28 PROBLEM — O99.013 ANEMIA AFFECTING PREGNANCY IN THIRD TRIMESTER: Status: RESOLVED | Noted: 2022-02-01 | Resolved: 2022-04-28

## 2022-04-28 PROBLEM — Z3A.32 32 WEEKS GESTATION OF PREGNANCY: Status: RESOLVED | Noted: 2022-02-25 | Resolved: 2022-04-28

## 2022-04-28 PROBLEM — O41.8X90 SUBCHORIONIC HEMATOMA, ANTEPARTUM: Status: RESOLVED | Noted: 2021-09-13 | Resolved: 2022-04-28

## 2022-04-28 PROCEDURE — 1036F TOBACCO NON-USER: CPT | Performed by: NURSE PRACTITIONER

## 2022-04-28 PROCEDURE — G8417 CALC BMI ABV UP PARAM F/U: HCPCS | Performed by: NURSE PRACTITIONER

## 2022-04-28 PROCEDURE — 1111F DSCHRG MED/CURRENT MED MERGE: CPT | Performed by: NURSE PRACTITIONER

## 2022-04-28 PROCEDURE — G8427 DOCREV CUR MEDS BY ELIG CLIN: HCPCS | Performed by: NURSE PRACTITIONER

## 2022-04-28 PROCEDURE — 99213 OFFICE O/P EST LOW 20 MIN: CPT | Performed by: NURSE PRACTITIONER

## 2022-04-28 NOTE — PROGRESS NOTES
process.  Chaperone: NA        Abdomen: Soft and non-tender; good bowel sounds; no guarding, rebound or rigidity; no CVA tenderness bilaterally. Extremities: No calf tenderness bilaterally. DTR 2/4 bilaterally. No edema. Perineum: intact    Assessment:   Diagnosis Orders   1. Postpartum care and examination       Chief Complaint   Patient presents with    Postpartum Care     Patient Active Problem List    Diagnosis Date Noted    Mood disorder ([facility] [address] 2016     Priority: High    Pelvic pain 2022     21 F Apg 8/9 Wt 7#10 2019    Depression     Cannabis abuse - unable to determine 2016         EPDS Score of 0        Plan:  1. Return to the office in  3-4 weeks  2. Signs & Symptoms of mastitis reviewed; notify if occurs  3. Secondary smoke risks reviewed. Increased risks of respiratory problems, Sudden     infant death syndrome, and potential malignancies. 4. Abstinence  5. Family planning counseling and STD counseling completed  6. Return in about 4 weeks (around 2022) for 6 week pp.   7. Desires to discuss possible ablation with TL     Patient was seen with total face to face time of 20 minutes. More than 50% of this visit was on counseling and education regarding her    Diagnosis Orders   1. Postpartum care and examination      and her options. She was also counseled on her preventative health maintenance recommendations and follow-up.

## 2022-05-27 ENCOUNTER — OFFICE VISIT (OUTPATIENT)
Dept: OBGYN CLINIC | Age: 32
End: 2022-05-27
Payer: COMMERCIAL

## 2022-05-27 VITALS
DIASTOLIC BLOOD PRESSURE: 70 MMHG | SYSTOLIC BLOOD PRESSURE: 110 MMHG | HEIGHT: 60 IN | BODY MASS INDEX: 35.14 KG/M2 | WEIGHT: 179 LBS

## 2022-05-27 PROCEDURE — 99213 OFFICE O/P EST LOW 20 MIN: CPT | Performed by: NURSE PRACTITIONER

## 2022-05-27 PROCEDURE — G8417 CALC BMI ABV UP PARAM F/U: HCPCS | Performed by: NURSE PRACTITIONER

## 2022-05-27 PROCEDURE — 1036F TOBACCO NON-USER: CPT | Performed by: NURSE PRACTITIONER

## 2022-05-27 PROCEDURE — G8427 DOCREV CUR MEDS BY ELIG CLIN: HCPCS | Performed by: NURSE PRACTITIONER

## 2022-05-27 NOTE — PROGRESS NOTES
Yoni Marc is a 36 y.o. female    Delivery Information:  Delivery Date: 4/15/2022    Sex of Baby: male  Type of Delivery: Vaginal  Delivering Physician: other    Post Partum Questions:  No Do you Smoke? If yes PPD is 0  No Do you intake caffeine? No Do you use street drugs? No Do you consume alcohol? Yes Are breast feeding? No Are you bottle feeding? No Are you having any problems with your breasts? (lumps, discharge, asymmetry, or redness)  No Are you having any problems with bowel movements or urination? No Are you having any vaginal discharge/itching/ or burning? Yes Are you getting help and support with the baby? No Have you had intercourse since your delivery? No If you had intercourse did you use condoms? No Have you had a menstrual cycle since delivery? Date: 0  No Do you have any questions that need to be addressed today? How long did you bleed after your delivery? 3 Weeks  What are your plans besides condoms for family planning? Desires TL  Who lives at home with you and your baby? Children, , and MIL    The patient was counseled on the risks of tobacco abuse and the harm it may cause to the patient and her  baby as well as others in the household from secondary smoke exposure. The patient was counseled on the risks of potential respiratory problems, cancers, and sudden infant death syndrome as well as other co-morbidities. These were discussed in detail. I reviewed cessation options and plans. The patient was counseled on smoking outdoors with appropriate covers of clothing and hair. She was counseled on hand washing prior to holding or picking up the baby. The patient had her questions answered in regards to the baby and was instructed to follow up with her pediatrician. She had reviewed with her safe sleep recommendations.     Vitals:    22 1011   BP: 110/70   Site: Left Upper Arm   Position: Sitting   Cuff Size: Medium Adult   Weight: 179 lb (81.2 kg) Height: 5' (1.524 m)        Physical Exam:  Chaperone for Intimate Exam   Chaperone was offered and accepted as part of the rooming process.  Chaperone: NA       General Appearance: This  is a well Developed, well Nourished, well groomed female. Her BMI was reviewed. Nutritional decision making was discussed. Skin:  There was a Normal Inspection of the skin without rashes or lesions. There were no rashes. (Papular, Maculopapular, Hives, Pustular, Macular)     There were no lesions (Ulcers, Erythema, Abn. Appearing Nevi)            Lymphatic:  No Lymph Nodes were Palpable in the neck , axilla or groin.  0 # Of Lymph Nodes; Location ; Character [Normal]  [Shotty] [Tender] [Enlarged]     Neck and EENT:  The neck was supple. There were no masses   The thyroid was not enlarged and had no masses. Perrla, EOMI B/L, TMI B/L No Abnormalities. Throat inspected-No exudates or Masses, Nares Patent No Masses        Respiratory: The lungs were auscultated and found to be clear. There were no rales, rhonchi or wheezes. There was a good respiratory effort. Cardiovascular: The heart was in a regular rate and rhythm. . No S3 or S4. There was no murmur appreciated. Location, grade, and radiation are not applicable. Extremities: The patients extremities were without calf tenderness, edema, or varicosities. There was full range of motion in all four extremities. Pulses in all four extremities were appreciated and are 2/4. Abdomen: The abdomen was soft and non-tender. There were good bowel sounds in all quadrants and there was no guarding, rebound or rigidity. On evaluation there was no evidence of hepatosplenomegaly and there was no costal vertebral fito tenderness bilaterally. No hernias were appreciated. Abdominal Scars: none    Psych:   The patient had a normal Orientation to: Time, Place, Person, and Situation  There is no Mood / Affect changes    Breast:  (Chest)  deferred  Self breast exams were reviewed in detail. Literature was given. Pelvic Exam:  Exam deferred. Rectal Exam:  exam declined by patient          Assessment:  1. Postpartum care and examination         Family planning was discussed    Signs and symptoms of mastitis were reviewed. The patient did not have any. Signs and symptoms of post partum depression were discussed the patient did not have any. Tobacco abuse and secondary smoke risks to the mother and her  baby were reviewed. Sudden infant death syndrome was discussed. Cessation and proper protections discussed in detail. Plan:  Return in about 4 weeks (around 2022) for follow up with physician discuss TL . Antibiotics and decreased efficacy with birth control reviewed  Barrier recommendations and STD counseling completed  S/S mastitis reviewed      Patient was seen with total face to face time of 20 minutes. More than 50% of this visit was on counseling and education regarding her    Diagnosis Orders   1. Postpartum care and examination      and her options. She was also counseled on her preventative health maintenance recommendations and follow-up.           Electronically signed by HALEY Cueva NP on 22 at 10:13 AM EDT

## 2022-05-27 NOTE — PATIENT INSTRUCTIONS
Patient Education        After Pregnancy: Exercises  Introduction  Here are some examples of exercises that can help after pregnancy. Start eachexercise slowly. Ease off the exercise if you start to have pain. Your doctor or physical therapist will tell you when you can start theseexercises and which ones will work best for you. How to do the exercises  Tummy tuck    1. Lie on your back, and place two fingers just inside your hip bones so you can feel your lower belly muscles. 2. Take a deep breath in.  3. As you breathe out, pull your belly button in toward your spine, as if you are trying to zip up a tight pair of jeans. You should feel your lower belly muscles pull slightly away from your fingers as the muscles tighten. 4. Hold for about 6 seconds, but do not hold your breath. 5. Repeat 8 to 12 times. 6. Repeat several times a day, and try to hold your lower belly muscles in for longer as you get stronger. 7. Practice doing this exercise while you are standing, such as when you are standing in line, or sitting. Heel slides    1. Lie on the floor with your knees bent, and place two fingers just inside your hip bones so you can feel your lower belly muscles. Your feet should be flat on the floor. 2. Pull your belly button in toward your spine. You should feel your lower belly muscles pull slightly away from your fingers as the muscles tighten. 3. Keep holding your belly button in as you slowly slide one foot along the floor until your leg is out straight. 4. Slowly slide the leg back to your starting position while making sure that you keep holding your belly button in.  5. Do not arch or move your back as you are doing this. Do not hold your breath. 6. Relax and repeat with your other leg. 7. Repeat 8 to 12 times. Knee-to-chest stretch    1. Lie on your back with one knee bent and the other leg straight.   2. Clasp your hands together under your bent knee and bring the knee to your chest. Keep your lower back pressed to the floor. 3. If it hurts your back to keep your opposite leg straight as you stretch, bend that knee too, and keep that foot flat on the floor. 4. Hold for at least 15 to 30 seconds. 5. Relax and lower the knee to the starting position. 6. Repeat with the other leg. 7. Repeat 2 to 4 times with each leg. Neck rotation    1. Sit in a firm chair, or stand up straight. 2. Keeping your chin level, turn your head to the right, and hold for 15 to 30 seconds. 3. Turn your head to the left and hold for 15 to 30 seconds. 4. Repeat 2 to 4 times to each side. Shoulder rolls    1. Sit comfortably with your feet shoulder-width apart. You can also do this exercise while standing. 2. Roll your shoulders up, then back, and then down in a smooth, circular motion. 3. Repeat 2 to 4 times. Midback stretch    If you have knee pain, do not do this exercise. 1. Kneel on the floor, and sit back on your ankles. 2. Lean forward, place your hands on the floor, and stretch your arms out in front of you. Rest your head between your arms. 3. Gently push your chest toward the floor, reaching as far in front of you as possible. 4. Hold for 15 to 30 seconds. 5. Repeat 2 to 4 times. Back stretches    1. Get down on your hands and knees on the floor. 2. Relax your head and allow it to droop. Round your back up toward the ceiling until you feel a nice stretch in your upper, middle, and lower back. Hold this stretch for as long as it feels comfortable, or about 15 to 30 seconds. 3. Return to the starting position with a flat back while you are on your hands and knees. 4. Let your back sway by pressing your stomach toward the floor. Lift your buttocks toward the ceiling. 5. Hold this position for 15 to 30 seconds. 6. Repeat 2 to 4 times. Hamstring stretch (lying down)    1. Lie flat on your back with your legs straight.  If you feel discomfort in your back, place a small towel roll under your lower back.  2. Holding the back of your leg, lift your leg straight up and toward your body until you feel a stretch at the back of your thigh. 3. Hold the stretch for at least 30 seconds. 4. Repeat 2 to 4 times. 5. Switch legs and repeat steps 1 through 4. Calf stretch    1. Stand facing a wall with your hands on the wall at about eye level. Put your leg about a step behind your other leg. 2. Keeping your back leg straight and your back heel on the floor, bend your front knee and gently bring your hip and chest toward the wall until you feel a stretch in the calf of your back leg. 3. Hold the stretch for 15 to 30 seconds. 4. Repeat 2 to 4 times. 5. Repeat steps 1 through 4, but this time keep your back knee bent. 6. Switch legs and repeat steps 1 through 5. Follow-up care is a key part of your treatment and safety. Be sure to make and go to all appointments, and call your doctor if you are having problems. It's also a good idea to know your test results and keep alist of the medicines you take. Where can you learn more? Go to [email or web address] and sign in to your [employer] account. Enter J493 in the [facility] box to learn more about \"After Pregnancy: Exercises. \"     If you do not have an account, please click on the \"Sign Up Now\" link. Current as of: June 16, 2021               Content Version: 13.2  © 1970-5534 Healthwise, Incorporated. Care instructions adapted under license by [facility] ([facility]). If you have questions about a medical condition or this instruction, always ask your healthcare professional. [place] [address] any warranty or liability for your use of this information.

## 2022-06-08 ENCOUNTER — TELEMEDICINE (OUTPATIENT)
Dept: OBGYN CLINIC | Age: 32
End: 2022-06-08
Payer: COMMERCIAL

## 2022-06-08 DIAGNOSIS — N92.1 MENORRHAGIA WITH IRREGULAR CYCLE: Primary | ICD-10-CM

## 2022-06-08 DIAGNOSIS — N94.6 DYSMENORRHEA, UNSPECIFIED: ICD-10-CM

## 2022-06-08 PROCEDURE — 99213 OFFICE O/P EST LOW 20 MIN: CPT | Performed by: OBSTETRICS & GYNECOLOGY

## 2022-06-08 PROCEDURE — G8427 DOCREV CUR MEDS BY ELIG CLIN: HCPCS | Performed by: OBSTETRICS & GYNECOLOGY

## 2022-06-08 NOTE — PROGRESS NOTES
Andrea Florian  2022    Andrea Florian (:  1990) has requested an audio/video evaluation for the following concern(s):    1. Menorrhagia with irregular cycle    2. Dysmenorrhea, unspecified          TELEHEALTH EVALUATION -- Audio/Visual (During TGSGY-32 public health emergency)      Andrea Florian is a 36 y.o. female [id number]      She was here to follow-up regarding her labs and diagnostics ordered at her last visit for the diagnosis of:    ICD-10-CM    1. Menorrhagia with irregular cycle  N92.1 CBC with Auto Differential     TSH with Reflex     APTT     Protime-INR     HCG, Quantitative, Pregnancy     US NON OB TRANSVAGINAL   2. Dysmenorrhea, unspecified  N94.6 CBC with Auto Differential     TSH with Reflex     APTT     Protime-INR     HCG, Quantitative, Pregnancy     US NON OB TRANSVAGINAL       She has any specific chief complaint today. Her bowels are regular and she is voiding without difficulty. Pt states her periods are heavy with clots occurring every 2 weeks. Pt has noticed it increasing since her delivery. Pt wishes to have an endometrial ablation with a medicall=y necessary slpingectomy. Pt was counseled on alternative options both hormonal and non-hormonal but declined. Pt to folllow up in office after sono/ labs for endometrial biopst/ endosee      Past Medical History:   Diagnosis Date    Anemia     Depression     TSH deficiency 2019         History reviewed. No pertinent surgical history.       Family History   Problem Relation Age of Onset    Hypertension Other         G.Parents    Depression Mother     Hypertension Mother     Alcohol Abuse Mother     Diabetes Mother     Diabetes Other         G.Parents    Diabetes Brother          Social History     Tobacco Use    Smoking status: Never Smoker    Smokeless tobacco: Never Used   Vaping Use    Vaping Use: Never used   Substance Use Topics    Alcohol use: Not Currently     Alcohol/week: 0.0 standard drinks Comment: Social    Drug use: Not Currently     Types: Marijuana Kimani Harrison     Comment: occ         MEDICATIONS:  Current Outpatient Medications   Medication Sig Dispense Refill    ibuprofen (ADVIL;MOTRIN) 800 MG tablet Take 1 tablet by mouth every 8 hours 120 tablet 3    ferrous sulfate (IRON 325) 325 (65 Fe) MG tablet Take 1 tablet by mouth 2 times daily 180 tablet 6    Prenatal Vit-Fe Fumarate-FA (PRENATAL VITAMIN) 27-0.8 MG TABS Take 1 tablet by mouth daily 30 tablet 12     No current facility-administered medications for this visit. ALLERGIES:  Allergies as of 06/08/2022    (No Known Allergies)         currently breastfeeding. PE is limited due to the virtual visit    Abdomen: Soft non-tender; good bowel sounds. No guarding, rebound or rigidity. No CVA tenderness bilaterally reported when questioned. (Viewed Virtually)    Extremities: No calf tenderness, DTR 2/4, and No edema bilaterally as inspected by video and palpation by the patient (Viewed Virtually)    Pelvic: (Virtual Visit-Not Completed)    Diagnostics:  No results found. Lab Results:  Results for orders placed or performed during the hospital encounter of 04/14/22   COVID-19, Rapid    Specimen: Nasopharyngeal Swab   Result Value Ref Range    Specimen Description . NASOPHARYNGEAL SWAB     SARS-CoV-2, Rapid Not Detected Not Detected   DRUG SCREEN MULTI URINE   Result Value Ref Range    Amphetamine Screen, Ur NEGATIVE NEGATIVE    Barbiturate Screen, Ur NEGATIVE NEGATIVE    Benzodiazepine Screen, Urine NEGATIVE NEGATIVE    Cocaine Metabolite, Urine NEGATIVE NEGATIVE    Methadone Screen, Urine NEGATIVE NEGATIVE    Opiates, Urine NEGATIVE NEGATIVE    Phencyclidine, Urine NEGATIVE NEGATIVE    Cannabinoid Scrn, Ur NEGATIVE NEGATIVE    Oxycodone Screen, Ur NEGATIVE NEGATIVE    Test Information       Assay provides medical screening only.   The absence of expected drug(s) and/or metabolite(s) may indicate diluted or adulterated urine, limitations of testing or timing of collection. T. pallidum Ab   Result Value Ref Range    T. pallidum, IgG NONREACTIVE NONREACTIVE   CBC   Result Value Ref Range    WBC 11.5 (H) 3.5 - 11.0 k/uL    RBC 3.90 (L) 4.0 - 5.2 m/uL    Hemoglobin 10.1 (L) 12.0 - 16.0 g/dL    Hematocrit 30.8 (L) 36 - 46 %    MCV 78.9 (L) 80 - 100 fL    MCH 26.0 26 - 34 pg    MCHC 33.0 31 - 37 g/dL    RDW 15.2 (H) 11.5 - 14.9 %    Platelets 960 794 - 984 k/uL    MPV 6.9 6.0 - 12.0 fL   SURGICAL PATHOLOGY REPORT   Result Value Ref Range    Surgical Pathology Report       -- Diagnosis --    Placenta, third trimester:    -  Meconium staining.    -  Umbilical cord and fetal membranes, free of inflammation.    -  Plate, no histologic abnormality. Hemanth Lincoln M.D.  **Electronically Signed Out**         rdd/2022       Clinical Information  Pre-op Diagnosis:  GA: 44 W, 6 D    Operative Findings:  BOY, AP8, WT: 8#, 7 OZ, PLACENTA    Source of Specimen  A: PLACENTA    Gross Description  \"CRISTOBALIANCA BOOKER, UNDESIGNATED\" Placenta with attached membranes and  umbilical cord.     UMBILICAL CORD         Length:     32.0 cm       Diameter:     1.5 cm  True knots:     No  Number of vessels:     3  Spiraling:     Normal  Insertion into fetal surface:     Paracentral    MEMBRANES  Color:     Tan and opacified with faint green discoloration and a  marginal insertion  Meconium staining:     Possible, faint    FETAL SURFACE  Color:     Purple-gray, with a normal array of surface vessels  Subchorionic fibrin:     Marginal and involves approximately 5% o f the  disc    MATERNAL SURFACE  Cotyledons:            -All present and intact:     Yes  -Focal lesions:     No    Placental size:     19.0 x 17.0 x 3.0 cm  Shape:     Ovoid  Weight:     430 grams  Number of cassettes:     3cs  tm      Microscopic Description  Umbilical cord:               Negative for funisitisMembranes:                Negative for chorioamnionitisMeconium staining:          A few  amniotic macrophages show brown granular cytoplasmic pigment  compatible with meconium. Infarcts:                    NoIntervillous  thrombi:          NoSubchorionic fibrin:          Not increasedVillous  maturation:          MatureNucleated erythrocytes in villous  capillaries:          RarePlate:          No histologic abnormality    SURGICAL PATHOLOGY CONSULTATION       Patient Name: Letha Gonzales: [id number]  Path Number: [id number]    [facility] [address] [place], [address]  [phone]  Fax:  [phone]     TYPE AND SCREEN   Result Value Ref Range    Expiration Date 2022,2359     Arm Band Number [id number]     ABO/Rh O POSITIVE     Antibody Screen NEGATIVE            Assessment:   Diagnosis Orders   1. Menorrhagia with irregular cycle  CBC with Auto Differential    TSH with Reflex    APTT    Protime-INR    HCG, Quantitative, Pregnancy    US NON OB TRANSVAGINAL   2. Dysmenorrhea, unspecified  CBC with Auto Differential    TSH with Reflex    APTT    Protime-INR    HCG, Quantitative, Pregnancy    US NON OB TRANSVAGINAL     Chief Complaint   Patient presents with    Postpartum Care         Patient Active Problem List    Diagnosis Date Noted    [facility] 2016     Priority: High    Pelvic pain 2022     21 F Apg 8/9 Wt 7#10 2019    Depression     Cannabis abuse - unable to determine 2016       PLAN:  Return in about 4 weeks (around 2022) for endo Bx/ endosee. Return to the office in 3-4 weeks. Counseled on preventative health maintenance follow-up. Orders Placed This Encounter   Procedures    US NON OB TRANSVAGINAL     Begin with transabdominal imaging.      Standing Status:   Future     Standing Expiration Date:   2023     Order Specific Question:   Reason for exam:     Answer:   abnormal bleeding    CBC with Auto Differential     Standing Status: Future     Standing Expiration Date:   6/8/2023    TSH with Reflex     Standing Status:   Future     Standing Expiration Date:   6/8/2023    APTT     Standing Status:   Future     Standing Expiration Date:   6/8/2023     Order Specific Question:   Daily Heparin Dose? Answer:   Tete Wiseman Protime-INR     Standing Status:   Future     Standing Expiration Date:   6/8/2023     Order Specific Question:   Daily Coumadin Dose? Answer:   N    HCG, Quantitative, Pregnancy     Standing Status:   Future     Standing Expiration Date:   6/8/2023         Eric Spann is a 36 y.o. female female was evaluated by a Virtual Visit (video visit) encounter to address concerns as mentioned above. A caregiver was present when appropriate. Due to this being a TeleHealth encounter (During [facility]- public health emergency), evaluation of the following organ systems was limited: Vitals/Constitutional/EENT/Resp/CV/GI//MS/Neuro/Skin/Heme-Lymph-Imm. Pursuant to the emergency declaration under the [address] [place] [address] authority and the [employer] Act, this Virtual Visit was conducted with patient's (and/or legal guardian's) consent, to reduce the patient's risk of exposure to COVID-19 and provide necessary medical care. The patient (and/or legal guardian) has also been advised to contact this office for worsening conditions or problems, and seek emergency medical treatment and/or call 911 if deemed necessary. Services were provided through a video synchronous discussion virtually to substitute for in-person clinic visit. Patient and provider were located at their individual homes. Electronically signed by Cameron Castillo DO on 6/8/22 at 9:23 AM EDT     An electronic signature was used to authenticate this note.           The patient, Eric Spann is a 36 y.o. female, was seen with a total time spent of 20 minutes for the visit on this date of service by the E/M provider. The time component had both face to face and non face to face time spent in determining the total time component. Counseling and education regarding her diagnosis listed below and her options regarding those diagnoses were also included in determining her time component. Diagnosis Orders   1. Menorrhagia with irregular cycle  CBC with Auto Differential    TSH with Reflex    APTT    Protime-INR    HCG, Quantitative, Pregnancy    US NON OB TRANSVAGINAL   2. Dysmenorrhea, unspecified  CBC with Auto Differential    TSH with Reflex    APTT    Protime-INR    HCG, Quantitative, Pregnancy    US NON OB TRANSVAGINAL        The patient had her preventative health maintenance recommendations and follow-up reviewed with her at the completion of her visit.

## 2022-07-12 ENCOUNTER — OFFICE VISIT (OUTPATIENT)
Dept: OBGYN CLINIC | Age: 32
End: 2022-07-12
Payer: COMMERCIAL

## 2022-07-12 DIAGNOSIS — N92.1 MENORRHAGIA WITH IRREGULAR CYCLE: ICD-10-CM

## 2022-07-12 DIAGNOSIS — N94.6 DYSMENORRHEA, UNSPECIFIED: ICD-10-CM

## 2022-07-12 PROCEDURE — 76856 US EXAM PELVIC COMPLETE: CPT | Performed by: OBSTETRICS & GYNECOLOGY

## 2022-07-13 ENCOUNTER — TELEPHONE (OUTPATIENT)
Dept: OBGYN CLINIC | Age: 32
End: 2022-07-13

## 2022-07-13 NOTE — TELEPHONE ENCOUNTER
----- Message from HALEY Alcantar CNP sent at 7/13/2022  7:57 AM EDT -----  Small bilateral ovarian follicles noted  Previously met with physician and wants ablation with medically necessary salpingectomy  Was to be scheduled for endometrial biopsy/endosee  Please let patient know results and schedule endometrial biopsy with physician.

## 2022-07-13 NOTE — TELEPHONE ENCOUNTER
Per Thais Myers NP, pt updated on pelvic US results of small bilateral ovarian follicles. Pt to be scheduled for endometrial biopsy/endosee with physician. Pt information given to Yonny Crabtree to schedule. Pt verbalized understanding.

## 2022-07-20 ENCOUNTER — TELEPHONE (OUTPATIENT)
Dept: OBGYN CLINIC | Age: 32
End: 2022-07-20

## 2022-07-20 NOTE — TELEPHONE ENCOUNTER
Pt is scheduled for her EMB in September to have surgery done , pt wants to be on [facility] for now , her lmp was 071/13/22 pt has not had intercourse for 2 weeks .  Please call pt to discuss bc being issued pt was recently seen , [facility]

## 2022-07-21 NOTE — TELEPHONE ENCOUNTER
Left detailed message for patient to contact the office, per [place], for a scheduled appointment regarding her birthcontrol request.

## 2022-07-27 ENCOUNTER — OFFICE VISIT (OUTPATIENT)
Dept: OBGYN CLINIC | Age: 32
End: 2022-07-27
Payer: COMMERCIAL

## 2022-07-27 VITALS
WEIGHT: 178 LBS | BODY MASS INDEX: 34.95 KG/M2 | HEIGHT: 60 IN | DIASTOLIC BLOOD PRESSURE: 70 MMHG | SYSTOLIC BLOOD PRESSURE: 112 MMHG

## 2022-07-27 DIAGNOSIS — N92.1 MENORRHAGIA WITH IRREGULAR CYCLE: Primary | ICD-10-CM

## 2022-07-27 DIAGNOSIS — Z32.02 NEGATIVE PREGNANCY TEST: ICD-10-CM

## 2022-07-27 LAB
CONTROL: NORMAL
PREGNANCY TEST URINE, POC: NEGATIVE

## 2022-07-27 PROCEDURE — 81025 URINE PREGNANCY TEST: CPT | Performed by: NURSE PRACTITIONER

## 2022-07-27 PROCEDURE — G8417 CALC BMI ABV UP PARAM F/U: HCPCS | Performed by: NURSE PRACTITIONER

## 2022-07-27 PROCEDURE — 99213 OFFICE O/P EST LOW 20 MIN: CPT | Performed by: NURSE PRACTITIONER

## 2022-07-27 PROCEDURE — 1036F TOBACCO NON-USER: CPT | Performed by: NURSE PRACTITIONER

## 2022-07-27 PROCEDURE — G8427 DOCREV CUR MEDS BY ELIG CLIN: HCPCS | Performed by: NURSE PRACTITIONER

## 2022-07-27 RX ORDER — MEDROXYPROGESTERONE ACETATE 150 MG/ML
150 INJECTION, SUSPENSION INTRAMUSCULAR ONCE
Status: COMPLETED | OUTPATIENT
Start: 2022-07-27 | End: 2022-07-27

## 2022-07-27 RX ADMIN — MEDROXYPROGESTERONE ACETATE 150 MG: 150 INJECTION, SUSPENSION INTRAMUSCULAR at 12:42

## 2022-07-27 NOTE — PROGRESS NOTES
Jay Isaac  2022    YOB: 1990          The patient was seen today. She is here regarding desire to start on depo until she has her ablation with TL in 2022. Menses are heavy and painful. States last menses she had to call off of work it was so heavy. Last intercourse over 2 weeks ago. UPT neg. Her bowels are regular and she is voiding without difficulty. HPI:  Jay Isaac is a 36 y.o. female  menorrhagia       OB History    Para Term  AB Living   3 3 3 0 0 3   SAB IAB Ectopic Molar Multiple Live Births   0 0 0 0 0 3      # Outcome Date GA Lbr Kevin/2nd Weight Sex Delivery Anes PTL Lv   3 Term 04/15/22 39w6d 00:25 / 00:16 8 lb 7.1 oz (3.83 kg) M Vag-Spont None N JOAN      Name: Romayne Evan: 7  Apgar5: 8   2 Term 19 38w6d 02:07 / 00:05 7 lb 10.8 oz (3.48 kg) F Vag-Spont None N JOAN      Complications: Meconium at birth      Name: Elvi Chill: 8  Apgar5: 9   1 Term 2014 38w1d  6 lb 14 oz (3.118 kg) F Vag-Spont   JOAN       Past Medical History:   Diagnosis Date    Anemia     Depression     TSH deficiency 2019       History reviewed. No pertinent surgical history.     Family History   Problem Relation Age of Onset    Hypertension Other         G.Parents    Depression Mother     Hypertension Mother     Alcohol Abuse Mother     Diabetes Mother     Diabetes Other         G.Parents    Diabetes Brother        Social History     Socioeconomic History    Marital status: Single     Spouse name: Not on file    Number of children: Not on file    Years of education: Not on file    Highest education level: Not on file   Occupational History    Not on file   Tobacco Use    Smoking status: Never    Smokeless tobacco: Never   Vaping Use    Vaping Use: Never used   Substance and Sexual Activity    Alcohol use: Not Currently     Alcohol/week: 0.0 standard drinks     Comment: Social    Drug use: Not Currently Types: Marijuana Estil Catena)     Comment: occ    Sexual activity: Yes     Partners: Male   Other Topics Concern    Not on file   Social History Narrative    Not on file     Social Determinants of Health     Financial Resource Strain: Not on file   Food Insecurity: Not on file   Transportation Needs: Not on file   Physical Activity: Not on file   Stress: Not on file   Social Connections: Not on file   Intimate Partner Violence: Not on file   Housing Stability: Not on file         MEDICATIONS:  No current outpatient medications on file. Current Facility-Administered Medications   Medication Dose Route Frequency Provider Last Rate Last Admin    medroxyPROGESTERone (DEPO-PROVERA) injection 150 mg  150 mg IntraMUSCular Once HALEY Benavidez NP                 ALLERGIES:  Allergies as of 07/27/2022    (No Known Allergies)         REVIEW OF SYSTEMS:    yes     A minimum of an eleven point review of systems was completed. Review Of Systems (11 point):  Constitutional: No fever, chills or malaise; No weight change or fatigue  Head and Eyes: No vision, Headache, Dizziness or trauma in last 12 months  ENT ROS: No hearing, Tinnitis, sinus or taste problems  Hematological and Lymphatic ROS:No Lymphoma, Von Willebrand's, Hemophillia or Bleeding History  Psych ROS: No Depression, Homicidal thoughts,suicidal thoughts, or anxiety  Breast ROS: No prior breast abnormalities or lumps  Respiratory ROS: No SOB, Pneumoniae,Cough, or Pulmonary Embolism History  Cardiovascular ROS: No Chest Pain with Exertion, Palpitations, Syncope, Edema, Arrhythmia  Gastrointestinal ROS: No Indigestion, Heartburn, Nausea, vomiting, Diarrhea, Constipation,or Bowel Changes; No Bloody Stools or melena  Genito-Urinary ROS: No Dysuria, Hematuria or Nocturia.  No Urinary Incontinence or Vaginal Discharge  Musculoskeletal ROS: No Arthralgia, Arthritis,Gout,Osteoporosis or Rheumatism  Neurological ROS: No CVA, Migraines, Epilepsy, Seizure Hx, or Limb Weakness  Dermatological ROS: No Rash, Itching, Hives, Mole Changes or Cancer          Blood pressure 112/70, height 5' (1.524 m), weight 178 lb (80.7 kg), last menstrual period 07/08/2022, currently breastfeeding. Chaperone for Intimate Exam  Chaperone was offered and accepted as part of the rooming process. Chaperone: NA         Abdomen: Soft non-tender; good bowel sounds. No guarding, rebound or rigidity. No CVA tenderness bilaterally. Extremities: No calf tenderness, DTR 2/4, and No edema bilaterally    Pelvic: Exam deferred. Diagnostics:  US PELVIS COMPLETE    Result Date: 7/12/2022  GYNECOLOGY ULTRASOUND REPORT [facility] [address]; [address] [phone] mn [phone] Fax 7/12/2022 MRN: [id number] Contact Serial #: [id number] Kayla Bazan YOB: 1990 Age: 36 y.o. The ultrasound images were reviewed. Please see the attached ultrasound report. Ultrasonographer: Brandy Page RDMS Assessment: Kayla Bazan is a 36 y.o. female Menorrhagia with irregular cycle Dysmenorrhea, unspecified Specific Ultrasound Imaging Obtained: Transabdominal Approach: Yes Transvaginal Approach: No Limitations of Study Encountered requiring Trans Vaginal imaging: Overlying bowel & gas limiting the study: No Poor prep for procedure limiting study: No Elevated BMI limiting study: No Ovaries are NOT seen on Transabdominal imaging or a Retroverted uterus is present. No Findings: 1. The Uterus is homogeneous and anteverted (8.42 x 6.37 x 4.73 cm) 2. The Endometrial Stripe measurement is 0.89 cm 3. The Left Ovary is without masses or cysts 4. The Right Ovary is without masses or cysts 5. There is not an abnormal amount of cul-de-sac fluid 6. Small bilateral ovarian follicles Electronically signed by Benjamín Hansen DO on 7/12/22 at 6:40 PM EDT     1. The Uterus is homogeneous and anteverted (8.42 x 6.37 x 4.73 cm) 2.  The Endometrial Stripe measurement is 0.89 cm 3. The Left Ovary is without masses or cysts 4. The Right Ovary is without masses or cysts 5. There is not an abnormal amount of cul-de-sac fluid 6. Small bilateral ovarian follicles       Lab Results:  Results for orders placed or performed in visit on 22   POCT urine pregnancy   Result Value Ref Range    Preg Test, Ur negative     Control done          Assessment:   Diagnosis Orders   1. Menorrhagia with irregular cycle  medroxyPROGESTERone (DEPO-PROVERA) injection 150 mg      2. Negative pregnancy test  POCT urine pregnancy        Patient Active Problem List    Diagnosis Date Noted    Mood disorder ([facility] [address] 2016     Priority: High    Pelvic pain 2022     21 F Apg 8/9 Wt 7#10 2019    Depression     Cannabis abuse - unable to determine 2016           PLAN:  Return in about 2 months (around 2022) for annual.  UPT neg  Depo given  Repeat Annual every 1 year  Cervical Cytology Evaluation begins at 24years old. If Negative Cytology, Follow-up screening per current guidelines. Return to the office in 8 weeks. Counseled on preventative health maintenance follow-up. Orders Placed This Encounter   Procedures    POCT urine pregnancy           The patient, Yasmeen Mullen is a 36 y.o. female, was seen with a total time spent of 20 minutes for the visit on this date of service by the E/M provider. The time component had both face to face and non face to face time spent in determining the total time component. Counseling and education regarding her diagnosis listed below and her options regarding those diagnoses were also included in determining her time component. Diagnosis Orders   1. Menorrhagia with irregular cycle  medroxyPROGESTERone (DEPO-PROVERA) injection 150 mg      2.  Negative pregnancy test  POCT urine pregnancy           The patient had her preventative health maintenance recommendations and follow-up reviewed with her at the completion of

## 2022-09-22 ENCOUNTER — HOSPITAL ENCOUNTER (OUTPATIENT)
Age: 32
Discharge: HOME | End: 2022-09-22
Payer: COMMERCIAL

## 2022-09-22 DIAGNOSIS — N94.6 DYSMENORRHEA, UNSPECIFIED: ICD-10-CM

## 2022-09-22 DIAGNOSIS — N92.1 MENORRHAGIA WITH IRREGULAR CYCLE: ICD-10-CM

## 2022-09-22 LAB
ABSOLUTE EOS #: 0.1 K/UL (ref 0–0.4)
ABSOLUTE LYMPH #: 2.6 K/UL (ref 1–4.8)
ABSOLUTE MONO #: 0.8 K/UL (ref 0.1–1.3)
BASOPHILS # BLD: 0 % (ref 0–2)
BASOPHILS ABSOLUTE: 0 K/UL (ref 0–0.2)
EOSINOPHILS RELATIVE PERCENT: 1 % (ref 0–4)
HCG QUANTITATIVE: <1 MIU/ML
HCT VFR BLD CALC: 37.4 % (ref 36–46)
HEMOGLOBIN: 12.5 G/DL (ref 12–16)
INR BLD: 1
LYMPHOCYTES # BLD: 22 % (ref 24–44)
MCH RBC QN AUTO: 27.8 PG (ref 26–34)
MCHC RBC AUTO-ENTMCNC: 33.4 G/DL (ref 31–37)
MCV RBC AUTO: 83.2 FL (ref 80–100)
MONOCYTES # BLD: 6 % (ref 1–7)
PARTIAL THROMBOPLASTIN TIME: 31.8 SEC (ref 24–36)
PDW BLD-RTO: 15.2 % (ref 11.5–14.9)
PLATELET # BLD: 387 K/UL (ref 150–450)
PMV BLD AUTO: 7.5 FL (ref 6–12)
PROTHROMBIN TIME: 13.1 SEC (ref 11.8–14.6)
RBC # BLD: 4.5 M/UL (ref 4–5.2)
SEG NEUTROPHILS: 71 % (ref 36–66)
SEGMENTED NEUTROPHILS ABSOLUTE COUNT: 8.6 K/UL (ref 1.3–9.1)
TSH SERPL DL<=0.05 MIU/L-ACNC: 1.32 UIU/ML (ref 0.3–5)
WBC # BLD: 12.1 K/UL (ref 3.5–11)

## 2022-09-22 PROCEDURE — 85730 THROMBOPLASTIN TIME PARTIAL: CPT

## 2022-09-22 PROCEDURE — 84443 ASSAY THYROID STIM HORMONE: CPT

## 2022-09-22 PROCEDURE — 84702 CHORIONIC GONADOTROPIN TEST: CPT

## 2022-09-22 PROCEDURE — 36415 COLL VENOUS BLD VENIPUNCTURE: CPT

## 2022-09-22 PROCEDURE — 85025 COMPLETE CBC W/AUTO DIFF WBC: CPT

## 2022-09-22 PROCEDURE — 85610 PROTHROMBIN TIME: CPT

## 2022-09-23 ENCOUNTER — PROCEDURE VISIT (OUTPATIENT)
Dept: OBGYN CLINIC | Age: 32
End: 2022-09-23
Payer: COMMERCIAL

## 2022-09-23 ENCOUNTER — HOSPITAL ENCOUNTER (OUTPATIENT)
Age: 32
Setting detail: SPECIMEN
Discharge: HOME | End: 2022-09-23

## 2022-09-23 VITALS
DIASTOLIC BLOOD PRESSURE: 76 MMHG | BODY MASS INDEX: 34.95 KG/M2 | SYSTOLIC BLOOD PRESSURE: 118 MMHG | WEIGHT: 178 LBS | HEIGHT: 60 IN

## 2022-09-23 DIAGNOSIS — N94.6 DYSMENORRHEA, UNSPECIFIED: ICD-10-CM

## 2022-09-23 DIAGNOSIS — N92.1 MENORRHAGIA WITH IRREGULAR CYCLE: ICD-10-CM

## 2022-09-23 DIAGNOSIS — N94.6 DYSMENORRHEA: Primary | ICD-10-CM

## 2022-09-23 LAB
CONTROL: NORMAL
PREGNANCY TEST URINE, POC: NEGATIVE

## 2022-09-23 PROCEDURE — 81025 URINE PREGNANCY TEST: CPT | Performed by: OBSTETRICS & GYNECOLOGY

## 2022-09-23 PROCEDURE — 58558 HYSTEROSCOPY BIOPSY: CPT | Performed by: OBSTETRICS & GYNECOLOGY

## 2022-09-23 NOTE — PROGRESS NOTES
0.1 - 1.3 k/uL Final    Absolute Eos # 2022 0.10  0.0 - 0.4 k/uL Final    Basophils Absolute 2022 0.00  0.0 - 0.2 k/uL Final   ]    Diagnosis:    Diagnosis Orders   1. Dysmenorrhea  POCT urine pregnancy    IA HYSTEROSCOPY,W/ENDO BX    Specimen to Pathology Outpatient      2. Menorrhagia with irregular cycle        3. Dysmenorrhea, unspecified          Patient Active Problem List    Diagnosis Date Noted    [facility] 2016     Priority: High    Pelvic pain 2022     19 F Apg 8/9 Wt 7#10 2019    Depression     Cannabis abuse - unable to determine 2016       Chaperone for Intimate Exam  Chaperone was offered and accepted as part of the rooming process. Chaperone: Joya        A time out was called by the Chaperone listed above while ALL participants were quiet and attentive. The procedure to be completed was confirmed, with the appropriate laterality demarcated prior, if appropriate, as well as the patients name and a unique identifier, her date of birth. All questions were answered to her satisfaction. The consent was signed prior to the time out and all the risks were discussed in detail. Procedure Note:  After voiding, the patient returned to the exam room where she was placed in the dorsal- lithotomy position. A bimanual examination was performed without pathological changes from her most recent history and physical examination; small AV. A bi-valve speculum was then placed into the vaginal vault allowing visualization of the cervix. The surgical field was then cleansed with betadine. Under direct visualization an allis was placed on the anterior cervical lip. The uterus was sounded to 8 cm. A Endosee  Hysteroscope  was then placed thru the endocervical canal and into the endometrial cavity without any complications. A total of 20 ml of normal saline was instilled to aid in visualization of the endometrial cavity anatomy, video was completed.  The optics during the case  was limited and the entire cavity was not able to be visualized. During the hysteroscopic procedure an endometrial sampling was performed without incident. Pathology is pending. The patient tolerated the procedure well, the Allis was removed off the anterior cervical lip and there was noted to be adequate hemostasis. The patient was given restrictions and will follow-up in the office in 10-14 days. She will report any abdominal pain, heavy vaginal bleeding or a temperature of greater than 100.4. Hysteroscopic Findings:  Normal appearing but limited visualization    Assessment:   Diagnosis Orders   1. Dysmenorrhea  POCT urine pregnancy    MO HYSTEROSCOPY,W/ENDO BX    Specimen to Pathology Outpatient      2. Menorrhagia with irregular cycle        3. Dysmenorrhea, unspecified          Patient Active Problem List    Diagnosis Date Noted    [facility] 2016     Priority: High    Pelvic pain 2022     19 F Apg 8/9 Wt 7#10 2019    Depression     Cannabis abuse - unable to determine 2016           Recommendations:  Return to the office for follow-up in 3-4 weeks to review the pathology of the biopsy and for further recommendations. Patient is considering Hysteroscopy with novasure endometrial ablation with medically indicated bilateral salpingectomy.       Augusto May, DO

## 2022-09-27 LAB — SURGICAL PATHOLOGY REPORT: NORMAL

## 2022-11-17 ENCOUNTER — PREP FOR PROCEDURE (OUTPATIENT)
Dept: OBGYN CLINIC | Age: 32
End: 2022-11-17

## 2022-11-17 ENCOUNTER — OFFICE VISIT (OUTPATIENT)
Dept: OBGYN CLINIC | Age: 32
End: 2022-11-17
Payer: COMMERCIAL

## 2022-11-17 VITALS
DIASTOLIC BLOOD PRESSURE: 72 MMHG | WEIGHT: 175 LBS | SYSTOLIC BLOOD PRESSURE: 116 MMHG | BODY MASS INDEX: 34.36 KG/M2 | HEIGHT: 60 IN

## 2022-11-17 DIAGNOSIS — N94.6 DYSMENORRHEA, UNSPECIFIED: ICD-10-CM

## 2022-11-17 DIAGNOSIS — N92.1 MENORRHAGIA WITH IRREGULAR CYCLE: ICD-10-CM

## 2022-11-17 DIAGNOSIS — N92.1 MENORRHAGIA WITH IRREGULAR CYCLE: Primary | ICD-10-CM

## 2022-11-17 DIAGNOSIS — Z01.818 PREOP TESTING: ICD-10-CM

## 2022-11-17 DIAGNOSIS — N94.6 DYSMENORRHEA, UNSPECIFIED: Primary | ICD-10-CM

## 2022-11-17 PROCEDURE — G8427 DOCREV CUR MEDS BY ELIG CLIN: HCPCS | Performed by: OBSTETRICS & GYNECOLOGY

## 2022-11-17 PROCEDURE — 99213 OFFICE O/P EST LOW 20 MIN: CPT | Performed by: OBSTETRICS & GYNECOLOGY

## 2022-11-17 PROCEDURE — G8484 FLU IMMUNIZE NO ADMIN: HCPCS | Performed by: OBSTETRICS & GYNECOLOGY

## 2022-11-17 PROCEDURE — G8417 CALC BMI ABV UP PARAM F/U: HCPCS | Performed by: OBSTETRICS & GYNECOLOGY

## 2022-11-17 PROCEDURE — 1036F TOBACCO NON-USER: CPT | Performed by: OBSTETRICS & GYNECOLOGY

## 2022-11-17 RX ORDER — SODIUM CHLORIDE 0.9 % (FLUSH) 0.9 %
5-40 SYRINGE (ML) INJECTION PRN
OUTPATIENT
Start: 2022-11-17

## 2022-11-17 RX ORDER — SODIUM CHLORIDE 9 MG/ML
INJECTION, SOLUTION INTRAVENOUS PRN
OUTPATIENT
Start: 2022-11-17

## 2022-11-17 RX ORDER — SODIUM CHLORIDE 0.9 % (FLUSH) 0.9 %
5-40 SYRINGE (ML) INJECTION EVERY 12 HOURS SCHEDULED
OUTPATIENT
Start: 2022-11-17

## 2022-11-17 RX ORDER — SODIUM CHLORIDE, SODIUM LACTATE, POTASSIUM CHLORIDE, CALCIUM CHLORIDE 600; 310; 30; 20 MG/100ML; MG/100ML; MG/100ML; MG/100ML
INJECTION, SOLUTION INTRAVENOUS CONTINUOUS
OUTPATIENT
Start: 2022-11-17

## 2022-11-17 NOTE — H&P
[id number] [facility]  [address]; [address] [facility] [address]  [address]  [phone] mn [phone] Fax        Katelynn De Guzman  1990  Primary Care Physician: No primary care provider on file. HISTORY AND PHYSICAL      [facility]: [facility]      2022  MEDICAID CONSENT COMPLETED: No      HPI: Katelynn De Guzman is a 36 y.o. female   she is being seen for the problems listed below and is planning surgical intervention. She was counseled on all conservative medical and surgical options as well as definitive procedures as well. 1. Menorrhagia with irregular cycle    2. Dysmenorrhea, unspecified          Relevant Past History:   Patient Active Problem List    Diagnosis Date Noted    Mood disorder ([facility] [address] 2016     Priority: High    Pelvic pain 2022     19 F Apg 8/9 Wt 7#10 2019    Depression     Cannabis abuse - unable to determine 2016         OB History    Para Term  AB Living   3 3 3 0 0 3   SAB IAB Ectopic Molar Multiple Live Births   0 0 0 0 0 3      # Outcome Date GA Lbr Kevin/2nd Weight Sex Delivery Anes PTL Lv   3 Term 04/15/22 39w6d 00:25 / 00:16 8 lb 7.1 oz (3.83 kg) M Vag-Spont None N JOAN      Name: Kasuhik Albino: 7  Apgar5: 8   2 Term 19 38w6d 02:07 / 00:05 7 lb 10.8 oz (3.48 kg) F Vag-Spont None N JOAN      Complications: Meconium at birth      Name: Delaney Brightly: 8  Apgar5: 9   1 Term 2014 38w1d  6 lb 14 oz (3.118 kg) F Vag-Spont   JOAN       Past Medical History:   Diagnosis Date    Anemia     Depression     TSH deficiency 2019       History reviewed. No pertinent surgical history.     Social History     Socioeconomic History    Marital status: Single     Spouse name: Not on file    Number of children: Not on file    Years of education: Not on file    Highest education level: Not on file   Occupational History    Not on file   Tobacco Use    Smoking status: Never    Smokeless tobacco: Never   Vaping Use    Vaping Use: Never used   Substance and Sexual Activity    Alcohol use: Not Currently     Alcohol/week: 0.0 standard drinks     Comment: Social    Drug use: Not Currently     Types: Marijuana Levon Moeller)     Comment: occ    Sexual activity: Yes     Partners: Male   Other Topics Concern    Not on file   Social History Narrative    Not on file     Social Determinants of Health     Financial Resource Strain: Not on file   Food Insecurity: Not on file   Transportation Needs: Not on file   Physical Activity: Not on file   Stress: Not on file   Social Connections: Not on file   Intimate Partner Violence: Not on file   Housing Stability: Not on file       Psychosocial History: denies    MEDICATIONS:  No current outpatient medications on file. No current facility-administered medications for this visit. ALLERGIES:  Allergies as of 11/17/2022    (No Known Allergies)           REVIEW OF SYSTEMS:      General appearance:Appears healthy. Alert; in no acute distress. Pleasant. HEENT: Unremarkable   CARDIOVASCULAR: Denies: chest pain, dyspnea on exertion, palpitations  RESPIRATORY: Denies: cough, shortness of breath, wheezing  GI: Denies: abdominal pain, flank pain, nausea, vomiting, diarrhea  : Denies: dysuria, frequency/urgency, hematuria, pelvic pain  MUSCULOSKELETAL: Denies: back pain, joint swelling, muscle pain  SKIN: Denies: rash, hives. HEMATOLOGIC: Denies Bleeding Disorder, Coagulopathy or Transfusion  NEUROLOGIC: Denies Migraines, Headaches, CVA, TIA  ENDOCRINE: Denies any Endocrinologic disorders                PHYSICAL EXAM:  Blood pressure 116/72, height 5' (1.524 m), weight 175 lb (79.4 kg), last menstrual period 11/04/2022, currently breastfeeding.       General Appearance:  awake, alert, oriented, in no acute distress, well developed, well nourished, in no acute distress   Skin:  Skin color, texture, turgor normal. No rashes or lesions  Mouth/Throat: Mucosa moist.  No lesions. Pharynx without erythema, edema or exudate. Lungs:  Normal expansion. Clear to auscultation. No rales, rhonchi, or wheezing. Heart:  Heart sounds are normal.  Regular rate and rhythm without murmur, gallop or rub. Breast:  xDeclined  Abdomen:  Soft, non-tender, normal bowel sounds. No bruits, organomegaly or masses. Extremities: Extremities warm to touch, pink, with no edema. Musculoskeletal:  negative  Peripheral Pulses:  Normal  Neurologic:  Gait normal. Reflexes normal and symmetric. Sensation grossly intact. Female Urogen:  xDeclined  Female Rectal: xDeclined        Diagnostics:  GYNECOLOGY ULTRASOUND REPORT                   [facility]   [address]; [address]   [address]   [phone] mn [phone] Fax           7/12/2022       MRN: [id number]   Contact Serial #: [id number]       Naty Terrazas   YOB: 1990   Age: 36 y.o. The ultrasound images were reviewed. Please see the attached ultrasound report. Ultrasonographer: Lavon Soulier [facility]       Assessment:   Naty Terrazas is a 36 y.o. female   Menorrhagia with irregular cycle   Dysmenorrhea, unspecified       Specific Ultrasound Imaging Obtained:   Transabdominal Approach: Yes   Transvaginal Approach: No       Limitations of Study Encountered requiring Trans Vaginal imaging:   Overlying bowel & gas limiting the study: No   Poor prep for procedure limiting study: No   Elevated BMI limiting study: No   Ovaries are NOT seen on Transabdominal imaging or a Retroverted uterus is present. No       Findings:   1. The Uterus is homogeneous and anteverted (8.42 x 6.37 x 4.73 cm)   2. The Endometrial Stripe measurement is 0.89 cm   3. The Left Ovary is without masses or cysts   4. The Right Ovary is without masses or cysts   5. There is not an abnormal amount of cul-de-sac fluid   6.  Small bilateral ovarian follicles                        Electronically signed by Junior Fisher DO on 7/12/22 at 6:40 PM EDT           Impression   1. The Uterus is homogeneous and anteverted (8.42 x 6.37 x 4.73 cm)   2. The Endometrial Stripe measurement is 0.89 cm   3. The Left Ovary is without masses or cysts   4. The Right Ovary is without masses or cysts   5. There is not an abnormal amount of cul-de-sac fluid   6. Small bilateral ovarian follicles        Lab Results:  Results for orders placed or performed during the hospital encounter of 09/23/22   SURGICAL PATHOLOGY REPORT   Result Value Ref Range    Surgical Pathology Report       -- Diagnosis --  ENDOMETRIUM, BIOPSY:       -  PROLIFERATIVE PHASE ENDOMETRIUM. -  NEGATIVE FOR HYPERPLASIA OR ATYPIA. Zen Reyes M.D.  **Electronically Signed Out**         jet/9/27/2022       Clinical Information  Pre-op Diagnosis:  DYSMENORRHEA    Operative Findings:  ENDOMETRIAL BX    Source of Specimen  A: ENDOMETRIUM BIOPSY    Gross Description  \"MARISOL CELESTE, EMB\" Multiple tan-brown tissue fragments, 2.0 x 1.8 x  0.2 cm in aggregate. Entirely 1cs. yr tm      Microscopic Description  Sections of proliferative phase endometrium show regularly spaced  glands without evidence of crowding, atypia or malignancy. SURGICAL PATHOLOGY CONSULTATION       Patient Name: Pavithra Osorio: [id number]  Path Number: [id number]    [facility] [address] [place], [address]  [phone]  Fax: [phone]             The patient was counseled at length about the risks of praful Covid-19 in the tl-operative and post-operative states including the recovery window of their procedure. The patient was made aware that praful Covid-19 after a surgical procedure may worsen their prognosis for recovering from the virus and lend to a higher morbidity and or mortality risk. The patient was given the options of postponing their procedure. All of the risks, benefits, and alternatives were discussed. The patient  wish to proceed with the procedure. The patient had the procedure discussed with her at length and in detail. The risks and benefits of concurrent ovarian cancer risk reducing bilateral salpingectomy with the patient's upcoming scheduled abdominal or pelvic surgery. This patient is at above average risk for development of ovarian cancer. See risk factors below:      Yes    No  Age greater than 29 yo   No  Elevated BMI/Obesity recalcitrant to interventions (pre-pregnancy >35)  No  Exposure to increased estrogen  No  A family history of Ovarian, Breast, Uterine or Colorectal cancer  No  Having a familial cancer syndrome (HBOC)  No  Having a positive genetic mutation predisposing the patient to Ovarian cancer risk elevation  No  History of in vitro/fertility treatments  No  Smoking recalcitrant to interventions  Yes  Medically indicated with Endometrial Ablation  No  Other personal risk factors not elsewhere specified  No  Hyperandrogenism signs   No             Diabetes Mellitus  No             Polycystic ovarian dz  No             Congenital Heart dz        I advised the patient that the primary benefit of such surgery is up to a 65% reduction in future risk of ovarian cancer. Finally, the patient has been thoroughly counseled regarding the consequence of loss of fertility following this procedure. The patient understands that this loss of fertility cannot be reversed and has expressed via verbal and written consent that her wishes are to proceed with this surgery for the purposes of reducing risk for ovarian cancer. Assessment:   Diagnosis Orders   1. Menorrhagia with irregular cycle        2.  Dysmenorrhea, unspecified            Patient Active Problem List    Diagnosis Date Noted    [facility]     Priority: High    Pelvic pain 2022     19 F Apg 8/9 Wt 7#10 2019    Depression Cannabis abuse - unable to determine 04/22/2016       PAP: negative  Permanent Sterilization Completed: No     Plan:  Breana with hysteroscopy with laparoscopic bilateral tubal occlusion vs removal      Counseling: The patient was counseled on all options both medical and surgical, conservative as well as definitive. She has elected to proceed with the procedure as stated above. The patient was counseled on the procedure. Risks and complications were reviewed in detail. The patients orders, labs, consents have been completed. The history and physical as well as all supporting surgical documentation will be forwarded to the pre-operative holding area. The patient is aware that this procedure may not alleviate her symptoms. That there may be a necessity for a second surgery and that there may be an incomplete removal of abnormal tissue. The patients that are undergoing a procedure for family planning WERE INSTRUCTED THAT THE PROCEDURE IS PERMANENT AND NON REVERSIBLE. FAILURE RATES OF 18-20/1000 CASES WERE REVIEWED AS WELL AS ALL ALTERNATE REVERSIBLE FORMS OF BIRTH CONTROL MALE AND FEMALE. THEY WERE COUNSELED THAT A FAILURE WOULD MOST LIKELY END UP IN AN ECTOPIC PREGNANCY, A PREGNANCY OUTSIDE OF THE UTERUS MOST COMMONLY IN THE FALLOPIAN TUBE, NECESSITATING FURTHER SURGERY, A BLOOD TRANSFUSION OR BOTH. POST TUBAL STERILIZATION SYNDROME WAS ALSO DISCUSSED WITH THE PATIENT AT LENGTH.              ________________________________________D. O.  Date:_______________  Ronny Dietz DO

## 2022-11-17 NOTE — PROGRESS NOTES
[address] [facility]  [facility],8[address]  [address]  [phone] mn [phone] Fax        Benito Fabian  1990  Primary Care Physician: No primary care provider on file. HISTORY AND PHYSICAL      Hospital: [facility]      2022  MEDICAID CONSENT COMPLETED: No      HPI: Benito Fabian is a 36 y.o. female   she is being seen for the problems listed below and is planning surgical intervention. She was counseled on all conservative medical and surgical options as well as definitive procedures as well. 1. Menorrhagia with irregular cycle    2. Dysmenorrhea, unspecified          Relevant Past History:   Patient Active Problem List    Diagnosis Date Noted    Mood disorder ([facility] [address] 2016     Priority: High    Pelvic pain 2022     21 F Apg 8/9 Wt 7#10 2019    Depression     Cannabis abuse - unable to determine 2016         OB History    Para Term  AB Living   3 3 3 0 0 3   SAB IAB Ectopic Molar Multiple Live Births   0 0 0 0 0 3      # Outcome Date GA Lbr Kevin/2nd Weight Sex Delivery Anes PTL Lv   3 Term 04/15/22 39w6d 00:25 / 00:16 8 lb 7.1 oz (3.83 kg) M Vag-Spont None N JOAN      Name: Adonis Grimaldo: 7  Apgar5: 8   2 Term 19 38w6d 02:07 / 00:05 7 lb 10.8 oz (3.48 kg) F Vag-Spont None N JOAN      Complications: Meconium at birth      Name: Joaquin Door: 8  Apgar5: 9   1 Term 2014 38w1d  6 lb 14 oz (3.118 kg) F Vag-Spont   JOAN       Past Medical History:   Diagnosis Date    Anemia     Depression     TSH deficiency 2019       History reviewed. No pertinent surgical history.     Social History     Socioeconomic History    Marital status: Single     Spouse name: Not on file    Number of children: Not on file    Years of education: Not on file    Highest education level: Not on file   Occupational History    Not on file   Tobacco Use    Smoking status: Never    Smokeless tobacco: Never   Vaping Use    Vaping Use: Never used   Substance and Sexual Activity    Alcohol use: Not Currently     Alcohol/week: 0.0 standard drinks     Comment: Social    Drug use: Not Currently     Types: Marijuana Becka Schwarz)     Comment: occ    Sexual activity: Yes     Partners: Male   Other Topics Concern    Not on file   Social History Narrative    Not on file     Social Determinants of Health     Financial Resource Strain: Not on file   Food Insecurity: Not on file   Transportation Needs: Not on file   Physical Activity: Not on file   Stress: Not on file   Social Connections: Not on file   Intimate Partner Violence: Not on file   Housing Stability: Not on file       Psychosocial History: denies    MEDICATIONS:  No current outpatient medications on file. No current facility-administered medications for this visit. ALLERGIES:  Allergies as of 11/17/2022    (No Known Allergies)           REVIEW OF SYSTEMS:      General appearance:Appears healthy. Alert; in no acute distress. Pleasant. HEENT: Unremarkable   CARDIOVASCULAR: Denies: chest pain, dyspnea on exertion, palpitations  RESPIRATORY: Denies: cough, shortness of breath, wheezing  GI: Denies: abdominal pain, flank pain, nausea, vomiting, diarrhea  : Denies: dysuria, frequency/urgency, hematuria, pelvic pain  MUSCULOSKELETAL: Denies: back pain, joint swelling, muscle pain  SKIN: Denies: rash, hives. HEMATOLOGIC: Denies Bleeding Disorder, Coagulopathy or Transfusion  NEUROLOGIC: Denies Migraines, Headaches, CVA, TIA  ENDOCRINE: Denies any Endocrinologic disorders                PHYSICAL EXAM:  Blood pressure 116/72, height 5' (1.524 m), weight 175 lb (79.4 kg), last menstrual period 11/04/2022, currently breastfeeding.       General Appearance:  awake, alert, oriented, in no acute distress, well developed, well nourished, in no acute distress   Skin:  Skin color, texture, turgor normal. No rashes or lesions  Mouth/Throat: Mucosa moist.  No lesions. Pharynx without erythema, edema or exudate. Lungs:  Normal expansion. Clear to auscultation. No rales, rhonchi, or wheezing. Heart:  Heart sounds are normal.  Regular rate and rhythm without murmur, gallop or rub. Breast:  xDeclined  Abdomen:  Soft, non-tender, normal bowel sounds. No bruits, organomegaly or masses. Extremities: Extremities warm to touch, pink, with no edema. Musculoskeletal:  negative  Peripheral Pulses:  Normal  Neurologic:  Gait normal. Reflexes normal and symmetric. Sensation grossly intact. Female Urogen:  xDeclined  Female Rectal: xDeclined        Diagnostics:  GYNECOLOGY ULTRASOUND REPORT                   [facility]   [facility]; [address]   [address]   [phone] mn [phone] Fax           7/12/2022       MRN: [id number]   Contact Serial #: [id number]       Benito Fabian   YOB: 1990   Age: 36 y.o. The ultrasound images were reviewed. Please see the attached ultrasound report. Ultrasonographer: Kael Monaco RDMS       Assessment:   Benito Fabian is a 36 y.o. female   Menorrhagia with irregular cycle   Dysmenorrhea, unspecified       Specific Ultrasound Imaging Obtained:   Transabdominal Approach: Yes   Transvaginal Approach: No       Limitations of Study Encountered requiring Trans Vaginal imaging:   Overlying bowel & gas limiting the study: No   Poor prep for procedure limiting study: No   Elevated BMI limiting study: No   Ovaries are NOT seen on Transabdominal imaging or a Retroverted uterus is present. No       Findings:   1. The Uterus is homogeneous and anteverted (8.42 x 6.37 x 4.73 cm)   2. The Endometrial Stripe measurement is 0.89 cm   3. The Left Ovary is without masses or cysts   4. The Right Ovary is without masses or cysts   5. There is not an abnormal amount of cul-de-sac fluid   6.  Small bilateral ovarian follicles                        Electronically signed by Gallito Blandon DO on 7/12/22 at 6:40 PM EDT           Impression   1. The Uterus is homogeneous and anteverted (8.42 x 6.37 x 4.73 cm)   2. The Endometrial Stripe measurement is 0.89 cm   3. The Left Ovary is without masses or cysts   4. The Right Ovary is without masses or cysts   5. There is not an abnormal amount of cul-de-sac fluid   6. Small bilateral ovarian follicles        Lab Results:  Results for orders placed or performed during the hospital encounter of 09/23/22   SURGICAL PATHOLOGY REPORT   Result Value Ref Range    Surgical Pathology Report       -- Diagnosis --  ENDOMETRIUM, BIOPSY:       -  PROLIFERATIVE PHASE ENDOMETRIUM. -  NEGATIVE FOR HYPERPLASIA OR ATYPIA. Richard Fabian M.D.  **Electronically Signed Out**         jet/9/27/2022       Clinical Information  Pre-op Diagnosis:  DYSMENORRHEA    Operative Findings:  ENDOMETRIAL BX    Source of Specimen  A: ENDOMETRIUM BIOPSY    Gross Description  \"MARISOL CELESTE, EMB\" Multiple tan-brown tissue fragments, 2.0 x 1.8 x  0.2 cm in aggregate. Entirely 1cs. yr tm      Microscopic Description  Sections of proliferative phase endometrium show regularly spaced  glands without evidence of crowding, atypia or malignancy. SURGICAL PATHOLOGY CONSULTATION       Patient Name: Leandra Garibay: [id number]  Path Number: [id number]    [facility].. [facility], [address]  [phone]  Fax: [phone]             The patient was counseled at length about the risks of praful Covid-19 in the tl-operative and post-operative states including the recovery window of their procedure. The patient was made aware that praful Covid-19 after a surgical procedure may worsen their prognosis for recovering from the virus and lend to a higher morbidity and or mortality risk. The patient was given the options of postponing their procedure. All of the risks, benefits, and alternatives were discussed. The patient  wish to proceed with the procedure. The patient had the procedure discussed with her at length and in detail. The risks and benefits of concurrent ovarian cancer risk reducing bilateral salpingectomy with the patient's upcoming scheduled abdominal or pelvic surgery. This patient is at above average risk for development of ovarian cancer. See risk factors below:      Yes    No  Age greater than 29 yo   No  Elevated BMI/Obesity recalcitrant to interventions (pre-pregnancy >35)  No  Exposure to increased estrogen  No  A family history of Ovarian, Breast, Uterine or Colorectal cancer  No  Having a familial cancer syndrome (HBOC)  No  Having a positive genetic mutation predisposing the patient to Ovarian cancer risk elevation  No  History of in vitro/fertility treatments  No  Smoking recalcitrant to interventions  Yes  Medically indicated with Endometrial Ablation  No  Other personal risk factors not elsewhere specified  No  Hyperandrogenism signs   No             Diabetes Mellitus  No             Polycystic ovarian dz  No             Congenital Heart dz        I advised the patient that the primary benefit of such surgery is up to a 65% reduction in future risk of ovarian cancer. Finally, the patient has been thoroughly counseled regarding the consequence of loss of fertility following this procedure. The patient understands that this loss of fertility cannot be reversed and has expressed via verbal and written consent that her wishes are to proceed with this surgery for the purposes of reducing risk for ovarian cancer. Assessment:   Diagnosis Orders   1. Menorrhagia with irregular cycle        2.  Dysmenorrhea, unspecified            Patient Active Problem List    Diagnosis Date Noted    [facility]     Priority: High    Pelvic pain 2022     19 F Apg 8/9 Wt 7#10 2019    Depression Cannabis abuse - unable to determine 04/22/2016       PAP: negative  Permanent Sterilization Completed: No     Plan:  Breana with hysteroscopy with laparoscopic bilateral tubal occlusion vs removal      Counseling: The patient was counseled on all options both medical and surgical, conservative as well as definitive. She has elected to proceed with the procedure as stated above. The patient was counseled on the procedure. Risks and complications were reviewed in detail. The patients orders, labs, consents have been completed. The history and physical as well as all supporting surgical documentation will be forwarded to the pre-operative holding area. The patient is aware that this procedure may not alleviate her symptoms. That there may be a necessity for a second surgery and that there may be an incomplete removal of abnormal tissue. The patients that are undergoing a procedure for family planning WERE INSTRUCTED THAT THE PROCEDURE IS PERMANENT AND NON REVERSIBLE. FAILURE RATES OF 18-20/1000 CASES WERE REVIEWED AS WELL AS ALL ALTERNATE REVERSIBLE FORMS OF BIRTH CONTROL MALE AND FEMALE. THEY WERE COUNSELED THAT A FAILURE WOULD MOST LIKELY END UP IN AN ECTOPIC PREGNANCY, A PREGNANCY OUTSIDE OF THE UTERUS MOST COMMONLY IN THE FALLOPIAN TUBE, NECESSITATING FURTHER SURGERY, A BLOOD TRANSFUSION OR BOTH. POST TUBAL STERILIZATION SYNDROME WAS ALSO DISCUSSED WITH THE PATIENT AT LENGTH.              ________________________________________D. O. Date:_______________  [facility],           The patient, Rosmery Lyon is a 36 y.o. female, was seen with a total time spent of 20 minutes for the visit on this date of service by the E/M provider. The time component had both face to face and non face to face time spent in determining the total time component.   Counseling and education regarding her diagnosis listed below and her options regarding those diagnoses were also included in determining her time component. Diagnosis Orders   1. Menorrhagia with irregular cycle        2. Dysmenorrhea, unspecified             The patient had her preventative health maintenance recommendations and follow-up reviewed with her at the completion of her visit.

## 2022-11-17 NOTE — H&P (VIEW-ONLY)
[address] [facility]  [facility]; [address] [facility],[address]  [address]  [phone] mn [phone] Fax        Dipesh Durand  1990  Primary Care Physician: No primary care provider on file. HISTORY AND PHYSICAL      Hospital: [facility]      2022  MEDICAID CONSENT COMPLETED: No      HPI: Dipesh Durand is a 36 y.o. female   she is being seen for the problems listed below and is planning surgical intervention. She was counseled on all conservative medical and surgical options as well as definitive procedures as well. 1. Menorrhagia with irregular cycle    2. Dysmenorrhea, unspecified          Relevant Past History:   Patient Active Problem List    Diagnosis Date Noted    Mood disorder ([facility] [address] 2016     Priority: High    Pelvic pain 2022     19 F Apg 8/9 Wt 7#10 2019    Depression     Cannabis abuse - unable to determine 2016         OB History    Para Term  AB Living   3 3 3 0 0 3   SAB IAB Ectopic Molar Multiple Live Births   0 0 0 0 0 3      # Outcome Date GA Lbr Kevin/2nd Weight Sex Delivery Anes PTL Lv   3 Term 04/15/22 39w6d 00:25 / 00:16 8 lb 7.1 oz (3.83 kg) M Vag-Spont None N JOAN      Name: Yvette Butts: 7  Apgar5: 8   2 Term 19 38w6d 02:07 / 00:05 7 lb 10.8 oz (3.48 kg) F Vag-Spont None N JOAN      Complications: Meconium at birth      Name: Landry Meckel: 8  Apgar5: 9   1 Term 2014 38w1d  6 lb 14 oz (3.118 kg) F Vag-Spont   JOAN       Past Medical History:   Diagnosis Date    Anemia     Depression     TSH deficiency 2019       History reviewed. No pertinent surgical history.     Social History     Socioeconomic History    Marital status: Single     Spouse name: Not on file    Number of children: Not on file    Years of education: Not on file    Highest education level: Not on file   Occupational History    Not on file   Tobacco Use    Smoking status: Never    Smokeless tobacco: Never   Vaping Use    Vaping Use: Never used   Substance and Sexual Activity    Alcohol use: Not Currently     Alcohol/week: 0.0 standard drinks     Comment: Social    Drug use: Not Currently     Types: Marijuana Pattie Bloom)     Comment: occ    Sexual activity: Yes     Partners: Male   Other Topics Concern    Not on file   Social History Narrative    Not on file     Social Determinants of Health     Financial Resource Strain: Not on file   Food Insecurity: Not on file   Transportation Needs: Not on file   Physical Activity: Not on file   Stress: Not on file   Social Connections: Not on file   Intimate Partner Violence: Not on file   Housing Stability: Not on file       Psychosocial History: denies    MEDICATIONS:  No current outpatient medications on file. No current facility-administered medications for this visit. ALLERGIES:  Allergies as of 11/17/2022    (No Known Allergies)           REVIEW OF SYSTEMS:      General appearance:Appears healthy. Alert; in no acute distress. Pleasant. HEENT: Unremarkable   CARDIOVASCULAR: Denies: chest pain, dyspnea on exertion, palpitations  RESPIRATORY: Denies: cough, shortness of breath, wheezing  GI: Denies: abdominal pain, flank pain, nausea, vomiting, diarrhea  : Denies: dysuria, frequency/urgency, hematuria, pelvic pain  MUSCULOSKELETAL: Denies: back pain, joint swelling, muscle pain  SKIN: Denies: rash, hives. HEMATOLOGIC: Denies Bleeding Disorder, Coagulopathy or Transfusion  NEUROLOGIC: Denies Migraines, Headaches, CVA, TIA  ENDOCRINE: Denies any Endocrinologic disorders                PHYSICAL EXAM:  Blood pressure 116/72, height 5' (1.524 m), weight 175 lb (79.4 kg), last menstrual period 11/04/2022, currently breastfeeding.       General Appearance:  awake, alert, oriented, in no acute distress, well developed, well nourished, in no acute distress   Skin:  Skin color, texture, turgor normal. No rashes or lesions  Mouth/Throat: Mucosa moist.  No lesions. Pharynx without erythema, edema or exudate. Lungs:  Normal expansion. Clear to auscultation. No rales, rhonchi, or wheezing. Heart:  Heart sounds are normal.  Regular rate and rhythm without murmur, gallop or rub. Breast:  xDeclined  Abdomen:  Soft, non-tender, normal bowel sounds. No bruits, organomegaly or masses. Extremities: Extremities warm to touch, pink, with no edema. Musculoskeletal:  negative  Peripheral Pulses:  Normal  Neurologic:  Gait normal. Reflexes normal and symmetric. Sensation grossly intact. Female Urogen:  xDeclined  Female Rectal: xDeclined        Diagnostics:  GYNECOLOGY ULTRASOUND REPORT                   [address] [facility]ne[facility]   [place] [address]; [address]   [address]   [phone] mn [phone] Fax           7/12/2022       MRN: [id number]   Contact Serial #: [id number]       Eleanor Trent   YOB: 1990   Age: 36 y.o. The ultrasound images were reviewed. Please see the attached ultrasound report. Ultrasonographer: Herminia Franklin RDMS       Assessment:   Eleanor Trent is a 36 y.o. female   Menorrhagia with irregular cycle   Dysmenorrhea, unspecified       Specific Ultrasound Imaging Obtained:   Transabdominal Approach: Yes   Transvaginal Approach: No       Limitations of Study Encountered requiring Trans Vaginal imaging:   Overlying bowel & gas limiting the study: No   Poor prep for procedure limiting study: No   Elevated BMI limiting study: No   Ovaries are NOT seen on Transabdominal imaging or a Retroverted uterus is present. No       Findings:   1. The Uterus is homogeneous and anteverted (8.42 x 6.37 x 4.73 cm)   2. The Endometrial Stripe measurement is 0.89 cm   3. The Left Ovary is without masses or cysts   4. The Right Ovary is without masses or cysts   5. There is not an abnormal amount of cul-de-sac fluid   6.  Small bilateral ovarian follicles                        Electronically signed by Kirtland Schirmer, DO on 7/12/22 at 6:40 PM EDT           Impression   1. The Uterus is homogeneous and anteverted (8.42 x 6.37 x 4.73 cm)   2. The Endometrial Stripe measurement is 0.89 cm   3. The Left Ovary is without masses or cysts   4. The Right Ovary is without masses or cysts   5. There is not an abnormal amount of cul-de-sac fluid   6. Small bilateral ovarian follicles        Lab Results:  Results for orders placed or performed during the hospital encounter of 09/23/22   SURGICAL PATHOLOGY REPORT   Result Value Ref Range    Surgical Pathology Report       -- Diagnosis --  ENDOMETRIUM, BIOPSY:       -  PROLIFERATIVE PHASE ENDOMETRIUM. -  NEGATIVE FOR HYPERPLASIA OR ATYPIA. Balbir Carter M.D.  **Electronically Signed Out**         jet/9/27/2022       Clinical Information  Pre-op Diagnosis:  DYSMENORRHEA    Operative Findings:  ENDOMETRIAL BX    Source of Specimen  A: ENDOMETRIUM BIOPSY    Gross Description  \"MARISOL CELESTE, EMB\" Multiple tan-brown tissue fragments, 2.0 x 1.8 x  0.2 cm in aggregate. Entirely 1cs. yr tm      Microscopic Description  Sections of proliferative phase endometrium show regularly spaced  glands without evidence of crowding, atypia or malignancy. SURGICAL PATHOLOGY CONSULTATION       Patient Name: Kesha Chase: [id number]  Path Number: [id number]    [facility] 97.. [facility], [address]  [phone]  Fax: [phone]             The patient was counseled at length about the risks of praful Covid-19 in the tl-operative and post-operative states including the recovery window of their procedure. The patient was made aware that praful Covid-19 after a surgical procedure may worsen their prognosis for recovering from the virus and lend to a higher morbidity and or mortality risk. The patient was given the options of postponing their procedure. All of the risks, benefits, and alternatives were discussed. The patient  wish to proceed with the procedure. The patient had the procedure discussed with her at length and in detail. The risks and benefits of concurrent ovarian cancer risk reducing bilateral salpingectomy with the patient's upcoming scheduled abdominal or pelvic surgery. This patient is at above average risk for development of ovarian cancer. See risk factors below:      Yes    No  Age greater than 27 yo   No  Elevated BMI/Obesity recalcitrant to interventions (pre-pregnancy >35)  No  Exposure to increased estrogen  No  A family history of Ovarian, Breast, Uterine or Colorectal cancer  No  Having a familial cancer syndrome (HBOC)  No  Having a positive genetic mutation predisposing the patient to Ovarian cancer risk elevation  No  History of in vitro/fertility treatments  No  Smoking recalcitrant to interventions  Yes  Medically indicated with Endometrial Ablation  No  Other personal risk factors not elsewhere specified  No  Hyperandrogenism signs   No             Diabetes Mellitus  No             Polycystic ovarian dz  No             Congenital Heart dz        I advised the patient that the primary benefit of such surgery is up to a 65% reduction in future risk of ovarian cancer. Finally, the patient has been thoroughly counseled regarding the consequence of loss of fertility following this procedure. The patient understands that this loss of fertility cannot be reversed and has expressed via verbal and written consent that her wishes are to proceed with this surgery for the purposes of reducing risk for ovarian cancer. Assessment:   Diagnosis Orders   1. Menorrhagia with irregular cycle        2.  Dysmenorrhea, unspecified            Patient Active Problem List    Diagnosis Date Noted    [facility]     Priority: High    Pelvic pain 2022     19 F Apg 8/9 Wt 7#10 2019    Depression Cannabis abuse - unable to determine 04/22/2016       PAP: negative  Permanent Sterilization Completed: No     Plan:  Breana with hysteroscopy with laparoscopic bilateral tubal occlusion vs removal      Counseling: The patient was counseled on all options both medical and surgical, conservative as well as definitive. She has elected to proceed with the procedure as stated above. The patient was counseled on the procedure. Risks and complications were reviewed in detail. The patients orders, labs, consents have been completed. The history and physical as well as all supporting surgical documentation will be forwarded to the pre-operative holding area. The patient is aware that this procedure may not alleviate her symptoms. That there may be a necessity for a second surgery and that there may be an incomplete removal of abnormal tissue. The patients that are undergoing a procedure for family planning WERE INSTRUCTED THAT THE PROCEDURE IS PERMANENT AND NON REVERSIBLE. FAILURE RATES OF 18-20/1000 CASES WERE REVIEWED AS WELL AS ALL ALTERNATE REVERSIBLE FORMS OF BIRTH CONTROL MALE AND FEMALE. THEY WERE COUNSELED THAT A FAILURE WOULD MOST LIKELY END UP IN AN ECTOPIC PREGNANCY, A PREGNANCY OUTSIDE OF THE UTERUS MOST COMMONLY IN THE FALLOPIAN TUBE, NECESSITATING FURTHER SURGERY, A BLOOD TRANSFUSION OR BOTH. POST TUBAL STERILIZATION SYNDROME WAS ALSO DISCUSSED WITH THE PATIENT AT LENGTH.              ________________________________________D. O.  Date:_______________  Gaetano Chan DO

## 2022-11-21 ENCOUNTER — HOSPITAL ENCOUNTER (OUTPATIENT)
Dept: PREADMISSION TESTING | Age: 32
Discharge: HOME | End: 2022-11-25
Attending: OBSTETRICS & GYNECOLOGY | Admitting: OBSTETRICS & GYNECOLOGY
Payer: COMMERCIAL

## 2022-11-21 VITALS
HEIGHT: 63 IN | SYSTOLIC BLOOD PRESSURE: 112 MMHG | OXYGEN SATURATION: 100 % | RESPIRATION RATE: 16 BRPM | DIASTOLIC BLOOD PRESSURE: 53 MMHG | HEART RATE: 70 BPM | TEMPERATURE: 98 F | WEIGHT: 165 LBS | BODY MASS INDEX: 29.23 KG/M2

## 2022-11-21 DIAGNOSIS — Z01.818 PREOP TESTING: ICD-10-CM

## 2022-11-21 DIAGNOSIS — N92.1 MENORRHAGIA WITH IRREGULAR CYCLE: ICD-10-CM

## 2022-11-21 DIAGNOSIS — N94.6 DYSMENORRHEA, UNSPECIFIED: ICD-10-CM

## 2022-11-21 LAB
ABO/RH: NORMAL
ABSOLUTE EOS #: 0.1 K/UL (ref 0–0.4)
ABSOLUTE LYMPH #: 2 K/UL (ref 1–4.8)
ABSOLUTE MONO #: 0.6 K/UL (ref 0.1–1.3)
ANION GAP SERPL CALCULATED.3IONS-SCNC: 11 MMOL/L (ref 9–17)
ANTIBODY SCREEN: NEGATIVE
ARM BAND NUMBER: NORMAL
BACTERIA: ABNORMAL
BASOPHILS # BLD: 0 % (ref 0–2)
BASOPHILS ABSOLUTE: 0 K/UL (ref 0–0.2)
BILIRUBIN URINE: NEGATIVE
BUN BLDV-MCNC: 9 MG/DL (ref 6–20)
CALCIUM SERPL-MCNC: 9.1 MG/DL (ref 8.6–10.4)
CASTS UA: ABNORMAL /LPF
CHLORIDE BLD-SCNC: 100 MMOL/L (ref 98–107)
CO2: 25 MMOL/L (ref 20–31)
COLOR: YELLOW
CREAT SERPL-MCNC: 0.66 MG/DL (ref 0.5–0.9)
EOSINOPHILS RELATIVE PERCENT: 2 % (ref 0–4)
EPITHELIAL CELLS UA: ABNORMAL /HPF
EXPIRATION DATE: NORMAL
GFR SERPL CREATININE-BSD FRML MDRD: >60 ML/MIN/1.73M2
GLUCOSE BLD-MCNC: 95 MG/DL (ref 70–99)
GLUCOSE URINE: NEGATIVE
HCG QUANTITATIVE: <1 MIU/ML
HCT VFR BLD CALC: 39.4 % (ref 36–46)
HEMOGLOBIN: 13.2 G/DL (ref 12–16)
INR BLD: 1
KETONES, URINE: ABNORMAL
LEUKOCYTE ESTERASE, URINE: NEGATIVE
LYMPHOCYTES # BLD: 24 % (ref 24–44)
MCH RBC QN AUTO: 28.6 PG (ref 26–34)
MCHC RBC AUTO-ENTMCNC: 33.5 G/DL (ref 31–37)
MCV RBC AUTO: 85.4 FL (ref 80–100)
MONOCYTES # BLD: 7 % (ref 1–7)
NITRITE, URINE: NEGATIVE
PARTIAL THROMBOPLASTIN TIME: 33.1 SEC (ref 24–36)
PDW BLD-RTO: 14.7 % (ref 11.5–14.9)
PH UA: 5 (ref 5–8)
PLATELET # BLD: 420 K/UL (ref 150–450)
PMV BLD AUTO: 7.7 FL (ref 6–12)
POTASSIUM SERPL-SCNC: 3.8 MMOL/L (ref 3.7–5.3)
PROTEIN UA: ABNORMAL
PROTHROMBIN TIME: 12.9 SEC (ref 11.8–14.6)
RBC # BLD: 4.61 M/UL (ref 4–5.2)
RBC UA: ABNORMAL /HPF
SEG NEUTROPHILS: 67 % (ref 36–66)
SEGMENTED NEUTROPHILS ABSOLUTE COUNT: 5.7 K/UL (ref 1.3–9.1)
SODIUM BLD-SCNC: 136 MMOL/L (ref 135–144)
SPECIFIC GRAVITY UA: 1.03 (ref 1–1.03)
TURBIDITY: ABNORMAL
URINE HGB: ABNORMAL
UROBILINOGEN, URINE: NORMAL
WBC # BLD: 8.4 K/UL (ref 3.5–11)
WBC UA: ABNORMAL /HPF

## 2022-11-21 PROCEDURE — 85730 THROMBOPLASTIN TIME PARTIAL: CPT

## 2022-11-21 PROCEDURE — 86900 BLOOD TYPING SEROLOGIC ABO: CPT

## 2022-11-21 PROCEDURE — 85610 PROTHROMBIN TIME: CPT

## 2022-11-21 PROCEDURE — 93005 ELECTROCARDIOGRAM TRACING: CPT | Performed by: ANESTHESIOLOGY

## 2022-11-21 PROCEDURE — 36415 COLL VENOUS BLD VENIPUNCTURE: CPT

## 2022-11-21 PROCEDURE — 86901 BLOOD TYPING SEROLOGIC RH(D): CPT

## 2022-11-21 PROCEDURE — 86850 RBC ANTIBODY SCREEN: CPT

## 2022-11-21 PROCEDURE — 85025 COMPLETE CBC W/AUTO DIFF WBC: CPT

## 2022-11-21 PROCEDURE — 80048 BASIC METABOLIC PNL TOTAL CA: CPT

## 2022-11-21 PROCEDURE — 84702 CHORIONIC GONADOTROPIN TEST: CPT

## 2022-11-21 PROCEDURE — 81001 URINALYSIS AUTO W/SCOPE: CPT

## 2022-11-21 PROCEDURE — 87086 URINE CULTURE/COLONY COUNT: CPT

## 2022-11-21 NOTE — DISCHARGE INSTRUCTIONS
Pre-op Instructions For Out-Patient Surgery    Medication Instructions:  Please stop herbs and any supplements now (includes vitamins and minerals). Please contact your surgeon and prescribing physician for pre-op instructions for any blood thinners. If you have inhalers/aerosol treatments at home, please use them the morning of your surgery and bring the inhalers with you to the hospital.    Please take the following medications the morning of your surgery with a sip of water:    None    Surgery Instructions:  After midnight before surgery:  Do not eat or drink anything, including water, mints, gum, and hard candy. You may brush your teeth without swallowing. No smoking, chewing tobacco, or street drugs. Please shower or bathe before surgery. If you were given Surgical Scrub Chlorhexidine Gluconate Liquid (CHG), please shower the night before and the morning of your surgery following the detailed instructions you received during your pre-admission visit. Please do not wear any cologne, lotion, powder, deodorant, jewelry, piercings, perfume, makeup, nail polish, hair accessories, or hair spray on the day of surgery. Wear loose comfortable clothing. Leave your valuables at home. Bring a storage case for any glasses/contacts. An adult who is responsible for you MUST drive you home and should be with you for the first 24 hours after surgery. If having out-patient knee and foot surgeries, please arrange for planned crutches, walker, or wheelchair before arriving to the hospital.    The Day of Surgery:  Arrive at [facility] AT [facility] at the time directed by your surgeon and check in at the desk. If you have a living will or healthcare power of , please bring a copy. You will be taken to the pre-op holding area where you will be prepared for surgery. A physical assessment will be performed by a nurse practitioner or house officer. Your IV will be started and you will meet your anesthesiologist.    When you go to surgery, your family will be directed to the surgical waiting room, where the doctor should speak with them after your surgery. After surgery, you will be taken to the recovery room then when you are awake and stable you will go to the short stay unit for preparation to be discharged. If you use a Bi-PAP or C-PAP machine, please bring it with you and leave it in the car in case it is needed in recovery room.

## 2022-11-22 LAB
CULTURE: NORMAL
EKG ATRIAL RATE: 56 BPM
EKG P AXIS: 63 DEGREES
EKG P-R INTERVAL: 114 MS
EKG Q-T INTERVAL: 428 MS
EKG QRS DURATION: 72 MS
EKG QTC CALCULATION (BAZETT): 413 MS
EKG R AXIS: 63 DEGREES
EKG T AXIS: 30 DEGREES
EKG VENTRICULAR RATE: 56 BPM
SPECIMEN DESCRIPTION: NORMAL

## 2022-11-22 PROCEDURE — 93010 ELECTROCARDIOGRAM REPORT: CPT | Performed by: INTERNAL MEDICINE

## 2022-12-02 ENCOUNTER — ANESTHESIA EVENT (OUTPATIENT)
Dept: OPERATING ROOM | Age: 32
End: 2022-12-02
Payer: COMMERCIAL

## 2022-12-02 NOTE — PRE-PROCEDURE INSTRUCTIONS
No answer, left message ? -YES                             Unable to leave message ? When were you told to arrive at hospital ?      Do you have a  ? Are you on any blood thinners ? If yes when did you stop taking ? Do you have your prep Rx filled and instruction ? Nothing to eat the day before , only clear liquids. Are you experiencing any covid symptoms ? Do you have any infections or rash we should be aware of ? Do you have the Hibiclens soap to use the night before and the morning of surgery ? Nothing to eat or drink after midnight, only a sip of water to take any medication instructed to take the night before. Wear comfortable clothing, leave any valuables at home, remove any jewelry and body piercing .

## 2022-12-05 ENCOUNTER — HOSPITAL ENCOUNTER (OUTPATIENT)
Age: 32
Setting detail: OUTPATIENT SURGERY
Discharge: HOME | End: 2022-12-05
Attending: OBSTETRICS & GYNECOLOGY | Admitting: OBSTETRICS & GYNECOLOGY
Payer: COMMERCIAL

## 2022-12-05 ENCOUNTER — ANESTHESIA (OUTPATIENT)
Dept: OPERATING ROOM | Age: 32
End: 2022-12-05
Payer: COMMERCIAL

## 2022-12-05 VITALS
RESPIRATION RATE: 16 BRPM | HEART RATE: 59 BPM | BODY MASS INDEX: 29.23 KG/M2 | TEMPERATURE: 97 F | SYSTOLIC BLOOD PRESSURE: 114 MMHG | OXYGEN SATURATION: 97 % | WEIGHT: 165 LBS | HEIGHT: 63 IN | DIASTOLIC BLOOD PRESSURE: 61 MMHG

## 2022-12-05 DIAGNOSIS — G89.18 POSTOPERATIVE PAIN: Primary | ICD-10-CM

## 2022-12-05 DIAGNOSIS — N92.0 MENORRHAGIA WITH REGULAR CYCLE: ICD-10-CM

## 2022-12-05 DIAGNOSIS — N94.6 DYSMENORRHEA: ICD-10-CM

## 2022-12-05 PROBLEM — Z98.890 S/P ENDOMETRIAL ABLATION: Status: ACTIVE | Noted: 2022-12-05

## 2022-12-05 LAB — HCG, PREGNANCY URINE (POC): NEGATIVE

## 2022-12-05 PROCEDURE — 2500000003 HC RX 250 WO HCPCS: Performed by: OBSTETRICS & GYNECOLOGY

## 2022-12-05 PROCEDURE — 7100000000 HC PACU RECOVERY - FIRST 15 MIN: Performed by: OBSTETRICS & GYNECOLOGY

## 2022-12-05 PROCEDURE — 6360000002 HC RX W HCPCS

## 2022-12-05 PROCEDURE — 7100000010 HC PHASE II RECOVERY - FIRST 15 MIN: Performed by: OBSTETRICS & GYNECOLOGY

## 2022-12-05 PROCEDURE — 81025 URINE PREGNANCY TEST: CPT

## 2022-12-05 PROCEDURE — 2500000003 HC RX 250 WO HCPCS

## 2022-12-05 PROCEDURE — 6370000000 HC RX 637 (ALT 250 FOR IP): Performed by: ANESTHESIOLOGY

## 2022-12-05 PROCEDURE — 2720000010 HC SURG SUPPLY STERILE: Performed by: OBSTETRICS & GYNECOLOGY

## 2022-12-05 PROCEDURE — 3700000001 HC ADD 15 MINUTES (ANESTHESIA): Performed by: OBSTETRICS & GYNECOLOGY

## 2022-12-05 PROCEDURE — 88305 TISSUE EXAM BY PATHOLOGIST: CPT

## 2022-12-05 PROCEDURE — 3700000000 HC ANESTHESIA ATTENDED CARE: Performed by: OBSTETRICS & GYNECOLOGY

## 2022-12-05 PROCEDURE — 7100000001 HC PACU RECOVERY - ADDTL 15 MIN: Performed by: OBSTETRICS & GYNECOLOGY

## 2022-12-05 PROCEDURE — 2709999900 HC NON-CHARGEABLE SUPPLY: Performed by: OBSTETRICS & GYNECOLOGY

## 2022-12-05 PROCEDURE — 7100000030 HC ASPR PHASE II RECOVERY - FIRST 15 MIN: Performed by: OBSTETRICS & GYNECOLOGY

## 2022-12-05 PROCEDURE — 7100000011 HC PHASE II RECOVERY - ADDTL 15 MIN: Performed by: OBSTETRICS & GYNECOLOGY

## 2022-12-05 PROCEDURE — 6360000002 HC RX W HCPCS: Performed by: ANESTHESIOLOGY

## 2022-12-05 PROCEDURE — 7100000031 HC ASPR PHASE II RECOVERY - ADDTL 15 MIN: Performed by: OBSTETRICS & GYNECOLOGY

## 2022-12-05 PROCEDURE — 3600000013 HC SURGERY LEVEL 3 ADDTL 15MIN: Performed by: OBSTETRICS & GYNECOLOGY

## 2022-12-05 PROCEDURE — 3600000003 HC SURGERY LEVEL 3 BASE: Performed by: OBSTETRICS & GYNECOLOGY

## 2022-12-05 PROCEDURE — 2580000003 HC RX 258: Performed by: ANESTHESIOLOGY

## 2022-12-05 RX ORDER — ONDANSETRON 2 MG/ML
4 INJECTION INTRAMUSCULAR; INTRAVENOUS
Status: DISCONTINUED | OUTPATIENT
Start: 2022-12-05 | End: 2022-12-05 | Stop reason: HOSPADM

## 2022-12-05 RX ORDER — GLYCOPYRROLATE 0.2 MG/ML
INJECTION INTRAMUSCULAR; INTRAVENOUS PRN
Status: DISCONTINUED | OUTPATIENT
Start: 2022-12-05 | End: 2022-12-05 | Stop reason: SDUPTHER

## 2022-12-05 RX ORDER — SODIUM CHLORIDE 0.9 % (FLUSH) 0.9 %
5-40 SYRINGE (ML) INJECTION PRN
Status: DISCONTINUED | OUTPATIENT
Start: 2022-12-05 | End: 2022-12-05 | Stop reason: HOSPADM

## 2022-12-05 RX ORDER — ACETAMINOPHEN 500 MG
1000 TABLET ORAL ONCE
Status: COMPLETED | OUTPATIENT
Start: 2022-12-05 | End: 2022-12-05

## 2022-12-05 RX ORDER — LIDOCAINE HYDROCHLORIDE 10 MG/ML
INJECTION, SOLUTION EPIDURAL; INFILTRATION; INTRACAUDAL; PERINEURAL PRN
Status: DISCONTINUED | OUTPATIENT
Start: 2022-12-05 | End: 2022-12-05 | Stop reason: SDUPTHER

## 2022-12-05 RX ORDER — SODIUM CHLORIDE 0.9 % (FLUSH) 0.9 %
5-40 SYRINGE (ML) INJECTION EVERY 12 HOURS SCHEDULED
Status: DISCONTINUED | OUTPATIENT
Start: 2022-12-05 | End: 2022-12-05 | Stop reason: HOSPADM

## 2022-12-05 RX ORDER — BUPIVACAINE HYDROCHLORIDE 5 MG/ML
INJECTION, SOLUTION EPIDURAL; INTRACAUDAL PRN
Status: DISCONTINUED | OUTPATIENT
Start: 2022-12-05 | End: 2022-12-05 | Stop reason: ALTCHOICE

## 2022-12-05 RX ORDER — DEXAMETHASONE SODIUM PHOSPHATE 4 MG/ML
INJECTION, SOLUTION INTRA-ARTICULAR; INTRALESIONAL; INTRAMUSCULAR; INTRAVENOUS; SOFT TISSUE PRN
Status: DISCONTINUED | OUTPATIENT
Start: 2022-12-05 | End: 2022-12-05 | Stop reason: SDUPTHER

## 2022-12-05 RX ORDER — ONDANSETRON 2 MG/ML
INJECTION INTRAMUSCULAR; INTRAVENOUS PRN
Status: DISCONTINUED | OUTPATIENT
Start: 2022-12-05 | End: 2022-12-05 | Stop reason: SDUPTHER

## 2022-12-05 RX ORDER — GABAPENTIN 300 MG/1
300 CAPSULE ORAL ONCE
Status: COMPLETED | OUTPATIENT
Start: 2022-12-05 | End: 2022-12-05

## 2022-12-05 RX ORDER — SODIUM CHLORIDE 9 MG/ML
INJECTION, SOLUTION INTRAVENOUS PRN
Status: DISCONTINUED | OUTPATIENT
Start: 2022-12-05 | End: 2022-12-05 | Stop reason: HOSPADM

## 2022-12-05 RX ORDER — ACETAMINOPHEN 500 MG
1000 TABLET ORAL EVERY 6 HOURS PRN
Qty: 60 TABLET | Refills: 1 | Status: SHIPPED | OUTPATIENT
Start: 2022-12-05

## 2022-12-05 RX ORDER — SENNA AND DOCUSATE SODIUM 50; 8.6 MG/1; MG/1
1 TABLET, FILM COATED ORAL DAILY
Qty: 30 TABLET | Refills: 1 | Status: SHIPPED | OUTPATIENT
Start: 2022-12-05

## 2022-12-05 RX ORDER — FENTANYL CITRATE 50 UG/ML
INJECTION, SOLUTION INTRAMUSCULAR; INTRAVENOUS PRN
Status: DISCONTINUED | OUTPATIENT
Start: 2022-12-05 | End: 2022-12-05 | Stop reason: SDUPTHER

## 2022-12-05 RX ORDER — OXYCODONE HYDROCHLORIDE 5 MG/1
5 TABLET ORAL EVERY 6 HOURS PRN
Qty: 18 TABLET | Refills: 0 | Status: SHIPPED | OUTPATIENT
Start: 2022-12-05 | End: 2022-12-05 | Stop reason: SDUPTHER

## 2022-12-05 RX ORDER — SIMETHICONE 80 MG
80 TABLET,CHEWABLE ORAL 4 TIMES DAILY PRN
Qty: 30 TABLET | Refills: 0 | Status: SHIPPED | OUTPATIENT
Start: 2022-12-05

## 2022-12-05 RX ORDER — SODIUM CHLORIDE, SODIUM LACTATE, POTASSIUM CHLORIDE, CALCIUM CHLORIDE 600; 310; 30; 20 MG/100ML; MG/100ML; MG/100ML; MG/100ML
INJECTION, SOLUTION INTRAVENOUS CONTINUOUS
Status: DISCONTINUED | OUTPATIENT
Start: 2022-12-05 | End: 2022-12-05 | Stop reason: HOSPADM

## 2022-12-05 RX ORDER — LIDOCAINE HYDROCHLORIDE 10 MG/ML
1 INJECTION, SOLUTION EPIDURAL; INFILTRATION; INTRACAUDAL; PERINEURAL
Status: DISCONTINUED | OUTPATIENT
Start: 2022-12-05 | End: 2022-12-05 | Stop reason: HOSPADM

## 2022-12-05 RX ORDER — KETOROLAC TROMETHAMINE 30 MG/ML
INJECTION, SOLUTION INTRAMUSCULAR; INTRAVENOUS PRN
Status: DISCONTINUED | OUTPATIENT
Start: 2022-12-05 | End: 2022-12-05 | Stop reason: SDUPTHER

## 2022-12-05 RX ORDER — IBUPROFEN 800 MG/1
800 TABLET ORAL EVERY 8 HOURS PRN
Qty: 60 TABLET | Refills: 1 | Status: SHIPPED | OUTPATIENT
Start: 2022-12-05

## 2022-12-05 RX ORDER — PROPOFOL 10 MG/ML
INJECTION, EMULSION INTRAVENOUS PRN
Status: DISCONTINUED | OUTPATIENT
Start: 2022-12-05 | End: 2022-12-05 | Stop reason: SDUPTHER

## 2022-12-05 RX ORDER — OXYCODONE HYDROCHLORIDE 5 MG/1
5 TABLET ORAL EVERY 6 HOURS PRN
Qty: 8 TABLET | Refills: 0 | Status: SHIPPED | OUTPATIENT
Start: 2022-12-05 | End: 2022-12-10

## 2022-12-05 RX ORDER — ONDANSETRON 4 MG/1
4 TABLET, ORALLY DISINTEGRATING ORAL EVERY 8 HOURS PRN
Qty: 30 TABLET | Refills: 0 | Status: SHIPPED | OUTPATIENT
Start: 2022-12-05

## 2022-12-05 RX ORDER — ROCURONIUM BROMIDE 10 MG/ML
INJECTION, SOLUTION INTRAVENOUS PRN
Status: DISCONTINUED | OUTPATIENT
Start: 2022-12-05 | End: 2022-12-05 | Stop reason: SDUPTHER

## 2022-12-05 RX ORDER — MIDAZOLAM HYDROCHLORIDE 1 MG/ML
INJECTION INTRAMUSCULAR; INTRAVENOUS PRN
Status: DISCONTINUED | OUTPATIENT
Start: 2022-12-05 | End: 2022-12-05 | Stop reason: SDUPTHER

## 2022-12-05 RX ORDER — DIPHENHYDRAMINE HYDROCHLORIDE 50 MG/ML
12.5 INJECTION INTRAMUSCULAR; INTRAVENOUS
Status: DISCONTINUED | OUTPATIENT
Start: 2022-12-05 | End: 2022-12-05 | Stop reason: HOSPADM

## 2022-12-05 RX ADMIN — ROCURONIUM BROMIDE 50 MG: 10 INJECTION, SOLUTION INTRAVENOUS at 08:04

## 2022-12-05 RX ADMIN — LIDOCAINE HYDROCHLORIDE 40 MG: 10 INJECTION, SOLUTION EPIDURAL; INFILTRATION; INTRACAUDAL; PERINEURAL at 08:03

## 2022-12-05 RX ADMIN — MIDAZOLAM 2 MG: 1 INJECTION INTRAMUSCULAR; INTRAVENOUS at 07:53

## 2022-12-05 RX ADMIN — FENTANYL CITRATE 25 MCG: 50 INJECTION, SOLUTION INTRAMUSCULAR; INTRAVENOUS at 09:24

## 2022-12-05 RX ADMIN — FENTANYL CITRATE 50 MCG: 50 INJECTION, SOLUTION INTRAMUSCULAR; INTRAVENOUS at 08:03

## 2022-12-05 RX ADMIN — PROPOFOL 150 MG: 10 INJECTION, EMULSION INTRAVENOUS at 08:03

## 2022-12-05 RX ADMIN — SODIUM CHLORIDE, POTASSIUM CHLORIDE, SODIUM LACTATE AND CALCIUM CHLORIDE: 600; 310; 30; 20 INJECTION, SOLUTION INTRAVENOUS at 06:30

## 2022-12-05 RX ADMIN — ONDANSETRON 4 MG: 2 INJECTION INTRAMUSCULAR; INTRAVENOUS at 09:09

## 2022-12-05 RX ADMIN — GLYCOPYRROLATE 0.2 MG: 0.2 INJECTION, SOLUTION INTRAMUSCULAR; INTRAVENOUS at 08:35

## 2022-12-05 RX ADMIN — DEXAMETHASONE SODIUM PHOSPHATE 8 MG: 4 INJECTION, SOLUTION INTRAMUSCULAR; INTRAVENOUS at 08:09

## 2022-12-05 RX ADMIN — SUGAMMADEX 150 MG: 100 INJECTION, SOLUTION INTRAVENOUS at 09:14

## 2022-12-05 RX ADMIN — GABAPENTIN 300 MG: 300 CAPSULE ORAL at 06:14

## 2022-12-05 RX ADMIN — HYDROMORPHONE HYDROCHLORIDE 0.5 MG: 1 INJECTION, SOLUTION INTRAMUSCULAR; INTRAVENOUS; SUBCUTANEOUS at 09:40

## 2022-12-05 RX ADMIN — FENTANYL CITRATE 25 MCG: 50 INJECTION, SOLUTION INTRAMUSCULAR; INTRAVENOUS at 08:49

## 2022-12-05 RX ADMIN — ACETAMINOPHEN 1000 MG: 500 TABLET ORAL at 06:13

## 2022-12-05 RX ADMIN — KETOROLAC TROMETHAMINE 30 MG: 30 INJECTION, SOLUTION INTRAMUSCULAR; INTRAVENOUS at 09:11

## 2022-12-05 ASSESSMENT — PAIN SCALES - GENERAL
PAINLEVEL_OUTOF10: 5
PAINLEVEL_OUTOF10: 7

## 2022-12-05 ASSESSMENT — PAIN - FUNCTIONAL ASSESSMENT
PAIN_FUNCTIONAL_ASSESSMENT: 0-10
PAIN_FUNCTIONAL_ASSESSMENT: ACTIVITIES ARE NOT PREVENTED

## 2022-12-05 ASSESSMENT — PAIN DESCRIPTION - PAIN TYPE: TYPE: SURGICAL PAIN

## 2022-12-05 ASSESSMENT — LIFESTYLE VARIABLES: SMOKING_STATUS: 1

## 2022-12-05 ASSESSMENT — PAIN DESCRIPTION - LOCATION: LOCATION: ABDOMEN

## 2022-12-05 ASSESSMENT — PAIN DESCRIPTION - DESCRIPTORS
DESCRIPTORS: CRAMPING
DESCRIPTORS: CRAMPING

## 2022-12-05 NOTE — INTERVAL H&P NOTE
PRE OP NOTE  Gyn Surgery    [facility]  Gynecologic Surgery  PHYSICIAN PRE-OPERATIVE NOTE:      Patient Name: Naty Terrazas  Patient : 1990  Room/Bed: [address] [place] [address] [address] Pool/NONE  Admission Date/Time: 2022  5:47 AM  Primary Care Physician: No primary care provider on file. MRN #: [id number]  [facility] #: [id number]        Date: 2022  Time: 7:45 AM    The patient was seen in pre-op holding. The procedure risks and complications were reviewed. The labs, Consent, and H&P were reviewed and updated. The patient was counseled on the possibility of  the need of a second surgery. The patient voiced understanding and had all of her questions answered. The possibility of incomplete removal of abnormal tissue was discussed. Past Medical History:   Diagnosis Date    Anemia     Depression     Heavy menses     TSH deficiency 2019       Patient Active Problem List    Diagnosis Date Noted    [facility]     Priority: High    Pelvic pain 2022     21 F Apg 8/9 Wt 7#10 2019    Depression     Cannabis abuse - unable to determine 2016         History reviewed. No pertinent surgical history.     Family History   Problem Relation Age of Onset    Hypertension Other         G.Parents    Depression Mother     Hypertension Mother     Alcohol Abuse Mother     Diabetes Mother     Diabetes Other         G.Parents    Diabetes Brother        Social History     Socioeconomic History    Marital status: Single     Spouse name: Not on file    Number of children: Not on file    Years of education: Not on file    Highest education level: Not on file   Occupational History    Not on file   Tobacco Use    Smoking status: Some Days     Types: Cigarettes    Smokeless tobacco: Never   Vaping Use    Vaping Use: Never used   Substance and Sexual Activity    Alcohol use: Yes     Comment: Social    Drug use: Not Currently     Types: Marijuana [facility] [place] [place])    Sexual activity: Yes     Partners: Male   Other Topics Concern    Not on file   Social History Narrative    Not on file     Social Determinants of Health     Financial Resource Strain: Not on file   Food Insecurity: Not on file   Transportation Needs: Not on file   Physical Activity: Not on file   Stress: Not on file   Social Connections: Not on file   Intimate Partner Violence: Not on file   Housing Stability: Not on file         No current facility-administered medications on file prior to encounter. No current outpatient medications on file prior to encounter. Current Facility-Administered Medications   Medication Dose Route Frequency Provider Last Rate Last Admin    sodium chloride flush 0.9 % injection 5-40 mL  5-40 mL IntraVENous 2 times per day Marian Norwood MD        sodium chloride flush 0.9 % injection 5-40 mL  5-40 mL IntraVENous PRN Brayden Miles MD        0.9 % sodium chloride infusion   IntraVENous PRN Marian Norwood MD        lactated ringers infusion   IntraVENous Continuous Marian Norwood  mL/hr at 12/05/22 0745 NoRateChange at 12/05/22 0745    lidocaine PF 1 % injection 1 mL  1 mL IntraDERmal Once PRN Marian Norwood MD        lactated ringers infusion   IntraVENous Continuous Lodema Sartorius, DO        sodium chloride flush 0.9 % injection 5-40 mL  5-40 mL IntraVENous 2 times per day Lodema Sartorius, DO        sodium chloride flush 0.9 % injection 5-40 mL  5-40 mL IntraVENous PRN Lodema Sartorius, DO        0.9 % sodium chloride infusion   IntraVENous PRN Lodema Sartorius, DO               Allergies as of 09/29/2022    (No Known Allergies)         Vitals:    12/05/22 0615   BP: 113/62   Pulse: 55   Resp: 16   Temp: 96.9 °F (36.1 °C)   TempSrc: Infrared   SpO2: 97%   Weight: 165 lb (74.8 kg)   Height: 5' 3\" (1.6 m)       PE: Unchanged from H&P    Diagnosis:  1. Menorrhagia with irregular cycle  2. Dysmenorrhea      Procedure Planned:  1. Medically indicated laparoscopic bilateral salpingectomy  2. Hysteroscopy with Novasure endometrial ablation            Admission on 12/05/2022   Component Date Value Ref Range Status    HCG, Pregnancy Urine (POC) 12/05/2022 NEGATIVE  NEGATIVE Final    Comment:    HCG screen is sensitive to 25 mIU/mL. However this test may  lower levels  of HCG. If further evaluation is needed  please request quantitative HCG. Hospital Outpatient Visit on 11/21/2022   Component Date Value Ref Range Status    Expiration Date 11/21/2022 12/08/2022,2359   Final    Arm Band Number 11/21/2022 [id number]   Final    ABO/Rh 11/21/2022 O POSITIVE   Final    Antibody Screen 11/21/2022 NEGATIVE   Final    Specimen Description 11/21/2022 . CLEAN CATCH URINE   Final    Culture 11/21/2022 NO SIGNIFICANT GROWTH   Final    Color, UA 11/21/2022 Yellow  Yellow Final    Turbidity UA 11/21/2022 Cloudy (A)  Clear Final    Glucose, Ur 11/21/2022 NEGATIVE  NEGATIVE Final    Bilirubin Urine 11/21/2022 NEGATIVE  NEGATIVE Final    Ketones, Urine 11/21/2022 TRACE (A)  NEGATIVE Final    Specific Gravity, UA 11/21/2022 1.031 (A)  1.000 - 1.030 Final    Urine Hgb 11/21/2022 TRACE (A)  NEGATIVE Final    pH, UA 11/21/2022 5.0  5.0 - 8.0 Final    Protein, UA 11/21/2022 TRACE (A)  NEGATIVE Final    Urobilinogen, Urine 11/21/2022 Normal  Normal Final    Nitrite, Urine 11/21/2022 NEGATIVE  NEGATIVE Final    Leukocyte Esterase, Urine 11/21/2022 NEGATIVE  NEGATIVE Final    hCG Quant 11/21/2022 <1  <5 mIU/mL Final    Comment:    Non-preg premeno   <=5  Postmeno           <=8  Male               <=3  If HCG results do not concur with clinical observations, additional testing to confirm   results is recommended. Protime 11/21/2022 12.9  11.8 - 14.6 sec Final    INR 11/21/2022 1.0   Final    Comment:       Non-therapeutic Range:     INR = 0.9-1.2  Therapeutic Range:    Moderate Anticoagulant Intensity:     INR = 2.0-3.0   High Anticoagulant Intensity:     INR = 2.5-3.5            WBC 11/21/2022 8.4  3.5 - 11.0 k/uL Final RBC 11/21/2022 4.61  4.0 - 5.2 m/uL Final    Hemoglobin 11/21/2022 13.2  12.0 - 16.0 g/dL Final    Hematocrit 11/21/2022 39.4  36 - 46 % Final    MCV 11/21/2022 85.4  80 - 100 fL Final    MCH 11/21/2022 28.6  26 - 34 pg Final    MCHC 11/21/2022 33.5  31 - 37 g/dL Final    RDW 11/21/2022 14.7  11.5 - 14.9 % Final    Platelets 58/95/8223 420  150 - 450 k/uL Final    MPV 11/21/2022 7.7  6.0 - 12.0 fL Final    Seg Neutrophils 11/21/2022 67 (A)  36 - 66 % Final    Lymphocytes 11/21/2022 24  24 - 44 % Final    Monocytes 11/21/2022 7  1 - 7 % Final    Eosinophils % 11/21/2022 2  0 - 4 % Final    Basophils 11/21/2022 0  0 - 2 % Final    Segs Absolute 11/21/2022 5.70  1.3 - 9.1 k/uL Final    Absolute Lymph # 11/21/2022 2.00  1.0 - 4.8 k/uL Final    Absolute Mono # 11/21/2022 0.60  0.1 - 1.3 k/uL Final    Absolute Eos # 11/21/2022 0.10  0.0 - 0.4 k/uL Final    Basophils Absolute 11/21/2022 0.00  0.0 - 0.2 k/uL Final    Glucose 11/21/2022 95  70 - 99 mg/dL Final    BUN 11/21/2022 9  6 - 20 mg/dL Final    Creatinine 11/21/2022 0.66  0.50 - 0.90 mg/dL Final    Est, Glom Filt Rate 11/21/2022 >60  >60 mL/min/1.73m2 Final    Comment:       Effective Oct 3, 2022        These results are not intended for use in patients <25years of age. eGFR results are calculated without a race factor using the 2021 CKD-EPI equation. Careful clinical correlation is recommended, particularly when comparing to results   calculated using previous equations. The CKD-EPI equation is less accurate in patients with extremes of muscle mass, extra-renal   metabolism of creatine, excessive creatine ingestion, or following therapy that affects   renal tubular secretion.       Calcium 11/21/2022 9.1  8.6 - 10.4 mg/dL Final    Sodium 11/21/2022 136  135 - 144 mmol/L Final    Potassium 11/21/2022 3.8  3.7 - 5.3 mmol/L Final    Chloride 11/21/2022 100  98 - 107 mmol/L Final    CO2 11/21/2022 25  20 - 31 mmol/L Final    Anion Gap 11/21/2022 11  9 - 17 mmol/L Final    PTT 11/21/2022 33.1  24.0 - 36.0 sec Final    Comment:       IV Heparin Therapy Range:      62.0-94.0            Ventricular Rate 11/21/2022 56  BPM Final    Atrial Rate 11/21/2022 56  BPM Final    P-R Interval 11/21/2022 114  ms Final    QRS Duration 11/21/2022 72  ms Final    Q-T Interval 11/21/2022 428  ms Final    QTc Calculation (Bazett) 11/21/2022 413  ms Final    P Axis 11/21/2022 63  degrees Final    R Axis 11/21/2022 63  degrees Final    T Axis 11/21/2022 30  degrees Final    WBC, UA 11/21/2022 21 TO 50  /HPF Final    RBC, UA 11/21/2022 3 to 5  /HPF Final    Casts UA 11/21/2022 0 TO 2  /LPF Final    Epithelial Cells UA 11/21/2022 21 TO 50  /HPF Final    Bacteria, UA 11/21/2022 MODERATE (A)  None Final   ]  Results for orders placed or performed during the hospital encounter of 12/05/22   POCT HCG, Prenancy, Ur   Result Value Ref Range    HCG, Pregnancy Urine (POC) NEGATIVE NEGATIVE           The patient is ready for transport to the operative suite.       Electronically signed by Annetta Cuellar DO on 12/5/2022 at 7:45 AM

## 2022-12-05 NOTE — ANESTHESIA POSTPROCEDURE EVALUATION
Department of Anesthesiology  Postprocedure Note    Patient: Chel Beck  MRN: [id number]  [place]: 1990  Date of evaluation: 12/5/2022      Procedure Summary     Date: 12/05/22 Room / Location: [facility] [address] / [facility]: SONYA ADAMS    Anesthesia Start: 4398 Anesthesia Stop: 0929    Procedures:       ENDOMETRIAL ABLATION WITH [place] Vikash HYSTEROSCOPY (Uterus)      MEDICALLY INDICATED LAPAROSCOPIC SALPINGECTOMY (Bilateral: Abdomen) Diagnosis:       Menorrhagia with regular cycle      Dysmenorrhea      (MENORRHAGIA)      (DYSMENORRHEA)    Surgeons: Katelynn Stephenson DO Responsible Provider: Alysia Goodman MD    Anesthesia Type: general ASA Status: 2          Anesthesia Type: No value filed.     Chung Phase I: Chung Score: 9    Chung Phase II: Chung Score: 10      Anesthesia Post Evaluation    Comments: POST- ANESTHESIA EVALUATION       Pt Name: Chel Beck  MRN: [id number]  [place]: 1990  Date of evaluation: 12/5/2022  Time:  3:19 PM      /61   Pulse 59   Temp 97 °F (36.1 °C) (Temporal)   Resp 16   Ht 5' 3\" (1.6 m)   Wt 165 lb (74.8 kg)   LMP 12/04/2022 (Exact Date)   SpO2 97%   BMI 29.23 kg/m²      Consciousness Level  Awake  Cardiopulmonary Status  Stable  Pain Adequately Treated YES  Nausea / Vomiting  NO  Adequate Hydration  YES  Anesthesia Related Complications NONE      Electronically signed by Alysia Goodman MD on 12/5/2022 at 3:19 PM

## 2022-12-05 NOTE — OP NOTE
Gynecologic Operative Note        NAME: Meera Stevenson   MRN: [id number]  CSN: [id number]  : 1990    PROCEDURE DATE: 2022     Pre-op Diagnosis: MENORRHAGIA  DYSMENORRHEA and a Medically Indicated Bilateral Salpingectomy     Post-op Diagnosis: Same    Procedure: Novasure Hysteroscopic Endometrial Ablation and Medically Indicated Salpingectomy Bilateral    Surgeon: Luis Enrique Amaro DO    Assistant:   Mukund Olson D.O. PGY3    Circulator Documentation of Staff:  Surgeon(s) and Role:     * Luis Enrique Amaro DO - Primary    OR Staff:  Scrub Person First: Chanda Vasquez      Anesthesia Type: General  Anesthesia Staff: Anesthesiologist: Peggy Billy MD  CRNA: HALEY Gonzalez CRNA      Complications: None      Estimated Blood Loss: 10 ml  Total IV Fluids:  600 ml  Urine Output: 100 ml clear    Distention Media: NS (Accurate I/O at the completion of the procedure)    Specimens:   ID Type Source Tests Collected by Time Destination   A : FALLOPIAN TUBE RIGHT Tissue Fallopian Tube [facility],  2022 0844    B : FALLOPIAN TUBE LEFT Tissue Fallopian Tube SURGICAL PATHOLOGY Luis Enrique Amaro,  2022 0845      Implants: * No implants in log *    Drains:   Urinary Catheter [address] (Active)   Site Assessment Moist;Pink 22 0910   Urine Color Yellow 22 0910   Urine Appearance Clear 22 0910   Output (mL) 100 mL 22 0910         Condition: Stable    Findings: small midplane uterus. Endometrium fluffy appearance c/w menstruation. Laparoscopic findings. Bilateral fallopian tubes visualized from cornual to fimbria. Bilateral salpingectomy performed without complication      Sound: 10 cm  Net Length: 6 cm  Width: 3.5 cm  Power: 116 hurst  Time: 65 seconds    Description of Procedure: (Understanding of limitations from template op-reports exist)  The patient was seen in the pre-operative area.  The procedure risk and complications were reviewed. The consent , labs , and H&P were reviewed. The patient had all of her questions answered. The patient was moved to the operative suite where she was placed under general anesthesia by the anesthesiologist.  Time out was preformed. The patient was placed in the dorsal lithotomy position utilizing yellow fin Stirrups. The Positioning was checked without stress or pressure on any joints. Her bladder was drained with a bates catheter. She was prepped and draped in sterile fashion. The uterus was sounded to 10 cm and dilated to  # 7 hegar dilator. The [facility] manipulator was placed through the endocervical canal and attached to the anterior cervical lip. Gloves were then changed and attention was turned to the abdomen. An elliptical infraumbilical skin incision was made. The tissue was dissected to the level of the fascia. Using \"S\" retractors the fascia was visualized and grasped with kocher's x 2. The fascia was tented and then incised and tagged with two \"O\" vicryl lateral STAY sutures. Digital dissection was utilized to enter the peritoneal cavity, the \"S\" retractors were re-approximated. Utilizing a blunt tipped Gomes trochar #10, this was introduced and secured. CO2 gas was used to create a pneumoperitoneum to 12 mm hg. Trendelenburg was produced. The laparoscope was placed and underlying structures were noted to be without trauma. Visualization of the pelvis was completed with the above findings noted. Under direct visualization a right and left 5 mm bladeless trocar were placed after the skin was scored with a # 15 blade and dissected to the fascia with a hemostat bilaterally. These were placed 3-4 fingers breath above the ASIS on each side with transillumination and direct visualization. Both fallopian tubes were traced from their cornual insertion to the fimbriated ends.   The uterus was deviated laterally  with the manipulator to gain access to the bilateral fallopian tubes. The fallopian tube on the right was mobilized and grasped. The mesosalpinx was tented and utilizing the Ligasure  along the mesosalpinx, the length of the instrument jaws, and then re approximated more anteriorly to just below the fallopian tube staying on the mesosalpinx. This was completed in a stepwise fashion with each segment being released by the trigger blade at the most proximal edge to the fallopian tube. This continued the entire length of the fallopian tube. Excellent hemostasis was achieved along the entire resection line. The fallopian tube was removed through the port. Excellent hemostasis was achieved. The same procedure was completed on the contralateral side. Re-inspection of the bilateral mesenteric edge and tubal stumps were hemostatic. The bilateral tubes were sent to pathology. All instruments and excess gas was removed from the abdomen and the trocars were removed without difficulty. A single finger noted no omentum or small bowel in the infraumbilical site. The fascia was closed by cross tying the STAY sutures. It was checked for any defects and there were not any. Incisions were injected with 0.5 % marcaine without epinephrine for further analgesia and then closed with 4-0 vicryl in a subcuticular fashion and dressed with steri-strips and TinCoBen and a sterile dressing. A weighted speculum was placed along the posterior vaginal wall and the anterior lip of the cervix was visualized and grasped with a double tooth tenaculum and the manipulator was removed. The cervix and uterine length were obtained, see above, with the sound and dilator. The cervix was dilated slightly using hegar dilators to a # 8 and the hysteroscope was placed with the above findings, Intra-Operative video was completed both pre and post ablation. The Novasure ablation wand was placed into the uterus and seated. Measurements were taken after and the wand was opened.    Cavity assessment was undergone and passed and the ablation cycle was started and completed without difficulty. The Novasure wand was closed and removed from the uterus without difficulty in a bow and arrow technique. The hysteroscope was replaced and there was noted to be no trauma or perforation of the uterus, Video was completed. Uniform, excellent endometrial ablation was visualized. The hysteroscope was removed, along with the double tooth tenaculum and there was noted to be adequate hemostasis. The weighted speculum was removed. The patient was awoken from anesthesia. The patient tolerated the procedure well and was taken to PACU in stable condition. All sponge, needle and instrument counts were called for and found to be correct x 3. The sponge stick was removed from the vaginal vault.                       Rosa Sanchez DO 12/05/22 9:11 AM

## 2022-12-05 NOTE — ANESTHESIA PRE PROCEDURE
Department of Anesthesiology  Preprocedure Note       Name:  Eddie Anthony   Age:  29 y.o.  :  1990                                          MRN:  [id number]         Date:  2022      Surgeon: Tori Beyer):  Carol Carter DO    Procedure: Procedure(s):  MEDICALLY INDICATED LAPRASCOPY SALPINGECTOMY  EDOMETRIAL ABLATION WITH Tad Crumbly HYSTEROSCOPY  SALPINGECTOMY LAPAROSCOPIC    Medications prior to admission:   Prior to Admission medications    Not on File       Current medications:    Current Facility-Administered Medications   Medication Dose Route Frequency Provider Last Rate Last Admin    sodium chloride flush 0.9 % injection 5-40 mL  5-40 mL IntraVENous 2 times per day Adriana Tijerina MD        sodium chloride flush 0.9 % injection 5-40 mL  5-40 mL IntraVENous PRN Adriana Tijerina MD        0.9 % sodium chloride infusion   IntraVENous PRN Adriana Tijerina MD        lactated ringers infusion   IntraVENous Continuous Adriana Tijerina  mL/hr at 22 0630 New Bag at 22 0630    lidocaine PF 1 % injection 1 mL  1 mL IntraDERmal Once PRN Adriana Tijerina MD        lactated ringers infusion   IntraVENous Continuous Carol Carter DO        sodium chloride flush 0.9 % injection 5-40 mL  5-40 mL IntraVENous 2 times per day Carol Carter DO        sodium chloride flush 0.9 % injection 5-40 mL  5-40 mL IntraVENous PRN Carol Carter,         0.9 % sodium chloride infusion   IntraVENous PRN Carol Carter DO           Allergies:  No Known Allergies    Problem List:    Patient Active Problem List   Diagnosis Code    Mood disorder ([facility] [address] F39    Cannabis abuse - unable to determine F12.10    Depression F32. A     19 F Apg  Wt 7#10 Z39.2    Pelvic pain R10.2       Past Medical History:        Diagnosis Date    Anemia     Depression     Heavy menses     TSH deficiency 2019       Past Surgical History:  History reviewed.  No pertinent surgical history. Social History:    Social History     Tobacco Use    Smoking status: Some Days     Types: Cigarettes    Smokeless tobacco: Never   Substance Use Topics    Alcohol use: Yes     Comment: Social                                Ready to quit: Not Answered  Counseling given: Not Answered      Vital Signs (Current):   Vitals:    12/05/22 0615   BP: 113/62   Pulse: 55   Resp: 16   Temp: 96.9 °F (36.1 °C)   TempSrc: Infrared   SpO2: 97%   Weight: 165 lb (74.8 kg)   Height: 5' 3\" (1.6 m)                                              BP Readings from Last 3 Encounters:   12/05/22 113/62   11/21/22 (!) 112/53   11/17/22 116/72       NPO Status: Time of last liquid consumption: 1800                        Time of last solid consumption: 1800                        Date of last liquid consumption: 12/04/22                        Date of last solid food consumption: 12/04/22    BMI:   Wt Readings from Last 3 Encounters:   12/05/22 165 lb (74.8 kg)   11/21/22 165 lb (74.8 kg)   11/17/22 175 lb (79.4 kg)     Body mass index is 29.23 kg/m².     CBC:   Lab Results   Component Value Date/Time    WBC 8.4 11/21/2022 07:30 AM    RBC 4.61 11/21/2022 07:30 AM    RBC 4.33 10/04/2011 06:27 AM    HGB 13.2 11/21/2022 07:30 AM    HCT 39.4 11/21/2022 07:30 AM    MCV 85.4 11/21/2022 07:30 AM    RDW 14.7 11/21/2022 07:30 AM     11/21/2022 07:30 AM     10/04/2011 06:27 AM       CMP:   Lab Results   Component Value Date/Time     11/21/2022 07:30 AM    K 3.8 11/21/2022 07:30 AM     11/21/2022 07:30 AM    CO2 25 11/21/2022 07:30 AM    BUN 9 11/21/2022 07:30 AM    CREATININE 0.66 11/21/2022 07:30 AM    GFRAA >60 11/02/2021 09:55 AM    LABGLOM >60 11/21/2022 07:30 AM    GLUCOSE 95 11/21/2022 07:30 AM    GLUCOSE 84 10/04/2011 06:27 AM    PROT 7.7 11/02/2021 09:55 AM    CALCIUM 9.1 11/21/2022 07:30 AM    BILITOT 0.34 11/02/2021 09:55 AM    ALKPHOS 73 11/02/2021 09:55 AM    AST 14 11/02/2021 09:55 AM    ALT 8 11/02/2021 09:55 AM       POC Tests: No results for input(s): POCGLU, POCNA, POCK, POCCL, POCBUN, POCHEMO, POCHCT in the last 72 hours. Coags:   Lab Results   Component Value Date/Time    PROTIME 12.9 11/21/2022 07:30 AM    INR 1.0 11/21/2022 07:30 AM    APTT 33.1 11/21/2022 07:30 AM       HCG (If Applicable):   Lab Results   Component Value Date    PREGTESTUR negative 09/23/2022    HCG NEGATIVE 12/05/2022    HCGQUANT <1 11/21/2022        ABGs: No results found for: PHART, PO2ART, TSI9CHC, PHL4AZB, BEART, M1CUZPDE     Type & Screen (If Applicable):  No results found for: LABABO, LABRH    Drug/Infectious Status (If Applicable):  No results found for: HIV, HEPCAB    COVID-19 Screening (If Applicable):   Lab Results   Component Value Date/Time    COVID19 Not Detected 04/14/2022 10:45 PM    COVID19 Not Detected 04/14/2022 06:31 PM           Anesthesia Evaluation  Patient summary reviewed and Nursing notes reviewed no history of anesthetic complications (never had surgery in the past):   Airway: Mallampati: II  TM distance: >3 FB   Neck ROM: full  Mouth opening: > = 3 FB   Dental: normal exam         Pulmonary:normal exam  breath sounds clear to auscultation  (+) current smoker                           Cardiovascular:Negative CV ROS          ECG reviewed  Rhythm: regular  Rate: normal           Beta Blocker:  Not on Beta Blocker         Neuro/Psych:   (+) psychiatric history:depression/anxiety             GI/Hepatic/Renal:             Endo/Other:    (+) hypothyroidism (no medications; pt denies this)::., .                  ROS comment:     Menorrhagia with regular cycle (N92.0)       Dysmenorrhea  Abdominal:             Vascular: negative vascular ROS. Other Findings:           Anesthesia Plan      general     ASA 2       Induction: intravenous. MIPS: Postoperative opioids intended and Prophylactic antiemetics administered. Anesthetic plan and risks discussed with patient.       Plan discussed with CRNA.                    Anh Lin MD   12/5/2022

## 2022-12-05 NOTE — DISCHARGE INSTRUCTIONS
[address] [facility]  [facility] [address], [address] [place] [address]  [phone]    Discharge Instructions for Laparoscopy     Laparoscopy is a video-assisted surgery done through several small incisions. Laparoscopic surgery has many benefits, including:   Smaller incisions    Less pain, risk of problems, and scarring    [place] hospital stay and shorter recovery time    What You Will Need   Wound care supplies as ordered by your doctor    Anything specific to care for the type of surgery you have had    Steps to [address] the incision area clean and dry. The incisions may be closed with dissolving sutures or a plastic dressing. In that case, specific wound care may not be needed. Wash your hands before changing the dressing. If you need to change or care for your wound, follow your doctor's specific instructions. Ask your doctor about when it is safe to shower, bathe, or soak in water. Diet    Avoid carbonated drinks for about 2 days after surgery. Your doctor may recommend a specific diet depending on the type of surgery you have had. Physical Activity    Ask your doctor when you will be able to return to work. Do not drive unless your doctor has given you permission to do so. Avoid lifting heavy objects for the prescribed amount of time. Medications    If you had to stop medicines before the procedure, ask your doctor when you can start again. Medicines often stopped include:   Anti-inflammatory drugs (eg, aspirin or ibuprofen)   Blood thinners like clopidogrel (Plavix) or warfarin (Coumadin)   Your doctor may prescribe pain medicine for you. If you are taking medicines, follow these general guidelines:   Take your medicine as directed. Do not change the amount or the schedule. Do not stop taking them without talking to your doctor. Do not share them. Know what the results and side effects. Report them to your doctor.     Some drugs can be dangerous when mixed. Talk to a doctor or pharmacist if you are taking more than one drug. This includes over-the-counter medicine and herb or dietary supplements. Plan ahead for refills so you do not run out. Lifestyle Changes    You and your doctor will plan lifestyle changes to aid in your recovery. You may need to consult with specialty doctors if biopsies are done. Results may indicate that you have a condition that needs more study or treatment. Follow-up   Your doctor should receive any biopsy results a few days after the procedure. Schedule a follow-up appointment as directed by your doctor. Call Your Doctor If Any of the Following Occurs   It is important for you to check your recovery once you leave the hospital. That way, you can alert your doctor to any problems. If any of the following occurs, call your doctor:   Signs of infection, including fever and chills    Redness, swelling, increasing pain, excessive bleeding, or discharge from the incision site    Nausea and/or vomiting that you cannot control with the medicines you were given    Pain    Headache, muscle aches, feeling faint or dizzy    Pain, burning, urgency or frequency of urination, or persistent bleeding in the urine    Difficulty urinating or having a bowel movement    Cough, shortness of breath or chest pain      If you think you have an emergency,  CALL [phone] . [address] [facility]  [address]  [phone]    Discharge Instructions for Endometrial Ablation     Endometrial ablation is the removal of the lining of the uterus by surgery. This may involve using heat, cold temperatures, microwave energy, or other methods. Usually, endometrial ablation will make menstrual flow lighter. In some cases, it stops menstrual flow. This procedure reduces your chance of becoming pregnant. Here are steps to take if you have had this surgery.    What You Will Need   Along with your medications, you will need the following:   Warm compress   Sanitary pads   Steps to [address]    It is common to experience some vaginal bleeding and cramping for 1-3 days. You may also feel the urge to go to the bathroom and have some nausea for a day or so. Follow these steps for a positive recovery:   Rest for 1-2 days. Ask family or friends to help with daily tasks when you first get home. To reduce pain, apply a warm compress to your abdomen. Make sure you have a supply of sanitary pads at home. Change your pads frequently. Ask your doctor when you can resume using tampons and douching. You may need to wait two weeks. Shower as usual.      Keep in mind that:   You will still need to use birth control to prevent pregnancy. It may take several months before you notice the full effects of the procedure (ie, decrease in amount of menstrual flow)    Diet    Eat a healthy diet. Include iron-rich foods to balance the blood loss. This can include foods such as oysters, liver, fortified cereal, and spinach. Some pain medicine can cause constipation . To avoid this problem:   Drink plenty of fluids. Eat foods high in fiber, such as:   Whole grain cereals and breads   Fruits and vegetables   Legumes (eg, beans, lentils)   Physical Activity    You should be able to return to normal activities within 1-2 days. If you had anesthesia, do not drive, operate machinery, or make any major decisions for at least 24 hours. Ask your doctor when you will be able:   Return to work   Resume sexual activity   Do strenuous exercise (eg, heavy lifting)   Drive   Medications    Your doctor may recommend pain medication, such as:   Mild narcotic    Over-the-counter pain reliever (eg, ibuprofen )      If you are taking medications, follow these general guidelines:   Take your medication as directed. Do not change the amount or the schedule. Do not stop taking them without talking to your doctor.     Do not share them. Know what the results and side effects. Report them to your doctor. Some drugs can be dangerous when mixed. Talk to a doctor or pharmacist if you are taking more than one drug. This includes over-the-counter medication and herb or dietary supplements. Plan ahead for refills so you don't run out. Follow-up   Your doctor will want to monitor your progress. Be sure to keep all of your appointments. Remember that you will still need to have routine Pap test and pelvic exams. Call Your Doctor If Any of the Following Occurs   After you leave the hospital, call your doctor if any of the following occurs:   Heavy vaginal bleeding   Severe abdominal cramping and pelvic pain   Severe pain during sex   Severe low back pain   Pain during bowel movements or urination   Signs of infection, including fever and chills   Nausea and vomiting   Cough, chest pain, or shortness of breath   Dizziness or lightheadedness   Pain or tenderness in the calf or leg   Menstruation does not get lighter after 2-3 periods   In case of an emergency,  CALL 911  .

## 2022-12-05 NOTE — PROGRESS NOTES
CLINICAL PHARMACY NOTE: MEDS TO BEDS    Total # of Prescriptions Filled: 6   The following medications were delivered to the patient:  Ibuprofen 800mg  Stimulant Laxative 8.6-50  Ondansetron 4mg ODT  Simethicone 80mg Chew  Acetaminophen Extra Strength 500mg  Oxycodone 5mg    Additional Documentation:  Picked up in pharmacy

## 2022-12-06 LAB — SURGICAL PATHOLOGY REPORT: NORMAL

## 2023-01-04 ENCOUNTER — TELEMEDICINE (OUTPATIENT)
Dept: OBGYN CLINIC | Age: 33
End: 2023-01-04

## 2023-01-04 DIAGNOSIS — Z98.890 S/P ENDOMETRIAL ABLATION: ICD-10-CM

## 2023-01-04 DIAGNOSIS — Z98.890 POST-OPERATIVE STATE: Primary | ICD-10-CM

## 2023-01-04 PROCEDURE — 99024 POSTOP FOLLOW-UP VISIT: CPT | Performed by: OBSTETRICS & GYNECOLOGY

## 2023-01-04 NOTE — PROGRESS NOTES
Avtar Ward  2023  8:45 AM      Avtar Ward (:  1990) has requested an audio/video evaluation for the following concern(s):    1. Post-operative state    2. S/p dx vanessa, BS, Novasure ablation 22          TELEHEALTH EVALUATION -- Audio/Visual (During [facility]NFX-29 public health emergency)        Avtar Ward  Procedure: Novasure endometrial ablation with hysteroscopy with medically indicated bilateral salpingectomy      Avtar Ward is a 29 y.o. female [id number]      She denies any complaints. She denied any shortness of breath, chest pain or dizziness. She denied any nausea, vomiting, or diarrhea. There is no fever, chills, or rigors. The patient denies any vaginal bleeding, discharge or odor. All of her pre-operative complaints are now resolved. currently breastfeeding. The PE is limited due to the Virtual Visit    Abdominal Exam: soft non-tender. Good bowel sounds. No guarding, rebound or rigidity. No costal vertebral angle tenderness bilateral. No hernias. Evaluation with patient participation and visualization-(Viewed Virtually)    Incision: healing well, no drainage, no erythema (by video confirmation) (Viewed Virtually)    Extremities: No edema or calf pain noted bilaterally. Patient participation. (Viewed Virtually)    Pelvic Exam: Virtual visit-not completed      Results for orders placed or performed during the hospital encounter of 22   SURGICAL PATHOLOGY REPORT   Result Value Ref Range    Surgical Pathology Report       -- Diagnosis --  A. Fallopian tube, right, medically indicated salpingectomy:  Fimbriated fallopian tube, no pathologic abnormality. B.  Fallopian tube, left, medically indicated salpingectomy:  Fimbriated fallopian tube, no pathologic abnormality.       Mamta Estrada  **Electronically Signed Out**         /2022       Clinical Information  Pre-op Diagnosis:  MENORRHAGIA, DYSMENORRHEA   Operative Findings:  FALLOPIAN TUBE RIGHT; FALLOPIAN TUBE LEFT  Operation Performed:  MEDICALLY INDICATED LAPAROSCOPY SALPINGECTOMY,  ENDOMETRIAL ABLATION WITH NOVASURE HYSTEROSCOPY, SALPINGECTOMY  LAPAROSCOPIC    Source of Specimen  A: FALLOPIAN TUBE RIGHT  B: FALLOPIAN TUBE LEFT    Gross Description  A. \"MARISOL BOOKER, FALLOPIAN TUBE RIGHT\" 5.5 cm long x 0.5 cm in  diameter fimbriated fallopian tube segment. The serosa is pink-tan  and the lumen is unremarkable. Entirely 2cs. B. \"BRIANCA CRUMP,  FALLOPIAN TUBE LEFT\" 6.5 cm long x 0.5 cm in diameter fimbriated  fallopian tube s egment. The serosa is pink-tan and the lumen is  unremarkable. Entirely 2cs. tm      Microscopic Description  A, B.  2 H&E reviewed for each. Microscopic examination performed. SURGICAL PATHOLOGY CONSULTATION       Patient Name: Anisha Carl: [id number]  Path Number: [id number]    [facility] [address] [place], [address]  [phone]  Fax: [phone]     POCT HCG, Prenancy, Ur   Result Value Ref Range    HCG, Pregnancy Urine (POC) NEGATIVE NEGATIVE           Assessment:      Diagnosis Orders   1. Post-operative state        2. S/p dx lap, BS, Novasure ablation 22             Patient Active Problem List    Diagnosis Date Noted    [facility]     Priority: High    S/p dx lap, BS, Novasure ablation 2022     Priority: Medium    Pelvic pain 2022     21 F Apg 8/9 Wt 7#10 2019    Depression     Cannabis abuse - unable to determine 2016          POD# 3 weeks   Procedure: Novasure endometrial ablation with hysteroscopy with medically indicated bilateral salpingectomy   Stable   Pathology reviewed and found to be benign.  Yes    Plan:   Return in about 1 year (around 2024) for Annual Exam.        Tricia Torres is a 29 y.o. female female was evaluated by a Virtual Visit (video visit) encounter to address concerns as mentioned above. A caregiver was present when appropriate. Due to this being a TeleHealth encounter (During [facility] public health emergency), evaluation of the following organ systems was limited: Vitals/Constitutional/EENT/Resp/CV/GI//MS/Neuro/Skin/Heme-Lymph-Imm. Pursuant to the emergency declaration under the [facility], [phone] waiver authority and the [employer] Act, this Virtual Visit was conducted with patient's (and/or legal guardian's) consent, to reduce the patient's risk of exposure to COVID-19 and provide necessary medical care. The patient (and/or legal guardian) has also been advised to contact this office for worsening conditions or problems, and seek emergency medical treatment and/or call 911 if deemed necessary. Services were provided through a video synchronous discussion virtually to substitute for in-person clinic visit. Patient and provider were located at their individual homes. Electronically signed by German Rodgers DO on 1/4/23 at 8:43 AM EST     An electronic signature was used to authenticate this note. The patient, Nayla Dahl is a 29 y.o. female, was seen with a total time spent of 20 minutes for the visit on this date of service by the E/M provider. The time component had both face to face and non face to face time spent in determining the total time component. Counseling and education regarding her diagnosis listed below and her options regarding those diagnoses were also included in determining her time component. Diagnosis Orders   1. Post-operative state        2. S/p dx vanessa, BS, Novasure ablation 12/5/22             The patient had her preventative health maintenance recommendations and follow-up reviewed with her at the completion of her visit.

## 2023-04-11 NOTE — FLOWSHEET NOTE
Patient admitted to room 176 from ED per wheelchair. Here with c/o contractions every 1-2 minutes. Denies ROM or vaginal bleeding. Denies N/V/D. Denies fever/chills. Relates of + fetal movement. Request for urine sample made. Instructed on clean catch urine. Consent obtained. Patient verbalizes understanding and acceptance. Assisted into bed, Siderails up x2. Call light in reach. Bed in low position. Oriented to room, surroundings, call system and plan of care. Patient verbalizes understanding. EFM applied and monitor test completed/passed. Physical Therapy Discharge    Visit Type: Discharge Summary  Visit: 5  Referring Provider: Elisa Garcias MD  Medical Diagnosis (from order): Diagnosis Information    Diagnosis  618.89 (ICD-9-CM) - N81.89 (ICD-10-CM) - Pelvic floor weakness  788.31 (ICD-9-CM) - N39.41 (ICD-10-CM) - Urge incontinence         SUBJECTIVE                                                                                                               4/11/23 Less leakage and feeling better. Bowel is still an issue and is following up with surgeon. States occasionally looses stool. This was not mentioned before but issue with diarrhea form gall bladder removal. Re-discussed issues from gallbladder: had sepsis and bacteria.This 99 % better. Bowels are still an issue. Will still have pain before and after with a BM, but not every time like before. Is walking for 1 hr, but rests after 10-15 minutes.        2/23/23  Started evaluation without  per patient but daughter was in the area and came 20 minutes into session to help as needed. 3 months ago, 10/17/22 had surgery for prolapsed vaginal, rectocele, cystocele and enterocele. Is better but still issues and hard to hold urine for stress and urgency leakage. Had issues after removed catheter. Gave meds and helped but still uses pads. Uses 2 at the same time, so changes little one 5x/day     BLADDER: varies with what drinks; 30 minutes to 2 hrs, usually 0-1x/night if drinks before bed. Initiates okay, but may not empty all the way, sometimes feels not full but has to go and does not have a lot of pressure, slow and feels has to push out the urine, tends to wait after voids to see if more come out. Usually no infections. Had weird issue last summer that was not a real infection but they treated it as was, took 4 months, primary MD thought lack of estrogen and took cream and it helped. Still with mild discomfort. Pads 5x/day     BOWEL: 6 years ago after gallbladder remvoed had  manjeet 20x per day, 2 yrs ago saw a different MD and had bacteria very bad (neumococius) and on antibiotics.  Is 80-90 %better. Does occasionally have a normal BM, but may change at any time, does not eat if has an appointment as is worried  Functional Change: Is holding longer, less leakage, less pad usage, walking 1 hr,   Current Functional Limitations: Occasionally leaks urine and bowel    Pain / Symptoms  - Pain rating (out of 10): Current: 0 ; Best: 0; Worst: 10      OBJECTIVE                                                                                                                    Observation   4/11/23  TRUNK: FB WNL BB 1/3  SB WNL ROT WNL  Hip IR/ER R 25/25 L 30/22  Hip flexion 110 jennifer  Hamstrings  R-25 L -30  Knee -2-135 jennifer  DF/PF 10/65 jennifer  STRENGTH: SLS R 13 sec L 9 sec eyes closed R 3 L 0 sec with shoes on; shaky  Abdominals: 4+  Medium bulge with cough  Hip 4+ jennifer  Knee extension 4/4+ jennifer  Hamstrings 4+/5- jennifer                   Outcome/Assessments  2/23/23 PFD!  POPD! 8/24  CRAD 29/32  MARIN 24/24  = 61/80    4/11/23   POPD! 5/24  CRAD 22/32  MARIN 13/24  = 40/80      Treatment     Therapeutic Exercise  4/11/23 see observation this date  Bladder log: from 2/25/23: 11 times with 1x/per night,  17 cups water, 2.5 cups tea, 1 cup soup; leaked all day and used pads but not sure how many)   From 4/5/23 Fluid 4 cups water, 2 cups tea, 1.5 cups kefir and 2 cups soup: 10x w 1 at night void, 2 leakage, urges 1-2  Urge control  bridge with adductors and clam shell  Diaphragmatic breathing  TRA holds with pelvic floor   PFM in isolation 5 sec holds 5 sec rest , 5 rpes 5x/day    double void  Sleep longer  Calf stretch  SLS holding on  Added:  March bridge   SL bridge  SLS        Home Exercise Program  Access Code: [email or web address]  URL: [email or web address]  Date: 04/11/2023  Prepared by: Le Smalls    Exercises  - Marching Bridge  - 2 x daily - 7 x weekly - 2 sets - 10 reps  -  Single Leg Bridge  - 2 x daily - 7 x weekly - 10 reps - 10 hold  - Single Leg Stance  - 2 x daily - 7 x weekly - 2 sets - 2 reps - 30 hold  Access Code: [email or web address]  URL: [email or web address]  Date: 04/06/2023  Prepared by: Le Smalls    Exercises  - Gastroc Stretch on Wall  - 2 x daily - 7 x weekly - 2 sets - 2 reps - 45 hold  - Single Leg Stance with Support  - 2 x daily - 7 x weekly - 2 sets - 2 reps - 45 hold      Access Code: [email or web address]  URL: [email or web address]  Date: 03/23/2023  Prepared by: Le Smalls    Exercises  - Supine Bridge with Resistance Band  - 1 x daily - 7 x weekly - 3 sets - 10 reps  - Prone Hip Extension with Resistance Loop  - 1 x daily - 7 x weekly - 3 sets - 10 reps  - Supine Diaphragmatic Breathing  - 5 x daily - 7 x weekly - 5 reps - 3 hold  - Supine Transversus Abdominis Bracing with Pelvic Floor Contraction  - 5 x daily - 7 x weekly - 5 reps - 3 hold        ASSESSMENT                                                                                                            4/11/23 Patient seen 5 times in physical therapy for PFM weakness and urge incontinence with a 3-4 week delay from 1st appt to 2nd appt due to bowel issue with diarrhea. Has met all current goals though ROM, strength and balance are with deficits. May benefit to return  In the future if issues donot continue to improve. Patient also mentioned today of issues with stool incontinence loose and hard. Also c/o pain in abdomen off and on with BM before and after.  Pain/symptoms after session (out of 10): 0  Education:   - Results of above outlined education: Verbalizes understanding, Demonstrates understanding and Needs reinforcement    PLAN                                                                                                                           Discharge from skilled therapy with instructions/recommendations to: continue home exercise program,  follow up with referring provider    Suggestions for next session as indicated: 4/11/23 discharge         Goals  3/30/23PFM strength 1+    3/23/23 POSTURE: head FW, Shoulders FW and L lower, R side pelvis lower, R tibia varus, increased kyphosis  TRUNK FB WNL BB 1/3 SB 1/8 jennifer ROT 1/8 jennifer  Hip IR/ER R 25/20 L 25/10  Hip flexion 105 jennifer  Hamstrings -35 jennifer  Knee 0-130 jennifer  DF/PF 5/40    STRENGTH:   SLS 3-4 jennifer shaky with shoes on  Abdominals: 4/4+  Med/large bulge with cough  Hip 4+  Knees 4+    The above improvements in impairments to assist in obtaining goals listed below  Long Term Goals: to be met by end of plan of care  1. Decrease UFDI form 61/80 to 41/80 and decrease pad usage to 3 per day form 5-8, urge to 1-2 from 2-3 Status: met   2. Void every 2 hrs from 30 minutes during the day Status: met   3. Education on posture, fluid intake, double void and urgency Status: met   4. Increase strength 1/2-1 grade to decrease urgency and leakage to 1x/day  Status: met   5. Increase trunk ROM to WNL except BB to 1/3 and Bilateral LE's 5-20 degrees to allow full reach/bend to floor, squat,  Status: met       Therapy procedure time and total treatment time can be found documented on the Time Entry flowsheet

## 2024-01-02 ENCOUNTER — OFFICE VISIT (OUTPATIENT)
Dept: OBGYN CLINIC | Age: 34
End: 2024-01-02
Payer: COMMERCIAL

## 2024-01-02 VITALS
BODY MASS INDEX: 31.47 KG/M2 | HEIGHT: 62 IN | WEIGHT: 171 LBS | SYSTOLIC BLOOD PRESSURE: 122 MMHG | DIASTOLIC BLOOD PRESSURE: 80 MMHG

## 2024-01-02 DIAGNOSIS — N64.4 BREAST TENDERNESS: ICD-10-CM

## 2024-01-02 DIAGNOSIS — N63.0 MASS OF BREAST, UNSPECIFIED LATERALITY: Primary | ICD-10-CM

## 2024-01-02 PROCEDURE — 99213 OFFICE O/P EST LOW 20 MIN: CPT | Performed by: NURSE PRACTITIONER

## 2024-01-02 PROCEDURE — G8484 FLU IMMUNIZE NO ADMIN: HCPCS | Performed by: NURSE PRACTITIONER

## 2024-01-02 PROCEDURE — G8427 DOCREV CUR MEDS BY ELIG CLIN: HCPCS | Performed by: NURSE PRACTITIONER

## 2024-01-02 PROCEDURE — 4004F PT TOBACCO SCREEN RCVD TLK: CPT | Performed by: NURSE PRACTITIONER

## 2024-01-02 PROCEDURE — G8419 CALC BMI OUT NRM PARAM NOF/U: HCPCS | Performed by: NURSE PRACTITIONER

## 2024-01-02 NOTE — PROGRESS NOTES
component had both face to face and non face to face time spent in determining the total time component.  Counseling and education regarding her diagnosis listed below and her options regarding those diagnoses were also included in determining her time component.      Diagnosis Orders   1. Mass of breast, unspecified laterality        2. Breast tenderness  [facility] W OR WO CAD BILATERAL    US BREAST LIMITED RIGHT           The patient had her preventative health maintenance recommendations and follow-up reviewed with her at the completion of her visit.

## 2024-01-19 ENCOUNTER — HOSPITAL ENCOUNTER (OUTPATIENT)
Dept: WOMENS IMAGING | Age: 34
End: 2024-01-19
Payer: COMMERCIAL

## 2024-01-19 DIAGNOSIS — N64.4 BREAST TENDERNESS: ICD-10-CM

## 2024-01-19 PROCEDURE — 76642 ULTRASOUND BREAST LIMITED: CPT

## 2024-01-19 PROCEDURE — G0279 TOMOSYNTHESIS, MAMMO: HCPCS

## 2024-07-24 NOTE — LETTER
[facility]  [address]  [address] [facility] [address]  Phone: [phone]  Fax: [phone] [facility], APRN - CNP        May 10, 2019     Patient: Viyk Cortez   YOB: 1990   Date of Visit: 5/10/2019       To Whom it May Concern:    Jennifer Holguin was seen in my clinic on 5/10/2019. She needs to be off work until her next appointment on 05/22/19 for reevaluation  due to pregnancy issues. If you have any questions or concerns, please don't hesitate to call.     Sincerely,         HALEY Cintron CNP
24-Jul-2024 12:05

## (undated) DEVICE — GLOVE ORANGE PI 8   MSG9080

## (undated) DEVICE — 3000ML,PRESSURE INFUSER W/STOPCOCK: Brand: MEDLINE

## (undated) DEVICE — SOLUTION IRRIG 1000ML 0.9% SOD CHL USP POUR PLAS BTL

## (undated) DEVICE — PAD PT POS 36 IN SURGYPAD DISP

## (undated) DEVICE — MERCY HEALTH ST CHARLES: Brand: MEDLINE INDUSTRIES, INC.

## (undated) DEVICE — TUBING, SUCTION, 3/16" X 10', STRAIGHT: Brand: MEDLINE

## (undated) DEVICE — PAD,NON-ADHERENT,3X8,STERILE,LF,1/PK: Brand: MEDLINE

## (undated) DEVICE — KIT THERMOABLATION 6MM ENDOMET DEV NOVASURE

## (undated) DEVICE — UNDERPANTS MAT L XL SEAMLESS CLR CODE WAISTBAND KNIT

## (undated) DEVICE — SEALER VESSEL LIGASURE MARYLAND 37CM

## (undated) DEVICE — SOLUTION IRRIG 3000ML 0.9% SOD CHL USP UROMATIC PLAS CONT

## (undated) DEVICE — SINGLE PORT MANIFOLD: Brand: NEPTUNE 2

## (undated) DEVICE — TROCARS: Brand: KII® BLUNT TIP ACCESS SYSTEM

## (undated) DEVICE — TROCAR: Brand: KII FIOS FIRST ENTRY

## (undated) DEVICE — TOTAL TRAY, DB, 100% SILI FOLEY, 16FR 10: Brand: MEDLINE

## (undated) DEVICE — SOLUTION ANTIFOG VIS SYS CLEARIFY LAPSCP

## (undated) DEVICE — PAD,SANITARY,11 IN,MAXI,W/WINGS,N-STRL: Brand: MEDLINE

## (undated) DEVICE — GEL US 20GM NONIRRITATING OVERWRAPPED FILE PCH TRNSMIT

## (undated) DEVICE — MARKER,SKIN,WI/RULER AND LABELS: Brand: MEDLINE

## (undated) DEVICE — SUTURE MCRYL + SZ 4-0 L27IN ABSRB UD L19MM PS-2 3/8 CIR MCP426H

## (undated) DEVICE — GLOVE ORANGE PI 7   MSG9070

## (undated) DEVICE — BLANKET WRM W29.9XL79.1IN UP BODY FORC AIR MISTRAL-AIR

## (undated) DEVICE — GOWN,SIRUS,NONRNF,SETINSLV,XL,20/CS: Brand: MEDLINE

## (undated) DEVICE — SET,IRRIGATION,CYSTO/TUR,90": Brand: MEDLINE

## (undated) DEVICE — SUTURE VCRL + SZ 0 L27IN ABSRB VLT L26MM UR-6 5/8 CIR VCP603H

## (undated) DEVICE — 3M™ STERI-STRIP™ COMPOUND BENZOIN TINCTURE 40 BAGS/CARTON 4 CARTONS/CASE C1544: Brand: 3M™ STERI-STRIP™

## (undated) DEVICE — ST CHARLES GYN LAPAROSCOPY PK: Brand: MEDLINE INDUSTRIES, INC.

## (undated) DEVICE — DRESSING TRNSPAR W2XL2.75IN FLM SHT SEMIPERMEABLE WIND

## (undated) DEVICE — SUTURE VCRL + SZ 4-0 L27IN ABSRB WHT FS-2 3/8 CIR REV CUT VCP422H

## (undated) DEVICE — ST CHARLES PERI-GYN PACK: Brand: MEDLINE INDUSTRIES, INC.

## (undated) DEVICE — Device

## (undated) DEVICE — STRIP,CLOSURE,WOUND,MEDI-STRIP,1/2X4: Brand: MEDLINE